# Patient Record
Sex: FEMALE | Race: BLACK OR AFRICAN AMERICAN | NOT HISPANIC OR LATINO | Employment: OTHER | URBAN - METROPOLITAN AREA
[De-identification: names, ages, dates, MRNs, and addresses within clinical notes are randomized per-mention and may not be internally consistent; named-entity substitution may affect disease eponyms.]

---

## 2023-05-04 ENCOUNTER — APPOINTMENT (OUTPATIENT)
Dept: PRIMARY CARE | Facility: CLINIC | Age: 78
End: 2023-05-04
Payer: MEDICARE

## 2023-06-18 DIAGNOSIS — E78.2 MIXED HYPERLIPIDEMIA: ICD-10-CM

## 2023-06-23 DIAGNOSIS — E78.5 HYPERLIPIDEMIA, UNSPECIFIED HYPERLIPIDEMIA TYPE: ICD-10-CM

## 2023-06-23 DIAGNOSIS — E78.5 HYPERLIPIDEMIA, UNSPECIFIED HYPERLIPIDEMIA TYPE: Primary | ICD-10-CM

## 2023-06-23 RX ORDER — ATORVASTATIN CALCIUM 10 MG/1
10 TABLET, FILM COATED ORAL DAILY
Qty: 90 TABLET | Refills: 3 | Status: SHIPPED | OUTPATIENT
Start: 2023-06-23 | End: 2023-06-23 | Stop reason: SDUPTHER

## 2023-06-23 RX ORDER — ATORVASTATIN CALCIUM 10 MG/1
10 TABLET, FILM COATED ORAL DAILY
Qty: 90 TABLET | Refills: 3 | Status: SHIPPED | OUTPATIENT
Start: 2023-06-23 | End: 2024-05-20 | Stop reason: SDUPTHER

## 2023-06-23 RX ORDER — ATORVASTATIN CALCIUM 10 MG/1
1 TABLET, FILM COATED ORAL DAILY
COMMUNITY
Start: 2019-08-16 | End: 2023-06-23 | Stop reason: SDUPTHER

## 2023-07-01 RX ORDER — ATORVASTATIN CALCIUM 10 MG/1
TABLET, FILM COATED ORAL
Qty: 90 TABLET | Refills: 3 | Status: SHIPPED | OUTPATIENT
Start: 2023-07-01 | End: 2024-06-30

## 2023-09-20 PROBLEM — H81.10 VERTIGO, BENIGN POSITIONAL: Status: ACTIVE | Noted: 2023-09-20

## 2023-09-20 PROBLEM — M76.61 ACHILLES TENDINITIS OF RIGHT LOWER EXTREMITY: Status: ACTIVE | Noted: 2023-09-20

## 2023-09-20 PROBLEM — N76.1 CHRONIC VAGINITIS: Status: ACTIVE | Noted: 2023-09-20

## 2023-09-20 PROBLEM — S83.241A TEAR OF MEDIAL MENISCUS OF RIGHT KNEE: Status: ACTIVE | Noted: 2023-09-20

## 2023-09-20 PROBLEM — M19.90 ARTHRITIS: Status: ACTIVE | Noted: 2023-09-20

## 2023-09-20 PROBLEM — L30.9 DERMATITIS: Status: ACTIVE | Noted: 2023-09-20

## 2023-09-20 PROBLEM — G47.00 INSOMNIA: Status: ACTIVE | Noted: 2023-09-20

## 2023-09-20 PROBLEM — E83.41 HIGH MAGNESIUM LEVELS: Status: ACTIVE | Noted: 2023-09-20

## 2023-09-20 PROBLEM — L30.9 ECZEMA: Status: ACTIVE | Noted: 2023-09-20

## 2023-09-20 PROBLEM — E78.2 MIXED HYPERLIPIDEMIA: Status: ACTIVE | Noted: 2023-09-20

## 2023-09-20 PROBLEM — M19.019 SHOULDER ARTHRITIS: Status: ACTIVE | Noted: 2023-09-20

## 2023-09-20 PROBLEM — M81.0 OSTEOPOROSIS: Status: ACTIVE | Noted: 2023-09-20

## 2023-09-20 PROBLEM — K21.9 GERD WITHOUT ESOPHAGITIS: Status: ACTIVE | Noted: 2023-09-20

## 2023-09-20 PROBLEM — H60.8X1 CHRONIC ECZEMATOUS OTITIS EXTERNA OF RIGHT EAR: Status: ACTIVE | Noted: 2023-09-20

## 2023-09-20 RX ORDER — IBANDRONATE SODIUM 3 MG/3 ML
SYRINGE (ML) INTRAVENOUS
COMMUNITY
Start: 2020-02-06 | End: 2024-05-20 | Stop reason: ALTCHOICE

## 2023-09-20 RX ORDER — IBANDRONATE SODIUM 3 MG/3ML
INJECTION, SOLUTION INTRAVENOUS
COMMUNITY
Start: 2020-02-06 | End: 2024-05-20 | Stop reason: ALTCHOICE

## 2023-09-20 RX ORDER — IBUPROFEN 800 MG/1
1 TABLET ORAL 3 TIMES DAILY
COMMUNITY
Start: 2022-05-02 | End: 2024-05-21 | Stop reason: SDUPTHER

## 2023-09-20 RX ORDER — NAPROXEN SODIUM 275 MG/1
1 TABLET ORAL EVERY 12 HOURS PRN
COMMUNITY
Start: 2021-12-09 | End: 2024-02-23 | Stop reason: ALTCHOICE

## 2023-09-20 RX ORDER — PANTOPRAZOLE SODIUM 40 MG/1
1 TABLET, DELAYED RELEASE ORAL DAILY
COMMUNITY
Start: 2018-08-07 | End: 2024-02-23 | Stop reason: ALTCHOICE

## 2023-09-20 RX ORDER — DEXTROMETHORPHAN HYDROBROMIDE, GUAIFENESIN 5; 100 MG/5ML; MG/5ML
LIQUID ORAL EVERY 8 HOURS PRN
COMMUNITY

## 2023-09-20 RX ORDER — ASPIRIN 81 MG/1
TABLET ORAL
COMMUNITY
End: 2024-02-23 | Stop reason: ALTCHOICE

## 2023-09-20 RX ORDER — ZOSTER VACCINE RECOMBINANT, ADJUVANTED 50 MCG/0.5
KIT INTRAMUSCULAR
COMMUNITY
Start: 2018-06-21 | End: 2024-05-20 | Stop reason: ALTCHOICE

## 2023-09-20 RX ORDER — TRAZODONE HYDROCHLORIDE 50 MG/1
1 TABLET ORAL NIGHTLY
COMMUNITY
Start: 2021-10-25 | End: 2024-02-23 | Stop reason: ALTCHOICE

## 2023-09-20 RX ORDER — FLUOCINONIDE 1 MG/G
1 CREAM TOPICAL 2 TIMES DAILY
COMMUNITY
Start: 2021-04-09 | End: 2024-02-23 | Stop reason: ALTCHOICE

## 2023-09-20 RX ORDER — MINERAL OIL
180 ENEMA (ML) RECTAL DAILY PRN
COMMUNITY

## 2023-09-20 RX ORDER — CLOTRIMAZOLE AND BETAMETHASONE DIPROPIONATE 10; .64 MG/G; MG/G
1 CREAM TOPICAL 2 TIMES DAILY
COMMUNITY
Start: 2018-06-21 | End: 2024-02-23 | Stop reason: ALTCHOICE

## 2023-09-20 RX ORDER — CLOBETASOL PROPIONATE 0.5 MG/G
CREAM TOPICAL 2 TIMES DAILY
COMMUNITY
Start: 2020-07-02 | End: 2024-02-23 | Stop reason: ALTCHOICE

## 2023-09-20 RX ORDER — CLINDAMYCIN HYDROCHLORIDE 300 MG/1
CAPSULE ORAL
COMMUNITY
End: 2024-02-23 | Stop reason: ALTCHOICE

## 2023-09-20 RX ORDER — HYDROCODONE BITARTRATE AND ACETAMINOPHEN 5; 325 MG/1; MG/1
TABLET ORAL
COMMUNITY
Start: 2017-05-19 | End: 2024-02-23 | Stop reason: ALTCHOICE

## 2023-09-20 RX ORDER — SULFAMETHOXAZOLE AND TRIMETHOPRIM 800; 160 MG/1; MG/1
TABLET ORAL
COMMUNITY
Start: 2017-05-19 | End: 2024-02-23 | Stop reason: ALTCHOICE

## 2023-09-20 RX ORDER — MULTIVITAMIN
1 TABLET ORAL DAILY
COMMUNITY
End: 2024-05-20 | Stop reason: WASHOUT

## 2023-09-20 RX ORDER — FLUOCINOLONE ACETONIDE 0.25 MG/G
CREAM TOPICAL
COMMUNITY
Start: 2021-04-09 | End: 2024-05-20 | Stop reason: WASHOUT

## 2023-09-20 RX ORDER — METHYLPREDNISOLONE ACETATE 40 MG/ML
INJECTION, SUSPENSION INTRA-ARTICULAR; INTRALESIONAL; INTRAMUSCULAR; SOFT TISSUE
COMMUNITY
Start: 2016-07-08 | End: 2024-02-23 | Stop reason: ALTCHOICE

## 2023-12-15 ENCOUNTER — OFFICE VISIT (OUTPATIENT)
Dept: ORTHOPEDIC SURGERY | Facility: CLINIC | Age: 78
End: 2023-12-15
Payer: MEDICARE

## 2023-12-15 DIAGNOSIS — M16.12 ARTHRITIS OF LEFT HIP: Primary | ICD-10-CM

## 2023-12-15 PROCEDURE — 20611 DRAIN/INJ JOINT/BURSA W/US: CPT | Performed by: EMERGENCY MEDICINE

## 2023-12-15 PROCEDURE — 99213 OFFICE O/P EST LOW 20 MIN: CPT | Performed by: EMERGENCY MEDICINE

## 2023-12-15 RX ORDER — LIDOCAINE HYDROCHLORIDE 10 MG/ML
4 INJECTION INFILTRATION; PERINEURAL
Status: COMPLETED | OUTPATIENT
Start: 2023-12-15 | End: 2023-12-15

## 2023-12-15 RX ORDER — TRIAMCINOLONE ACETONIDE 40 MG/ML
80 INJECTION, SUSPENSION INTRA-ARTICULAR; INTRAMUSCULAR
Status: COMPLETED | OUTPATIENT
Start: 2023-12-15 | End: 2023-12-15

## 2023-12-15 RX ADMIN — LIDOCAINE HYDROCHLORIDE 4 ML: 10 INJECTION INFILTRATION; PERINEURAL at 12:03

## 2023-12-15 RX ADMIN — TRIAMCINOLONE ACETONIDE 80 MG: 40 INJECTION, SUSPENSION INTRA-ARTICULAR; INTRAMUSCULAR at 12:03

## 2023-12-15 NOTE — PROGRESS NOTES
Subjective   Sharifa Moore is a 78 y.o. female who presents for Follow-up and Pain of the Left Hip (DOLI: 9/18/23/)    HPI  12/15/23: Patient returns today for left hip pain.  We did perform a left hip intra-articular injection for her on 9/18/2023.  She felt this worked quite well for her and would like to have a repeat injection performed today.  No additional complaints.    9/18/23: Patient returns today for left hip pain. We performed a left hip intra-articular injection on 6/30/2023. She felt this worked well up until recently. She would like to have a repeat hip intra-articular injection today. She denies any further injuries. She has no additional complaints at this time.     6/30/23: Patient returns today for left hip pain. She states the previous injection she received on 3/31/2023 worked quite well for her up until recently. She presents today requesting repeat injection. She denies further injuries. She has no additional complaints today.    3/31/23: Patient returns today for left hip pain. She states the previous injection she received on 12/19/2022 worked quite well for her. She presents today requesting repeat injection versus further treatment options. She denies any further injuries. She has no additional complaints at this time.    12/19/22: Patient returns today for reevaluation for left hip pain. She states the preinjection she received on 9/8/2022 worked quite well for her up until recently. She presents today requesting repeat injection. She denies any further injuries. She has no additional complaints at this time.    9/8/22: Patient returns today for reevaluation for left hip and right ankle pain. In regards to her hip pain, she feels that physical therapy has not helped significantly. She continues to have pain that she localizes deep in her groin with radiation to the lateral aspect of her hip. This pain is worse with hip flexion and has progressed to pain with weightbearing activities.  Regards to her ankle pain, she no longer localizes her pain along the Achilles tendon, however she does localize it to the deep and anterior aspect of her ankle. She states that this is associated with swelling that becomes progressively worse throughout weightbearing activities. She would like to discuss further treatment options.    5/9/22: Sharifa is a very pleasant 77-year-old female who presented today 5/9/2022 for evaluation of left hip pain. She is known to me from previous visits for right knee arthritis. Today, she states that she has had about 2.5 months of left hip pain that started after a session on a stationary bike. She states that she was riding for approximately 10 minutes before feeling anterior left hip pain. She stopped riding and felt a constant soreness/achy type sensation rated 6/10 intensity. She was seen by her primary care doctor who obtained a radiograph of the left hip and recommended Tylenol for pain control. She states that the pain is not responding to rest alone but does temporarily improve with Tylenol. Pain is worse with movement and exercise, better with Tylenol and rest. At the worst she rates the pain 9/10 intensity anterior left hip. She has never had issues with her hip prior to this. She denies any paresthesias or back pain. She denies any skin changes, erythema, redness, bruising or swelling. She denies any other complaints today. Subsequently, she is complaining of right posterior ankle pain. I did evaluate this in the past when she had generalized ankle pain and stiffness consistent with degenerative arthritis. However, she states that she has developed worsening pain on the posterior aspect that she localizes along the mid substance of the Achilles tendon. She states that this has become progressively worse of the past 2 months as well. She denies acute trauma.       ROS: All pertinent positive symptoms are included in the history of present illness.    All other systems  have been reviewed and are negative and noncontributory to this patient's current ailments.    Objective     There were no vitals filed for this visit.    Physical Exam  General/Constitutional: No apparent distress. Well-nourished and well developed.  Head: Normocephalic, Atraumatic.   Eyes: EOMI.  Vascular: No edema, swelling or tenderness, except as noted in detailed exam.  Respiratory: Non-labored breathing.  Integumentary: No impressive skin lesions present, except as noted in detailed exam.  Neurological: Oriented to person, place, and time.  Psychological: Normal mood and affect.  Musculoskeletal: Normal, except as noted in detailed exam   The left hip and pelvis are without obvious signs of acute bony deformity, swelling, erythema, ecchymosis or instability. There is tenderness to palpation over the rectus femoris and psoas over the left anterior hip region. Active straight leg raise versus gravity reproduces her anterior hip pain, although she is able to hold the leg in extension with assistance. Seated hip flexion versus resistance also produces pain. Active and passive range of motion are full and pain-free. Logroll is negative.. Straight leg raise test is negative for pain or radicular symptoms. Thaddeus is negative. Crossover is negative. Hip strength is weak as compared to the opposite hip. The opposite hip is otherwise nontender and stable. Gait is antalgic and tandem.       Patient ID: Sharifa Moore is a 78 y.o. female.    L Inj/Asp: L hip joint on 12/15/2023 12:03 PM  Indications: pain  Details: 20 G needle, ultrasound-guided anterior approach  Medications: 80 mg triamcinolone acetonide 40 mg/mL; 4 mL lidocaine 10 mg/mL (1 %)  Procedure, treatment alternatives, risks and benefits explained, specific risks discussed. Consent was given by the patient. Immediately prior to procedure a time out was called to verify the correct patient, procedure, equipment, support staff and site/side marked as  required. Patient was prepped and draped in the usual sterile fashion.           Assessment/Plan   Problem List Items Addressed This Visit    None  Visit Diagnoses       Arthritis of left hip        Relevant Orders    Point of Care Ultrasound (Completed)          Considering that she has done well with previous injections, I feel that a repeat injection is warranted today. Discussed risks versus benefits of having injection performed. Patient has elected to proceed with procedure. Please refer to procedure note above. Discussed with patient to limit weightbearing activities over the next 24-48 hours, they can then progress to full activities as tolerated. Discussed with patient to avoid water submersion over the next 2 days. Discussed with patient to call me immediately if they develop worsening pain, rash, erythema, or fevers. Patient should follow-up as needed if pain persists or worsens.    Param Guzman, DO  Sports Medicine  [facility]     ** Please excuse any errors in grammar or translation related to this dictation. Voice recognition software was utilized to prepare this document. **

## 2023-12-19 ENCOUNTER — APPOINTMENT (OUTPATIENT)
Dept: ORTHOPEDIC SURGERY | Facility: HOSPITAL | Age: 78
End: 2023-12-19
Payer: MEDICARE

## 2023-12-22 ENCOUNTER — OFFICE VISIT (OUTPATIENT)
Dept: ORTHOPEDIC SURGERY | Facility: CLINIC | Age: 78
End: 2023-12-22
Payer: MEDICARE

## 2023-12-22 DIAGNOSIS — M17.11 ARTHRITIS OF RIGHT KNEE: Primary | ICD-10-CM

## 2023-12-22 PROCEDURE — 99213 OFFICE O/P EST LOW 20 MIN: CPT | Performed by: EMERGENCY MEDICINE

## 2023-12-22 PROCEDURE — 20611 DRAIN/INJ JOINT/BURSA W/US: CPT | Performed by: EMERGENCY MEDICINE

## 2023-12-22 RX ORDER — TRIAMCINOLONE ACETONIDE 40 MG/ML
80 INJECTION, SUSPENSION INTRA-ARTICULAR; INTRAMUSCULAR
Status: COMPLETED | OUTPATIENT
Start: 2023-12-22 | End: 2023-12-22

## 2023-12-22 RX ORDER — LIDOCAINE HYDROCHLORIDE 10 MG/ML
4 INJECTION INFILTRATION; PERINEURAL
Status: COMPLETED | OUTPATIENT
Start: 2023-12-22 | End: 2023-12-22

## 2023-12-22 RX ADMIN — LIDOCAINE HYDROCHLORIDE 4 ML: 10 INJECTION INFILTRATION; PERINEURAL at 10:31

## 2023-12-22 RX ADMIN — TRIAMCINOLONE ACETONIDE 80 MG: 40 INJECTION, SUSPENSION INTRA-ARTICULAR; INTRAMUSCULAR at 10:31

## 2023-12-22 ASSESSMENT — ENCOUNTER SYMPTOMS: KNEE SWELLING: 1

## 2023-12-22 NOTE — PROGRESS NOTES
Subjective   Sharifa Moore is a 78 y.o. female who presents for Follow-up of the Right Knee    Right Knee       12/22/23: Patient returns today for right knee pain.  We performed a right knee corticosteroid injection on 7/28/2023.  She felt that this worked quite well for her and would like to have a repeat injection performed today.  No additional complaints.    7/28/23: Patient returns today for right knee pain. She states the previous injection she received on 5/5/2023 worked quite well up until recently. She presents today requesting a repeat injection. She denies any further injuries.    5/5/23: Patient returns today for right knee pain. She states the previous injection she received on 1/13/2023 worked quite well for her up until recently. She would like to have a repeat injection today. She denies any further injuries. She has no additional complaints at this time.    1/13/23: Patient returns today for reevaluation for right knee pain. She states that the previous injection she received on 3/21/2022 did provide significant relief up until recently. She presents today requesting repeat injection. She denies any recent injuries.    3/21/22: Sharifa is a very pleasant 77-year-old female here today for evaluation of chronic right knee pain. She states that approximately 6 months ago her knee pain insidiously began to worsen without any acute injury or trauma. Referral by Dr Fernando. She does have a history of partial tear of her right Achilles 4 years ago however, she has no other history of right lower extremity pain. She has no surgical history for this knee. She describes an achy pain about her right anterior and lateral aspect of her knee that is worse with standing for too long, kneeling, bending, stairs, standing from a seated position and she does feel unstable at times. Pain rated as moderate in intensity, not associated with any swelling or erythema. She has tried naproxen 1 tab about 2-3 times a  week, Tylenol arthritis and Vinny green herbal cream for pain relief. She denies any recent injury. She would like to discuss further treatment options today. No other complaints today.       ROS: All pertinent positive symptoms are included in the history of present illness.    All other systems have been reviewed and are negative and noncontributory to this patient's current ailments.    Objective     There were no vitals filed for this visit.    Physical Exam  ysical Exam  General/Constitutional: No apparent distress. Well-nourished and well developed.  Head: Normocephalic, Atraumatic.   Eyes: EOMI.  Vascular: No edema, swelling or tenderness, except as noted in detailed exam.  Respiratory: Non-labored breathing.  Integumentary: No impressive skin lesions present, except as noted in detailed exam.  Neurological: Oriented to person, place, and time.  Psychological: Normal mood and affect.  Musculoskeletal: Normal, except as noted in detailed exam     The right knee is without obvious signs of acute bony deformity, swelling, erythema, ecchymosis or joint effusion. The patella is without tenderness. Apprehension is negative with medial and lateral glide. Patella crepitus is negative. Patella grind is positive. The medial joint line is nontender and without bony crepitus or step-off. The lateral joint line is TTP and without bony crepitus or step-off. Flexion & extension are full and symmetrical. Varus & valgus stress test at 0° and 30° of flexion, Lachman's, Bj's, Apley's, dial test at 90° and 30° of flexion, and posterior drawer are all negative. The opposite knee is nontender and stable. Gait is pain-free and tandem.    Patient ID: Sharifa Moore is a 78 y.o. female.    L Inj/Asp: R knee on 12/22/2023 10:31 AM  Indications: pain  Details: 25 G needle, ultrasound-guided superolateral approach  Medications: 80 mg triamcinolone acetonide 40 mg/mL; 4 mL lidocaine 10 mg/mL (1 %)  Outcome: tolerated well, no  immediate complications  Procedure, treatment alternatives, risks and benefits explained, specific risks discussed. Consent was given by the patient. Immediately prior to procedure a time out was called to verify the correct patient, procedure, equipment, support staff and site/side marked as required. Patient was prepped and draped in the usual sterile fashion.             Assessment/Plan   Problem List Items Addressed This Visit    None  Visit Diagnoses       Arthritis of right knee        Relevant Orders    Point of Care Ultrasound (Completed)          Considering that she did well with previous injections, I feel that a repeat injection today is warranted. Discussed risks versus benefits of having injection performed. Patient has elected to proceed with procedure. Please refer to procedure note above. Discussed with patient to limit weightbearing activities over the next 24-48 hours, they can then progress to full activities as tolerated. Discussed with patient to avoid water submersion over the next 2 days. Discussed with patient to call me immediately if they develop worsening pain, rash, erythema, or fevers. Patient should follow-up as needed if pain persists or worsens.    Param Guzman,   Sports Medicine  [facility]     ** Please excuse any errors in grammar or translation related to this dictation. Voice recognition software was utilized to prepare this document. **

## 2024-02-23 ENCOUNTER — OFFICE VISIT (OUTPATIENT)
Dept: PRIMARY CARE | Facility: CLINIC | Age: 79
End: 2024-02-23
Payer: MEDICARE

## 2024-02-23 VITALS
WEIGHT: 169 LBS | BODY MASS INDEX: 30.94 KG/M2 | HEART RATE: 50 BPM | DIASTOLIC BLOOD PRESSURE: 69 MMHG | SYSTOLIC BLOOD PRESSURE: 153 MMHG | OXYGEN SATURATION: 98 % | TEMPERATURE: 96.8 F

## 2024-02-23 DIAGNOSIS — Z12.31 SCREENING MAMMOGRAM FOR BREAST CANCER: ICD-10-CM

## 2024-02-23 DIAGNOSIS — F51.02 ADJUSTMENT INSOMNIA: ICD-10-CM

## 2024-02-23 DIAGNOSIS — Z00.00 ROUTINE GENERAL MEDICAL EXAMINATION AT HEALTH CARE FACILITY: ICD-10-CM

## 2024-02-23 DIAGNOSIS — E78.2 MIXED HYPERLIPIDEMIA: ICD-10-CM

## 2024-02-23 DIAGNOSIS — I10 HYPERTENSION, UNSPECIFIED TYPE: ICD-10-CM

## 2024-02-23 DIAGNOSIS — R53.83 FATIGUE, UNSPECIFIED TYPE: ICD-10-CM

## 2024-02-23 DIAGNOSIS — D64.9 ANEMIA, UNSPECIFIED TYPE: Primary | ICD-10-CM

## 2024-02-23 PROCEDURE — 80053 COMPREHEN METABOLIC PANEL: CPT | Performed by: INTERNAL MEDICINE

## 2024-02-23 PROCEDURE — 1159F MED LIST DOCD IN RCRD: CPT | Performed by: INTERNAL MEDICINE

## 2024-02-23 PROCEDURE — 3077F SYST BP >= 140 MM HG: CPT | Performed by: INTERNAL MEDICINE

## 2024-02-23 PROCEDURE — 1160F RVW MEDS BY RX/DR IN RCRD: CPT | Performed by: INTERNAL MEDICINE

## 2024-02-23 PROCEDURE — G0439 PPPS, SUBSEQ VISIT: HCPCS | Performed by: INTERNAL MEDICINE

## 2024-02-23 PROCEDURE — 1170F FXNL STATUS ASSESSED: CPT | Performed by: INTERNAL MEDICINE

## 2024-02-23 PROCEDURE — 1036F TOBACCO NON-USER: CPT | Performed by: INTERNAL MEDICINE

## 2024-02-23 PROCEDURE — 84443 ASSAY THYROID STIM HORMONE: CPT | Performed by: INTERNAL MEDICINE

## 2024-02-23 PROCEDURE — 85025 COMPLETE CBC W/AUTO DIFF WBC: CPT | Performed by: INTERNAL MEDICINE

## 2024-02-23 PROCEDURE — 3078F DIAST BP <80 MM HG: CPT | Performed by: INTERNAL MEDICINE

## 2024-02-23 PROCEDURE — 99214 OFFICE O/P EST MOD 30 MIN: CPT | Performed by: INTERNAL MEDICINE

## 2024-02-23 PROCEDURE — 80061 LIPID PANEL: CPT | Performed by: INTERNAL MEDICINE

## 2024-02-23 RX ORDER — TRAZODONE HYDROCHLORIDE 50 MG/1
50 TABLET ORAL NIGHTLY PRN
Qty: 90 TABLET | Refills: 3 | Status: SHIPPED | OUTPATIENT
Start: 2024-02-23 | End: 2024-06-03 | Stop reason: HOSPADM

## 2024-02-23 RX ORDER — TRAZODONE HYDROCHLORIDE 50 MG/1
50 TABLET ORAL NIGHTLY
COMMUNITY
End: 2024-05-20 | Stop reason: SDUPTHER

## 2024-02-23 ASSESSMENT — ENCOUNTER SYMPTOMS
LOSS OF SENSATION IN FEET: 0
DEPRESSION: 0
OCCASIONAL FEELINGS OF UNSTEADINESS: 0

## 2024-02-23 ASSESSMENT — ACTIVITIES OF DAILY LIVING (ADL)
GROOMING: INDEPENDENT
DOING HOUSEWORK: INDEPENDENT
NEEDS ASSISTANCE WITH FOOD: INDEPENDENT
ASSISTIVE_DEVICE: HEARING AID - LEFT;HEARING AID - RIGHT
PATIENT'S MEMORY ADEQUATE TO SAFELY COMPLETE DAILY ACTIVITIES?: YES
TOILETING: INDEPENDENT
USING TRANSPORTATION: INDEPENDENT
USING TELEPHONE: INDEPENDENT
MANAGING FINANCES: INDEPENDENT
FEEDING YOURSELF: INDEPENDENT
HEARING - LEFT EAR: HEARING AID
PILL BOX USED: NO
PREPARING MEALS: INDEPENDENT
GROCERY SHOPPING: INDEPENDENT
EATING: INDEPENDENT
JUDGMENT_ADEQUATE_SAFELY_COMPLETE_DAILY_ACTIVITIES: YES
TAKING MEDICATION: INDEPENDENT
BATHING: INDEPENDENT
WALKS IN HOME: INDEPENDENT
DRESSING YOURSELF: INDEPENDENT
STIL DRIVING: YES
ADEQUATE_TO_COMPLETE_ADL: YES
HEARING - RIGHT EAR: HEARING AID

## 2024-02-23 ASSESSMENT — COGNITIVE AND FUNCTIONAL STATUS - GENERAL
VERBAL FLUENCY - ANIMAL NAMES (0 TO 25): 25
TRAIL MAKING TEST: PATIENT COMPLETES TRAIL MAKING TEST PROPERLY.

## 2024-02-23 ASSESSMENT — GERIATRIC MINI NUTRITIONAL ASSESSMENT (MNA)
A HAS FOOD INTAKE DECLINED OVER THE PAST 3 MONTHS DUE TO LOSS OF APPETITE, DIGESTIVE PROBLEMS, CHEWING OR SWALLOWING DIFFICULTIES?: NO DECREASE IN FOOD INTAKE
B WEIGHT LOSS DURING THE LAST 3 MONTHS: NO WEIGHT LOSS
D HAS SUFFERED PSYCHOLOGICAL STRESS OR ACUTE DISEASE IN THE PAST 3 MONTHS?: NO
C GENERAL MOBILITY: GOES OUT
E NEUROPSYCHOLOGICAL PROBLEMS: NO PSYCHOLOGICAL PROBLEMS

## 2024-02-23 ASSESSMENT — COLUMBIA-SUICIDE SEVERITY RATING SCALE - C-SSRS
2. HAVE YOU ACTUALLY HAD ANY THOUGHTS OF KILLING YOURSELF?: NO
6. HAVE YOU EVER DONE ANYTHING, STARTED TO DO ANYTHING, OR PREPARED TO DO ANYTHING TO END YOUR LIFE?: NO
1. IN THE PAST MONTH, HAVE YOU WISHED YOU WERE DEAD OR WISHED YOU COULD GO TO SLEEP AND NOT WAKE UP?: NO

## 2024-02-23 ASSESSMENT — PATIENT HEALTH QUESTIONNAIRE - PHQ9
1. LITTLE INTEREST OR PLEASURE IN DOING THINGS: NOT AT ALL
SUM OF ALL RESPONSES TO PHQ9 QUESTIONS 1 AND 2: 0
2. FEELING DOWN, DEPRESSED OR HOPELESS: NOT AT ALL

## 2024-02-23 NOTE — PROGRESS NOTES
Subjective   Reason for Visit: Sharifa Moore is an 78 y.o. female here for a Medicare Wellness visit.     Past Medical, Surgical, and Family History reviewed and updated in chart.    Reviewed all medications by prescribing practitioner or clinical pharmacist (such as prescriptions, OTCs, herbal therapies and supplements) and documented in the medical record.    HPI  78 years old female comes in to see me today for her subsequent Medicare wellness exam and follow-up visit.  We treated her for hyperlipidemia seasonal allergies and insomnia, she had bad osteoarthritis of the left hip and her right knee from favoring it.  Receives injection in her left hip every 3 months by Dr. Guzman.  Also injecting her right  Lives with daughter  . Lost her  he was 76 years old.  Sepsis in 2020.  He had a bad wound and he was on dialysis.  Medications are atorvastatin 10 mg a day Allegra 180 mg a day multivitamins and trazodone 50 mg a day for sleep or insomnia.  Advised her to take vitamin D 1000 mg a day [place] with calcium.  She will need mammogram  Patient Care Team:  Dov Jo MD as PCP - General (Internal Medicine)     Review of Systems  Alert oriented pleasant cooperative in no distress.  Nonicteric sclera or jaundice.  Face symmetrical cranial nerves intact.  Neck supple no masses no lymph node no thyromegaly or jugular venous distention.  Lungs clear no rales wheezing or crackles.  Heart normal S1 and S2 regular rhythm.  Abdomen benign nontender no masses no organomegaly.  Neurologically intact.  Objective   Vitals:  /69 (BP Location: Right arm, Patient Position: Sitting, BP Cuff Size: Adult)   Pulse 50   Temp 36 °C (96.8 °F) (Temporal)   Wt 76.7 kg (169 lb)   SpO2 98%   BMI 30.94 kg/m²       Physical Exam  Dictated above  Assessment/Plan   Problem List Items Addressed This Visit       Mixed hyperlipidemia    Relevant Orders    Lipid Panel     Other Visit Diagnoses       Anemia, unspecified type     -  Primary    Relevant Orders    CBC    Hypertension, unspecified type        Relevant Orders    Comprehensive Metabolic Panel    Fatigue, unspecified type        Relevant Orders    Thyroid Stimulating Hormone    Screening mammogram for breast cancer        Relevant Orders    BI mammo bilateral screening tomosynthesis    Routine general medical examination at health care facility

## 2024-02-26 ENCOUNTER — TELEPHONE (OUTPATIENT)
Dept: PRIMARY CARE | Facility: CLINIC | Age: 79
End: 2024-02-26
Payer: MEDICARE

## 2024-02-26 NOTE — TELEPHONE ENCOUNTER
----- Message from Dov Jo MD sent at 2/23/2024  5:39 PM EST -----  Regarding: r  Results are normal  Watch diet ,low carbs low fat

## 2024-03-08 ENCOUNTER — OFFICE VISIT (OUTPATIENT)
Dept: ORTHOPEDIC SURGERY | Facility: CLINIC | Age: 79
End: 2024-03-08
Payer: MEDICARE

## 2024-03-08 DIAGNOSIS — M16.12 ARTHRITIS OF LEFT HIP: Primary | ICD-10-CM

## 2024-03-08 PROCEDURE — 1160F RVW MEDS BY RX/DR IN RCRD: CPT | Performed by: EMERGENCY MEDICINE

## 2024-03-08 PROCEDURE — 1036F TOBACCO NON-USER: CPT | Performed by: EMERGENCY MEDICINE

## 2024-03-08 PROCEDURE — 1159F MED LIST DOCD IN RCRD: CPT | Performed by: EMERGENCY MEDICINE

## 2024-03-08 PROCEDURE — 99213 OFFICE O/P EST LOW 20 MIN: CPT | Performed by: EMERGENCY MEDICINE

## 2024-03-08 PROCEDURE — 20611 DRAIN/INJ JOINT/BURSA W/US: CPT | Performed by: EMERGENCY MEDICINE

## 2024-03-08 RX ORDER — TRIAMCINOLONE ACETONIDE 40 MG/ML
80 INJECTION, SUSPENSION INTRA-ARTICULAR; INTRAMUSCULAR
Status: COMPLETED | OUTPATIENT
Start: 2024-03-08 | End: 2024-03-08

## 2024-03-08 RX ORDER — LIDOCAINE HYDROCHLORIDE 10 MG/ML
4 INJECTION INFILTRATION; PERINEURAL
Status: COMPLETED | OUTPATIENT
Start: 2024-03-08 | End: 2024-03-08

## 2024-03-08 RX ADMIN — TRIAMCINOLONE ACETONIDE 80 MG: 40 INJECTION, SUSPENSION INTRA-ARTICULAR; INTRAMUSCULAR at 12:23

## 2024-03-08 RX ADMIN — LIDOCAINE HYDROCHLORIDE 4 ML: 10 INJECTION INFILTRATION; PERINEURAL at 12:23

## 2024-03-08 NOTE — PROGRESS NOTES
Subjective   Sharifa Moore is a 79 y.o. female who presents for Follow-up and Pain of the Left Hip (DOLI: 12/15/23/)    HPI    3/8/24: Patient returns today for left hip pain.  We did perform a left hip intra-articular injection for her on 12/15/2023.  She felt that it worked quite well up until 3 weeks ago.  She like to have a repeat injection performed today.  However additionally, she would like to discuss the option of total joint replacement.  She feels that these injections are beginning to wean in response and would like to discuss the neck step.    12/15/23: Patient returns today for left hip pain.  We did perform a left hip intra-articular injection for her on 9/18/2023.  She felt this worked quite well for her and would like to have a repeat injection performed today.  No additional complaints.    9/18/23: Patient returns today for left hip pain. We performed a left hip intra-articular injection on 6/30/2023. She felt this worked well up until recently. She would like to have a repeat hip intra-articular injection today. She denies any further injuries. She has no additional complaints at this time.     6/30/23: Patient returns today for left hip pain. She states the previous injection she received on 3/31/2023 worked quite well for her up until recently. She presents today requesting repeat injection. She denies further injuries. She has no additional complaints today.    3/31/23: Patient returns today for left hip pain. She states the previous injection she received on 12/19/2022 worked quite well for her. She presents today requesting repeat injection versus further treatment options. She denies any further injuries. She has no additional complaints at this time.    12/19/22: Patient returns today for reevaluation for left hip pain. She states the preinjection she received on 9/8/2022 worked quite well for her up until recently. She presents today requesting repeat injection. She denies any further  injuries. She has no additional complaints at this time.    9/8/22: Patient returns today for reevaluation for left hip and right ankle pain. In regards to her hip pain, she feels that physical therapy has not helped significantly. She continues to have pain that she localizes deep in her groin with radiation to the lateral aspect of her hip. This pain is worse with hip flexion and has progressed to pain with weightbearing activities. Regards to her ankle pain, she no longer localizes her pain along the Achilles tendon, however she does localize it to the deep and anterior aspect of her ankle. She states that this is associated with swelling that becomes progressively worse throughout weightbearing activities. She would like to discuss further treatment options.    5/9/22: Sharifa is a very pleasant 77-year-old female who presented today 5/9/2022 for evaluation of left hip pain. She is known to me from previous visits for right knee arthritis. Today, she states that she has had about 2.5 months of left hip pain that started after a session on a stationary bike. She states that she was riding for approximately 10 minutes before feeling anterior left hip pain. She stopped riding and felt a constant soreness/achy type sensation rated 6/10 intensity. She was seen by her primary care doctor who obtained a radiograph of the left hip and recommended Tylenol for pain control. She states that the pain is not responding to rest alone but does temporarily improve with Tylenol. Pain is worse with movement and exercise, better with Tylenol and rest. At the worst she rates the pain 9/10 intensity anterior left hip. She has never had issues with her hip prior to this. She denies any paresthesias or back pain. She denies any skin changes, erythema, redness, bruising or swelling. She denies any other complaints today. Subsequently, she is complaining of right posterior ankle pain. I did evaluate this in the past when she had  generalized ankle pain and stiffness consistent with degenerative arthritis. However, she states that she has developed worsening pain on the posterior aspect that she localizes along the mid substance of the Achilles tendon. She states that this has become progressively worse of the past 2 months as well. She denies acute trauma.       ROS: All pertinent positive symptoms are included in the history of present illness.    All other systems have been reviewed and are negative and noncontributory to this patient's current ailments.    Objective     There were no vitals filed for this visit.    Physical Exam  General/Constitutional: No apparent distress. Well-nourished and well developed.  Head: Normocephalic, Atraumatic.   Eyes: EOMI.  Vascular: No edema, swelling or tenderness, except as noted in detailed exam.  Respiratory: Non-labored breathing.  Integumentary: No impressive skin lesions present, except as noted in detailed exam.  Neurological: Oriented to person, place, and time.  Psychological: Normal mood and affect.  Musculoskeletal: Normal, except as noted in detailed exam   The left hip and pelvis are without obvious signs of acute bony deformity, swelling, erythema, ecchymosis or instability. There is tenderness to palpation over the rectus femoris and psoas over the left anterior hip region. Active straight leg raise versus gravity reproduces her anterior hip pain, although she is able to hold the leg in extension with assistance. Seated hip flexion versus resistance also produces pain. Active and passive range of motion are full and pain-free. Logroll is negative.. Straight leg raise test is negative for pain or radicular symptoms. Thaddeus is negative. Crossover is negative. Hip strength is weak as compared to the opposite hip. The opposite hip is otherwise nontender and stable. Gait is antalgic and tandem.       Patient ID: Sharifa Moore is a 79 y.o. female.    L Inj/Asp: L hip joint on 3/8/2024 12:23  PM  Indications: pain  Details: 20 G needle, ultrasound-guided anterior approach  Medications: 80 mg triamcinolone acetonide 40 mg/mL; 4 mL lidocaine 10 mg/mL (1 %)  Outcome: tolerated well, no immediate complications  Procedure, treatment alternatives, risks and benefits explained, specific risks discussed. Consent was given by the patient. Immediately prior to procedure a time out was called to verify the correct patient, procedure, equipment, support staff and site/side marked as required. Patient was prepped and draped in the usual sterile fashion.           Assessment/Plan   Problem List Items Addressed This Visit    None  Visit Diagnoses       Arthritis of left hip        Relevant Orders    Point of Care Ultrasound (Completed)          Considering that she has done well with previous injections, I feel that a repeat injection is warranted today. Discussed risks versus benefits of having injection performed. Patient has elected to proceed with procedure. Please refer to procedure note above. Discussed with patient to limit weightbearing activities over the next 24-48 hours, they can then progress to full activities as tolerated. Discussed with patient to avoid water submersion over the next 2 days. Discussed with patient to call me immediately if they develop worsening pain, rash, erythema, or fevers. Patient should follow-up as needed if pain persists or worsens.  Additionally, I am giving her referral for total joint replacement to discuss the option of total hip replacement.  She is aware that she cannot have a replacement within 3 months of an injection.  I am more than happy to continue injection therapy for her until she proceeds with total hip replacement.    Param Guzman, DO  Sports Medicine  [facility]     ** Please excuse any errors in grammar or translation related to this dictation. Voice recognition software was utilized to prepare this document. **

## 2024-03-13 ENCOUNTER — HOSPITAL ENCOUNTER (OUTPATIENT)
Dept: RADIOLOGY | Facility: CLINIC | Age: 79
Discharge: HOME | End: 2024-03-13
Payer: MEDICARE

## 2024-03-13 VITALS — WEIGHT: 166 LBS | BODY MASS INDEX: 30.55 KG/M2 | HEIGHT: 62 IN

## 2024-03-13 DIAGNOSIS — Z12.31 SCREENING MAMMOGRAM FOR BREAST CANCER: ICD-10-CM

## 2024-03-13 PROCEDURE — 77067 SCR MAMMO BI INCL CAD: CPT | Performed by: RADIOLOGY

## 2024-03-13 PROCEDURE — 77067 SCR MAMMO BI INCL CAD: CPT

## 2024-03-13 PROCEDURE — 77063 BREAST TOMOSYNTHESIS BI: CPT | Performed by: RADIOLOGY

## 2024-03-18 ENCOUNTER — TELEPHONE (OUTPATIENT)
Dept: PRIMARY CARE | Facility: CLINIC | Age: 79
End: 2024-03-18
Payer: MEDICARE

## 2024-03-18 NOTE — TELEPHONE ENCOUNTER
----- Message from Dov Jo MD sent at 3/16/2024  1:45 PM EDT -----  Regarding: R  Normal mammogram

## 2024-03-22 ENCOUNTER — OFFICE VISIT (OUTPATIENT)
Dept: ORTHOPEDIC SURGERY | Facility: CLINIC | Age: 79
End: 2024-03-22
Payer: MEDICARE

## 2024-03-22 DIAGNOSIS — M17.11 ARTHRITIS OF RIGHT KNEE: Primary | ICD-10-CM

## 2024-03-22 PROCEDURE — 1036F TOBACCO NON-USER: CPT | Performed by: EMERGENCY MEDICINE

## 2024-03-22 PROCEDURE — 20611 DRAIN/INJ JOINT/BURSA W/US: CPT | Performed by: EMERGENCY MEDICINE

## 2024-03-22 PROCEDURE — 99214 OFFICE O/P EST MOD 30 MIN: CPT | Performed by: EMERGENCY MEDICINE

## 2024-03-22 PROCEDURE — 1160F RVW MEDS BY RX/DR IN RCRD: CPT | Performed by: EMERGENCY MEDICINE

## 2024-03-22 PROCEDURE — 1159F MED LIST DOCD IN RCRD: CPT | Performed by: EMERGENCY MEDICINE

## 2024-03-22 RX ORDER — TRIAMCINOLONE ACETONIDE 40 MG/ML
80 INJECTION, SUSPENSION INTRA-ARTICULAR; INTRAMUSCULAR
Status: COMPLETED | OUTPATIENT
Start: 2024-03-22 | End: 2024-03-22

## 2024-03-22 RX ORDER — LIDOCAINE HYDROCHLORIDE 10 MG/ML
4 INJECTION INFILTRATION; PERINEURAL
Status: COMPLETED | OUTPATIENT
Start: 2024-03-22 | End: 2024-03-22

## 2024-03-22 RX ADMIN — TRIAMCINOLONE ACETONIDE 80 MG: 40 INJECTION, SUSPENSION INTRA-ARTICULAR; INTRAMUSCULAR at 11:08

## 2024-03-22 RX ADMIN — LIDOCAINE HYDROCHLORIDE 4 ML: 10 INJECTION INFILTRATION; PERINEURAL at 11:08

## 2024-03-22 ASSESSMENT — ENCOUNTER SYMPTOMS: KNEE SWELLING: 1

## 2024-03-22 NOTE — PROGRESS NOTES
Subjective   Sharifa Moore is a 79 y.o. female who presents for Follow-up of the Right Knee    Right Knee       3/22/24: Patient returns today for right knee pain.  We did perform a right knee corticosteroid injection for her on 12/22/2023.  She felt that it were quite well and would like to have a repeat injection form today.  No additional complaints.    12/22/23: Patient returns today for right knee pain.  We performed a right knee corticosteroid injection on 7/28/2023.  She felt that this worked quite well for her and would like to have a repeat injection performed today.  No additional complaints.    7/28/23: Patient returns today for right knee pain. She states the previous injection she received on 5/5/2023 worked quite well up until recently. She presents today requesting a repeat injection. She denies any further injuries.    5/5/23: Patient returns today for right knee pain. She states the previous injection she received on 1/13/2023 worked quite well for her up until recently. She would like to have a repeat injection today. She denies any further injuries. She has no additional complaints at this time.    1/13/23: Patient returns today for reevaluation for right knee pain. She states that the previous injection she received on 3/21/2022 did provide significant relief up until recently. She presents today requesting repeat injection. She denies any recent injuries.    3/21/22: Sharifa is a very pleasant 77-year-old female here today for evaluation of chronic right knee pain. She states that approximately 6 months ago her knee pain insidiously began to worsen without any acute injury or trauma. Referral by Dr Fernando. She does have a history of partial tear of her right Achilles 4 years ago however, she has no other history of right lower extremity pain. She has no surgical history for this knee. She describes an achy pain about her right anterior and lateral aspect of her knee that is worse with  standing for too long, kneeling, bending, stairs, standing from a seated position and she does feel unstable at times. Pain rated as moderate in intensity, not associated with any swelling or erythema. She has tried naproxen 1 tab about 2-3 times a week, Tylenol arthritis and Vinny green herbal cream for pain relief. She denies any recent injury. She would like to discuss further treatment options today. No other complaints today.       ROS: All pertinent positive symptoms are included in the history of present illness.    All other systems have been reviewed and are negative and noncontributory to this patient's current ailments.    Objective     There were no vitals filed for this visit.    Physical Exam  ysical Exam  General/Constitutional: No apparent distress. Well-nourished and well developed.  Head: Normocephalic, Atraumatic.   Eyes: EOMI.  Vascular: No edema, swelling or tenderness, except as noted in detailed exam.  Respiratory: Non-labored breathing.  Integumentary: No impressive skin lesions present, except as noted in detailed exam.  Neurological: Oriented to person, place, and time.  Psychological: Normal mood and affect.  Musculoskeletal: Normal, except as noted in detailed exam     The right knee is without obvious signs of acute bony deformity, swelling, erythema, ecchymosis or joint effusion. The patella is without tenderness. Apprehension is negative with medial and lateral glide. Patella crepitus is negative. Patella grind is positive. The medial joint line is nontender and without bony crepitus or step-off. The lateral joint line is TTP and without bony crepitus or step-off. Flexion & extension are full and symmetrical. Varus & valgus stress test at 0° and 30° of flexion, Lachman's, Bj's, Apley's, dial test at 90° and 30° of flexion, and posterior drawer are all negative. The opposite knee is nontender and stable. Gait is pain-free and tandem.    Patient ID: Sharifa Moore is a 79 y.o.  female.    L Inj/Asp: R knee on 3/22/2024 11:08 AM  Indications: pain  Details: 25 G needle, ultrasound-guided superolateral approach  Medications: 80 mg triamcinolone acetonide 40 mg/mL; 4 mL lidocaine 10 mg/mL (1 %)  Outcome: tolerated well, no immediate complications  Procedure, treatment alternatives, risks and benefits explained, specific risks discussed. Consent was given by the patient. Immediately prior to procedure a time out was called to verify the correct patient, procedure, equipment, support staff and site/side marked as required. Patient was prepped and draped in the usual sterile fashion.             Assessment/Plan   Problem List Items Addressed This Visit    None  Visit Diagnoses       Arthritis of right knee        Relevant Orders    Point of Care Ultrasound (Completed)          Considering that she did well with previous injections, I feel that a repeat injection today is warranted. Discussed risks versus benefits of having injection performed. Patient has elected to proceed with procedure. Please refer to procedure note above. Discussed with patient to limit weightbearing activities over the next 24-48 hours, they can then progress to full activities as tolerated. Discussed with patient to avoid water submersion over the next 2 days. Discussed with patient to call me immediately if they develop worsening pain, rash, erythema, or fevers. Patient should follow-up as needed if pain persists or worsens.    Param Guzman,   Sports Medicine  [facility]     ** Please excuse any errors in grammar or translation related to this dictation. Voice recognition software was utilized to prepare this document. **

## 2024-04-05 ENCOUNTER — OFFICE VISIT (OUTPATIENT)
Dept: PRIMARY CARE | Facility: CLINIC | Age: 79
End: 2024-04-05
Payer: MEDICARE

## 2024-04-05 VITALS
HEART RATE: 89 BPM | DIASTOLIC BLOOD PRESSURE: 78 MMHG | BODY MASS INDEX: 30.36 KG/M2 | SYSTOLIC BLOOD PRESSURE: 128 MMHG | OXYGEN SATURATION: 97 % | TEMPERATURE: 97 F | WEIGHT: 166 LBS

## 2024-04-05 DIAGNOSIS — R07.9 ACUTE CHEST PAIN: ICD-10-CM

## 2024-04-05 DIAGNOSIS — M25.552 PAIN OF LEFT HIP: ICD-10-CM

## 2024-04-05 DIAGNOSIS — M54.50 LOW BACK PAIN, UNSPECIFIED BACK PAIN LATERALITY, UNSPECIFIED CHRONICITY, UNSPECIFIED WHETHER SCIATICA PRESENT: ICD-10-CM

## 2024-04-05 DIAGNOSIS — M54.6 THORACIC BACK PAIN, UNSPECIFIED BACK PAIN LATERALITY, UNSPECIFIED CHRONICITY: ICD-10-CM

## 2024-04-05 DIAGNOSIS — S23.8XXA SPRAIN OF CHEST WALL, INITIAL ENCOUNTER: Primary | ICD-10-CM

## 2024-04-05 DIAGNOSIS — M25.512 LEFT SHOULDER PAIN, UNSPECIFIED CHRONICITY: ICD-10-CM

## 2024-04-05 PROCEDURE — 1159F MED LIST DOCD IN RCRD: CPT | Performed by: INTERNAL MEDICINE

## 2024-04-05 PROCEDURE — 1036F TOBACCO NON-USER: CPT | Performed by: INTERNAL MEDICINE

## 2024-04-05 PROCEDURE — 99213 OFFICE O/P EST LOW 20 MIN: CPT | Performed by: INTERNAL MEDICINE

## 2024-04-05 PROCEDURE — 1125F AMNT PAIN NOTED PAIN PRSNT: CPT | Performed by: INTERNAL MEDICINE

## 2024-04-05 PROCEDURE — 1160F RVW MEDS BY RX/DR IN RCRD: CPT | Performed by: INTERNAL MEDICINE

## 2024-04-05 RX ORDER — TIZANIDINE HYDROCHLORIDE 4 MG/1
4 CAPSULE, GELATIN COATED ORAL 3 TIMES DAILY
Qty: 14 CAPSULE | Refills: 0 | Status: SHIPPED | OUTPATIENT
Start: 2024-04-05 | End: 2024-04-09 | Stop reason: SDUPTHER

## 2024-04-05 RX ORDER — PREDNISONE 10 MG/1
10 TABLET ORAL DAILY
Qty: 7 TABLET | Refills: 0 | Status: SHIPPED | OUTPATIENT
Start: 2024-04-05 | End: 2024-04-12

## 2024-04-05 ASSESSMENT — ENCOUNTER SYMPTOMS
LOSS OF SENSATION IN FEET: 0
OCCASIONAL FEELINGS OF UNSTEADINESS: 0
DEPRESSION: 0

## 2024-04-05 ASSESSMENT — PATIENT HEALTH QUESTIONNAIRE - PHQ9
SUM OF ALL RESPONSES TO PHQ9 QUESTIONS 1 AND 2: 0
2. FEELING DOWN, DEPRESSED OR HOPELESS: NOT AT ALL
1. LITTLE INTEREST OR PLEASURE IN DOING THINGS: NOT AT ALL

## 2024-04-05 ASSESSMENT — PAIN SCALES - GENERAL: PAINLEVEL: 8

## 2024-04-05 NOTE — PROGRESS NOTES
Subjective   Patient ID: Sharifa Moore is a 79 y.o. female who presents for Spasms (Above left hip).    HPI  79 years old female comes in to see me today because she damaged her left flank or left side of her chest wall.  2 days ago while in her bathroom She Tried to Let Her get out and to do so she tried to open the window by forcing it up and also in the same time spraining the left side of her chest.  She felt the pain right away.  She tried to remedy it by taking ibuprofen 800 mg a day and her daughter use Salonpas on that area by massaging it ending up using some heat.  She is still in pain and her pain is getting worse  Review of Systems  12 system review 12 system negative.  Objective   /78 (BP Location: Right arm, Patient Position: Sitting, BP Cuff Size: Adult)   Pulse 89   Temp 36.1 °C (97 °F) (Temporal)   Wt 75.3 kg (166 lb)   SpO2 97%   BMI 30.36 kg/m²     Physical Exam  Nonicteric sclera no jaundice.  Face symmetrical cranial nerves intact.  Neck supple no masses no lymph node thyromegaly or jugular venous distention.  Lungs clear at least no rales or wheezing.  Heart normal S1 and S2 regular rhythm abdomen benign and neurologically intact.  Agreed to use Salonpas or Voltaren gel Bengay or Motrin gel Tylenol gel.  Agreed also and advised her to use heating pad half an hour on every couple hours only when awake.  Will prescribe prednisone for 7 days because she is on severe pain.  Ibuprofen 800 mg to take with meals 3 times a day only for 3 to 5 days.  Massage your area with gels and other medication.  Use and hug a pillow when walking coughing or severe pain occur.  Assessment/Plan   Diagnoses and all orders for this visit:  Sprain of chest wall, initial encounter

## 2024-04-09 ENCOUNTER — TELEPHONE (OUTPATIENT)
Dept: PRIMARY CARE | Facility: CLINIC | Age: 79
End: 2024-04-09
Payer: MEDICARE

## 2024-04-09 DIAGNOSIS — S23.8XXA SPRAIN OF CHEST WALL, INITIAL ENCOUNTER: ICD-10-CM

## 2024-04-09 RX ORDER — TIZANIDINE HYDROCHLORIDE 4 MG/1
4 CAPSULE, GELATIN COATED ORAL 3 TIMES DAILY
Qty: 21 CAPSULE | Refills: 0 | Status: SHIPPED | OUTPATIENT
Start: 2024-04-09 | End: 2024-04-16

## 2024-04-12 ENCOUNTER — HOSPITAL ENCOUNTER (OUTPATIENT)
Dept: RADIOLOGY | Facility: CLINIC | Age: 79
Discharge: HOME | End: 2024-04-12
Payer: MEDICARE

## 2024-04-12 DIAGNOSIS — M54.50 LOW BACK PAIN, UNSPECIFIED BACK PAIN LATERALITY, UNSPECIFIED CHRONICITY, UNSPECIFIED WHETHER SCIATICA PRESENT: ICD-10-CM

## 2024-04-12 DIAGNOSIS — M25.552 PAIN OF LEFT HIP: ICD-10-CM

## 2024-04-12 DIAGNOSIS — M54.6 THORACIC BACK PAIN, UNSPECIFIED BACK PAIN LATERALITY, UNSPECIFIED CHRONICITY: ICD-10-CM

## 2024-04-12 DIAGNOSIS — M25.512 LEFT SHOULDER PAIN, UNSPECIFIED CHRONICITY: ICD-10-CM

## 2024-04-12 PROCEDURE — 72100 X-RAY EXAM L-S SPINE 2/3 VWS: CPT | Performed by: RADIOLOGY

## 2024-04-12 PROCEDURE — 72070 X-RAY EXAM THORAC SPINE 2VWS: CPT

## 2024-04-12 PROCEDURE — 72070 X-RAY EXAM THORAC SPINE 2VWS: CPT | Performed by: RADIOLOGY

## 2024-04-12 PROCEDURE — 72100 X-RAY EXAM L-S SPINE 2/3 VWS: CPT

## 2024-04-18 ENCOUNTER — APPOINTMENT (OUTPATIENT)
Dept: PRIMARY CARE | Facility: CLINIC | Age: 79
End: 2024-04-18
Payer: MEDICARE

## 2024-05-08 ENCOUNTER — TELEPHONE (OUTPATIENT)
Dept: PRIMARY CARE | Facility: CLINIC | Age: 79
End: 2024-05-08
Payer: MEDICARE

## 2024-05-08 NOTE — TELEPHONE ENCOUNTER
Patient was very appreciative of the call and your care for her however patient prefer a female physician and has an appointment coming up soon she will seek pain management care with new PCP.  Patient says no reflexion on you , you were great with her dad and she really appreciates you. Thank you

## 2024-05-08 NOTE — TELEPHONE ENCOUNTER
----- Message from Dov Jo MD sent at 5/8/2024 11:05 AM EDT -----  Please call the lady and let her know about her x-ray results check back with us if her pain continues and reviewed she may need to consult orthopedist or a back specialist she will let me know what her decision is ,

## 2024-05-13 ENCOUNTER — APPOINTMENT (OUTPATIENT)
Dept: PRIMARY CARE | Facility: CLINIC | Age: 79
End: 2024-05-13
Payer: MEDICARE

## 2024-05-20 ENCOUNTER — OFFICE VISIT (OUTPATIENT)
Dept: PRIMARY CARE | Facility: CLINIC | Age: 79
End: 2024-05-20
Payer: MEDICARE

## 2024-05-20 VITALS
SYSTOLIC BLOOD PRESSURE: 129 MMHG | BODY MASS INDEX: 30.8 KG/M2 | WEIGHT: 168.4 LBS | DIASTOLIC BLOOD PRESSURE: 75 MMHG | HEART RATE: 86 BPM

## 2024-05-20 DIAGNOSIS — E78.2 MIXED HYPERLIPIDEMIA: Primary | ICD-10-CM

## 2024-05-20 DIAGNOSIS — L30.9 DERMATITIS: ICD-10-CM

## 2024-05-20 DIAGNOSIS — R73.01 ELEVATED FASTING GLUCOSE: ICD-10-CM

## 2024-05-20 DIAGNOSIS — M19.071 PRIMARY OSTEOARTHRITIS OF RIGHT ANKLE: ICD-10-CM

## 2024-05-20 DIAGNOSIS — M76.61 ACHILLES TENDINITIS OF RIGHT LOWER EXTREMITY: ICD-10-CM

## 2024-05-20 PROBLEM — D64.9 ANEMIA: Status: RESOLVED | Noted: 2024-05-20 | Resolved: 2024-05-20

## 2024-05-20 PROBLEM — S83.249A TEAR OF MEDIAL MENISCUS OF KNEE: Status: RESOLVED | Noted: 2023-09-20 | Resolved: 2024-05-20

## 2024-05-20 PROBLEM — N76.1 CHRONIC VAGINITIS: Status: RESOLVED | Noted: 2023-09-20 | Resolved: 2024-05-20

## 2024-05-20 PROBLEM — H90.3 ASYMMETRICAL SENSORINEURAL HEARING LOSS: Status: ACTIVE | Noted: 2024-05-20

## 2024-05-20 PROBLEM — I10 HYPERTENSION: Status: ACTIVE | Noted: 2024-05-20

## 2024-05-20 PROBLEM — H25.13 AGE-RELATED NUCLEAR CATARACT OF BOTH EYES: Status: ACTIVE | Noted: 2023-03-14

## 2024-05-20 PROBLEM — H81.10 VERTIGO, BENIGN POSITIONAL: Status: RESOLVED | Noted: 2023-09-20 | Resolved: 2024-05-20

## 2024-05-20 PROBLEM — K92.2 GI BLEED: Status: RESOLVED | Noted: 2018-07-20 | Resolved: 2024-05-20

## 2024-05-20 PROBLEM — Z96.611 HISTORY OF ARTHROPLASTY OF RIGHT SHOULDER: Status: RESOLVED | Noted: 2024-05-20 | Resolved: 2024-05-20

## 2024-05-20 PROBLEM — M19.079 PRIMARY OSTEOARTHRITIS OF ANKLE: Status: ACTIVE | Noted: 2023-09-20

## 2024-05-20 PROBLEM — M17.10 ARTHRITIS OF KNEE: Status: ACTIVE | Noted: 2024-05-20

## 2024-05-20 PROCEDURE — 3074F SYST BP LT 130 MM HG: CPT | Performed by: INTERNAL MEDICINE

## 2024-05-20 PROCEDURE — 1159F MED LIST DOCD IN RCRD: CPT | Performed by: INTERNAL MEDICINE

## 2024-05-20 PROCEDURE — 1160F RVW MEDS BY RX/DR IN RCRD: CPT | Performed by: INTERNAL MEDICINE

## 2024-05-20 PROCEDURE — 99214 OFFICE O/P EST MOD 30 MIN: CPT | Performed by: INTERNAL MEDICINE

## 2024-05-20 PROCEDURE — 3078F DIAST BP <80 MM HG: CPT | Performed by: INTERNAL MEDICINE

## 2024-05-20 RX ORDER — CLINDAMYCIN HYDROCHLORIDE 300 MG/1
CAPSULE ORAL
COMMUNITY
Start: 2024-04-26

## 2024-05-21 PROBLEM — R73.01 ELEVATED FASTING GLUCOSE: Status: ACTIVE | Noted: 2024-05-21

## 2024-05-21 RX ORDER — IBUPROFEN 800 MG/1
800 TABLET ORAL EVERY 8 HOURS PRN
Qty: 40 TABLET | Refills: 1 | Status: SHIPPED | OUTPATIENT
Start: 2024-05-21

## 2024-05-21 ASSESSMENT — ENCOUNTER SYMPTOMS
ADENOPATHY: 0
NERVOUS/ANXIOUS: 0
BACK PAIN: 1
HYPERACTIVE: 0
HEMATURIA: 0
COLOR CHANGE: 0
WEAKNESS: 0
FATIGUE: 0
HEADACHES: 0
DIZZINESS: 0
WHEEZING: 0
LIGHT-HEADEDNESS: 0
RHINORRHEA: 0
PALPITATIONS: 0
SINUS PRESSURE: 0
DECREASED CONCENTRATION: 0
COUGH: 0
BRUISES/BLEEDS EASILY: 0
SINUS PAIN: 0
NAUSEA: 0
FEVER: 0
VOICE CHANGE: 0
DYSURIA: 0
ACTIVITY CHANGE: 0
VOMITING: 0
SORE THROAT: 0
FREQUENCY: 0
ABDOMINAL DISTENTION: 0
ARTHRALGIAS: 1
SHORTNESS OF BREATH: 0
DYSPHORIC MOOD: 0
CHEST TIGHTNESS: 0
ABDOMINAL PAIN: 0
NECK STIFFNESS: 0
DIARRHEA: 0
NUMBNESS: 0
EYE DISCHARGE: 0
SLEEP DISTURBANCE: 0
CONSTIPATION: 0

## 2024-05-21 NOTE — PATIENT INSTRUCTIONS
It was a pleasure meeting you in the office.  Sometime this summer, please return to any  lab to have fasting blood work updated.  If you need any additional refills, please reach out anytime.  Otherwise, if all remains well, we are available of course for any concerns that may arise but we will simply plan on seeing you back in February for your annual wellness visit.

## 2024-05-21 NOTE — PROGRESS NOTES
Sharifaelton Moore comes in today to establish with a new PCP.      Ms. Moore comes in today to establish with a new PCP.  She considers herself generally healthy.  She is followed mainly for mild hyperlipidemia.  She has moderate arthritis of the left hip as well as the right knee and ankle.  This is managed with exercise but she also follows with an orthopedic specialist.  She has proceeded with physical therapy in the past.  Her wellness visit is up-to-date from earlier this year.  She feels well in general, specifically denying any problems of chest pain, pressure, shortness of breath, cough, nausea, vomiting, or changes in her bowel movements.        Review of Systems   Constitutional:  Negative for activity change, fatigue and fever.   HENT:  Negative for congestion, ear pain, postnasal drip, rhinorrhea, sinus pressure, sinus pain, sore throat and voice change.    Eyes:  Negative for discharge and visual disturbance.   Respiratory:  Negative for cough, chest tightness, shortness of breath and wheezing.    Cardiovascular:  Negative for chest pain, palpitations and leg swelling.   Gastrointestinal:  Negative for abdominal distention, abdominal pain, constipation, diarrhea, nausea and vomiting.   Endocrine: Negative for cold intolerance, heat intolerance and polyuria.   Genitourinary:  Negative for dysuria, frequency, hematuria, pelvic pain and urgency.   Musculoskeletal:  Positive for arthralgias, back pain and gait problem. Negative for neck stiffness.   Skin:  Negative for color change, pallor and rash.   Allergic/Immunologic: Negative for environmental allergies, food allergies and immunocompromised state.   Neurological:  Negative for dizziness, weakness, light-headedness, numbness and headaches.   Hematological:  Negative for adenopathy. Does not bruise/bleed easily.   Psychiatric/Behavioral:  Negative for behavioral problems, decreased concentration, dysphoric mood and sleep disturbance. The patient is  not nervous/anxious and is not hyperactive.        Objective   Physical Exam  Constitutional:       General: She is not in acute distress.     Appearance: Normal appearance. She is well-developed. She is not ill-appearing.   HENT:      Head: Normocephalic.      Right Ear: Tympanic membrane, ear canal and external ear normal.      Left Ear: Tympanic membrane, ear canal and external ear normal.      Nose: Nose normal.      Mouth/Throat:      Mouth: Mucous membranes are moist.      Pharynx: Oropharynx is clear. No oropharyngeal exudate or posterior oropharyngeal erythema.   Eyes:      General: Lids are normal. No scleral icterus.     Extraocular Movements: Extraocular movements intact.      Conjunctiva/sclera: Conjunctivae normal.      Pupils: Pupils are equal, round, and reactive to light.   Neck:      Vascular: No carotid bruit.   Cardiovascular:      Rate and Rhythm: Normal rate and regular rhythm.      Pulses: Normal pulses.      Heart sounds: No murmur heard.     No gallop.   Pulmonary:      Effort: Pulmonary effort is normal. No respiratory distress.      Breath sounds: No wheezing, rhonchi or rales.   Abdominal:      General: Bowel sounds are normal. There is no distension.      Palpations: Abdomen is soft. There is no mass.      Tenderness: There is no abdominal tenderness. There is no guarding or rebound.   Musculoskeletal:         General: Swelling (right ankle joint swelling, chronic, non pitting) present.      Cervical back: Normal range of motion and neck supple. No tenderness.      Right lower leg: No edema.      Left lower leg: No edema.   Lymphadenopathy:      Cervical: No cervical adenopathy.   Skin:     General: Skin is warm and dry.      Coloration: Skin is not pale.      Findings: No bruising or rash.   Neurological:      General: No focal deficit present.      Mental Status: She is alert and oriented to person, place, and time.      Cranial Nerves: No cranial nerve deficit.      Motor: No weakness.       Gait: Gait normal.   Psychiatric:         Mood and Affect: Mood normal.         Behavior: Behavior normal.         Judgment: Judgment normal.         Assessment/Plan   Hyperlipidemia: Continues on statin therapy.  Labs recently updated in February, plan on repeating in mid summer and then back to her regular routine with her wellness visit at the start of the year.  Contact us if needs refills  Elevated fasting glucose:  Check A1C with upcoming labs  Osteoarthritis:  managing conservatively, refill provided on ibuprofen for as needed use  Osteoporosis;  Unclear of history, last DEXA in 2020 looked excellent.      We will have her check comprehensive labs in the summer, then plan to see her back in Feb for her wellness visit.  She can call with any questions    Problem List Items Addressed This Visit       Achilles tendinitis of right lower extremity    Dermatitis    Mixed hyperlipidemia - Primary    Osteoporosis

## 2024-06-01 ENCOUNTER — HOSPITAL ENCOUNTER (INPATIENT)
Facility: HOSPITAL | Age: 79
LOS: 1 days | Discharge: HOME | DRG: 392 | End: 2024-06-03
Attending: EMERGENCY MEDICINE | Admitting: INTERNAL MEDICINE
Payer: MEDICARE

## 2024-06-01 ENCOUNTER — APPOINTMENT (OUTPATIENT)
Dept: CARDIOLOGY | Facility: HOSPITAL | Age: 79
DRG: 392 | End: 2024-06-01
Payer: MEDICARE

## 2024-06-01 ENCOUNTER — APPOINTMENT (OUTPATIENT)
Dept: RADIOLOGY | Facility: HOSPITAL | Age: 79
DRG: 392 | End: 2024-06-01
Payer: MEDICARE

## 2024-06-01 DIAGNOSIS — K57.92 ACUTE DIVERTICULITIS: Primary | ICD-10-CM

## 2024-06-01 DIAGNOSIS — K57.92 DIVERTICULITIS: ICD-10-CM

## 2024-06-01 LAB
ALBUMIN SERPL BCP-MCNC: 3.6 G/DL (ref 3.4–5)
ALP SERPL-CCNC: 108 U/L (ref 33–136)
ALT SERPL W P-5'-P-CCNC: 46 U/L (ref 7–45)
ANION GAP SERPL CALC-SCNC: 11 MMOL/L (ref 10–20)
AST SERPL W P-5'-P-CCNC: 44 U/L (ref 9–39)
BASOPHILS # BLD AUTO: 0.03 X10*3/UL (ref 0–0.1)
BASOPHILS NFR BLD AUTO: 0.3 %
BILIRUB SERPL-MCNC: 0.6 MG/DL (ref 0–1.2)
BUN SERPL-MCNC: 15 MG/DL (ref 6–23)
CALCIUM SERPL-MCNC: 8.2 MG/DL (ref 8.6–10.3)
CARDIAC TROPONIN I PNL SERPL HS: 3 NG/L (ref 0–13)
CHLORIDE SERPL-SCNC: 105 MMOL/L (ref 98–107)
CO2 SERPL-SCNC: 26 MMOL/L (ref 21–32)
CREAT SERPL-MCNC: 0.74 MG/DL (ref 0.5–1.05)
EGFRCR SERPLBLD CKD-EPI 2021: 83 ML/MIN/1.73M*2
EOSINOPHIL # BLD AUTO: 0.19 X10*3/UL (ref 0–0.4)
EOSINOPHIL NFR BLD AUTO: 2.1 %
ERYTHROCYTE [DISTWIDTH] IN BLOOD BY AUTOMATED COUNT: 14.3 % (ref 11.5–14.5)
GLUCOSE SERPL-MCNC: 147 MG/DL (ref 74–99)
HCT VFR BLD AUTO: 38 % (ref 36–46)
HGB BLD-MCNC: 12.7 G/DL (ref 12–16)
IMM GRANULOCYTES # BLD AUTO: 0.03 X10*3/UL (ref 0–0.5)
IMM GRANULOCYTES NFR BLD AUTO: 0.3 % (ref 0–0.9)
LIPASE SERPL-CCNC: 27 U/L (ref 9–82)
LYMPHOCYTES # BLD AUTO: 1.52 X10*3/UL (ref 0.8–3)
LYMPHOCYTES NFR BLD AUTO: 16.6 %
MAGNESIUM SERPL-MCNC: 2.2 MG/DL (ref 1.6–2.4)
MCH RBC QN AUTO: 30.2 PG (ref 26–34)
MCHC RBC AUTO-ENTMCNC: 33.4 G/DL (ref 32–36)
MCV RBC AUTO: 91 FL (ref 80–100)
MONOCYTES # BLD AUTO: 0.7 X10*3/UL (ref 0.05–0.8)
MONOCYTES NFR BLD AUTO: 7.7 %
NEUTROPHILS # BLD AUTO: 6.68 X10*3/UL (ref 1.6–5.5)
NEUTROPHILS NFR BLD AUTO: 73 %
NRBC BLD-RTO: 0 /100 WBCS (ref 0–0)
PLATELET # BLD AUTO: 216 X10*3/UL (ref 150–450)
POTASSIUM SERPL-SCNC: 3.7 MMOL/L (ref 3.5–5.3)
PROT SERPL-MCNC: 6.2 G/DL (ref 6.4–8.2)
RBC # BLD AUTO: 4.2 X10*6/UL (ref 4–5.2)
SODIUM SERPL-SCNC: 138 MMOL/L (ref 136–145)
WBC # BLD AUTO: 9.2 X10*3/UL (ref 4.4–11.3)

## 2024-06-01 PROCEDURE — 96366 THER/PROPH/DIAG IV INF ADDON: CPT

## 2024-06-01 PROCEDURE — 80053 COMPREHEN METABOLIC PANEL: CPT | Performed by: EMERGENCY MEDICINE

## 2024-06-01 PROCEDURE — 85025 COMPLETE CBC W/AUTO DIFF WBC: CPT | Performed by: EMERGENCY MEDICINE

## 2024-06-01 PROCEDURE — 2500000004 HC RX 250 GENERAL PHARMACY W/ HCPCS (ALT 636 FOR OP/ED): Performed by: EMERGENCY MEDICINE

## 2024-06-01 PROCEDURE — 83690 ASSAY OF LIPASE: CPT | Performed by: EMERGENCY MEDICINE

## 2024-06-01 PROCEDURE — 83735 ASSAY OF MAGNESIUM: CPT | Performed by: PHYSICIAN ASSISTANT

## 2024-06-01 PROCEDURE — 96365 THER/PROPH/DIAG IV INF INIT: CPT

## 2024-06-01 PROCEDURE — 36415 COLL VENOUS BLD VENIPUNCTURE: CPT | Performed by: EMERGENCY MEDICINE

## 2024-06-01 PROCEDURE — G0378 HOSPITAL OBSERVATION PER HR: HCPCS

## 2024-06-01 PROCEDURE — 99285 EMERGENCY DEPT VISIT HI MDM: CPT

## 2024-06-01 PROCEDURE — 96375 TX/PRO/DX INJ NEW DRUG ADDON: CPT

## 2024-06-01 PROCEDURE — 74177 CT ABD & PELVIS W/CONTRAST: CPT

## 2024-06-01 PROCEDURE — 74177 CT ABD & PELVIS W/CONTRAST: CPT | Performed by: STUDENT IN AN ORGANIZED HEALTH CARE EDUCATION/TRAINING PROGRAM

## 2024-06-01 PROCEDURE — 84484 ASSAY OF TROPONIN QUANT: CPT | Performed by: PHYSICIAN ASSISTANT

## 2024-06-01 PROCEDURE — 2550000001 HC RX 255 CONTRASTS: Performed by: PHYSICIAN ASSISTANT

## 2024-06-01 PROCEDURE — 93005 ELECTROCARDIOGRAM TRACING: CPT

## 2024-06-01 PROCEDURE — 2500000004 HC RX 250 GENERAL PHARMACY W/ HCPCS (ALT 636 FOR OP/ED): Performed by: NURSE PRACTITIONER

## 2024-06-01 RX ORDER — KETOROLAC TROMETHAMINE 30 MG/ML
15 INJECTION, SOLUTION INTRAMUSCULAR; INTRAVENOUS EVERY 6 HOURS SCHEDULED
Status: DISPENSED | OUTPATIENT
Start: 2024-06-02 | End: 2024-06-02

## 2024-06-01 RX ORDER — KETOROLAC TROMETHAMINE 30 MG/ML
15 INJECTION, SOLUTION INTRAMUSCULAR; INTRAVENOUS ONCE
Status: COMPLETED | OUTPATIENT
Start: 2024-06-01 | End: 2024-06-01

## 2024-06-01 RX ORDER — METRONIDAZOLE 500 MG/100ML
500 INJECTION, SOLUTION INTRAVENOUS EVERY 8 HOURS
Status: DISCONTINUED | OUTPATIENT
Start: 2024-06-02 | End: 2024-06-03 | Stop reason: HOSPADM

## 2024-06-01 RX ORDER — SODIUM CHLORIDE, SODIUM LACTATE, POTASSIUM CHLORIDE, CALCIUM CHLORIDE 600; 310; 30; 20 MG/100ML; MG/100ML; MG/100ML; MG/100ML
75 INJECTION, SOLUTION INTRAVENOUS CONTINUOUS
Status: DISCONTINUED | OUTPATIENT
Start: 2024-06-01 | End: 2024-06-03 | Stop reason: HOSPADM

## 2024-06-01 RX ORDER — CIPROFLOXACIN 2 MG/ML
400 INJECTION, SOLUTION INTRAVENOUS EVERY 12 HOURS
Status: DISCONTINUED | OUTPATIENT
Start: 2024-06-02 | End: 2024-06-03 | Stop reason: HOSPADM

## 2024-06-01 RX ORDER — CIPROFLOXACIN 2 MG/ML
400 INJECTION, SOLUTION INTRAVENOUS ONCE
Status: COMPLETED | OUTPATIENT
Start: 2024-06-01 | End: 2024-06-01

## 2024-06-01 RX ORDER — ATORVASTATIN CALCIUM 10 MG/1
10 TABLET, FILM COATED ORAL DAILY
Status: DISCONTINUED | OUTPATIENT
Start: 2024-06-02 | End: 2024-06-02

## 2024-06-01 RX ORDER — TRAZODONE HYDROCHLORIDE 50 MG/1
50 TABLET ORAL NIGHTLY PRN
Status: DISCONTINUED | OUTPATIENT
Start: 2024-06-02 | End: 2024-06-03 | Stop reason: HOSPADM

## 2024-06-01 RX ORDER — METRONIDAZOLE 500 MG/100ML
500 INJECTION, SOLUTION INTRAVENOUS ONCE
Status: COMPLETED | OUTPATIENT
Start: 2024-06-01 | End: 2024-06-01

## 2024-06-01 RX ADMIN — SODIUM CHLORIDE, POTASSIUM CHLORIDE, SODIUM LACTATE AND CALCIUM CHLORIDE 1000 ML: 600; 310; 30; 20 INJECTION, SOLUTION INTRAVENOUS at 20:53

## 2024-06-01 RX ADMIN — METRONIDAZOLE 500 MG: 500 INJECTION, SOLUTION INTRAVENOUS at 20:52

## 2024-06-01 RX ADMIN — KETOROLAC TROMETHAMINE 15 MG: 30 INJECTION, SOLUTION INTRAMUSCULAR at 20:53

## 2024-06-01 RX ADMIN — SODIUM CHLORIDE, POTASSIUM CHLORIDE, SODIUM LACTATE AND CALCIUM CHLORIDE 75 ML/HR: 600; 310; 30; 20 INJECTION, SOLUTION INTRAVENOUS at 23:57

## 2024-06-01 RX ADMIN — CIPROFLOXACIN 400 MG: 400 INJECTION, SOLUTION INTRAVENOUS at 22:02

## 2024-06-01 RX ADMIN — IOHEXOL 75 ML: 350 INJECTION, SOLUTION INTRAVENOUS at 18:57

## 2024-06-01 SDOH — SOCIAL STABILITY: SOCIAL INSECURITY: DO YOU FEEL ANYONE HAS EXPLOITED OR TAKEN ADVANTAGE OF YOU FINANCIALLY OR OF YOUR PERSONAL PROPERTY?: NO

## 2024-06-01 SDOH — SOCIAL STABILITY: SOCIAL INSECURITY: HAS ANYONE EVER THREATENED TO HURT YOUR FAMILY OR YOUR PETS?: NO

## 2024-06-01 SDOH — SOCIAL STABILITY: SOCIAL INSECURITY: ABUSE: ADULT

## 2024-06-01 SDOH — SOCIAL STABILITY: SOCIAL INSECURITY: HAVE YOU HAD THOUGHTS OF HARMING ANYONE ELSE?: NO

## 2024-06-01 SDOH — SOCIAL STABILITY: SOCIAL INSECURITY: ARE THERE ANY APPARENT SIGNS OF INJURIES/BEHAVIORS THAT COULD BE RELATED TO ABUSE/NEGLECT?: NO

## 2024-06-01 SDOH — SOCIAL STABILITY: SOCIAL INSECURITY: WERE YOU ABLE TO COMPLETE ALL THE BEHAVIORAL HEALTH SCREENINGS?: YES

## 2024-06-01 SDOH — SOCIAL STABILITY: SOCIAL INSECURITY: DOES ANYONE TRY TO KEEP YOU FROM HAVING/CONTACTING OTHER FRIENDS OR DOING THINGS OUTSIDE YOUR HOME?: NO

## 2024-06-01 SDOH — SOCIAL STABILITY: SOCIAL INSECURITY: HAVE YOU HAD ANY THOUGHTS OF HARMING ANYONE ELSE?: NO

## 2024-06-01 SDOH — SOCIAL STABILITY: SOCIAL INSECURITY: DO YOU FEEL UNSAFE GOING BACK TO THE PLACE WHERE YOU ARE LIVING?: NO

## 2024-06-01 SDOH — SOCIAL STABILITY: SOCIAL INSECURITY: ARE YOU OR HAVE YOU BEEN THREATENED OR ABUSED PHYSICALLY, EMOTIONALLY, OR SEXUALLY BY ANYONE?: NO

## 2024-06-01 ASSESSMENT — PAIN - FUNCTIONAL ASSESSMENT
PAIN_FUNCTIONAL_ASSESSMENT: 0-10
PAIN_FUNCTIONAL_ASSESSMENT: 0-10

## 2024-06-01 ASSESSMENT — PAIN SCALES - GENERAL
PAINLEVEL_OUTOF10: 0 - NO PAIN
PAINLEVEL_OUTOF10: 0 - NO PAIN
PAINLEVEL_OUTOF10: 7
PAINLEVEL_OUTOF10: 3

## 2024-06-01 ASSESSMENT — ACTIVITIES OF DAILY LIVING (ADL)
HEARING - RIGHT EAR: HEARING AID
WALKS IN HOME: INDEPENDENT
GROOMING: INDEPENDENT
ADEQUATE_TO_COMPLETE_ADL: YES
LACK_OF_TRANSPORTATION: NO
PATIENT'S MEMORY ADEQUATE TO SAFELY COMPLETE DAILY ACTIVITIES?: YES
BATHING: INDEPENDENT
DRESSING YOURSELF: INDEPENDENT
JUDGMENT_ADEQUATE_SAFELY_COMPLETE_DAILY_ACTIVITIES: YES
ASSISTIVE_DEVICE: EYEGLASSES;HEARING AID - RIGHT;HEARING AID - LEFT
FEEDING YOURSELF: INDEPENDENT
TOILETING: INDEPENDENT
HEARING - LEFT EAR: HEARING AID

## 2024-06-01 ASSESSMENT — COGNITIVE AND FUNCTIONAL STATUS - GENERAL
PATIENT BASELINE BEDBOUND: NO
MOBILITY SCORE: 24
DAILY ACTIVITIY SCORE: 24

## 2024-06-01 ASSESSMENT — LIFESTYLE VARIABLES
HOW OFTEN DO YOU HAVE 6 OR MORE DRINKS ON ONE OCCASION: NEVER
HOW OFTEN DO YOU HAVE A DRINK CONTAINING ALCOHOL: NEVER
SUBSTANCE_ABUSE_PAST_12_MONTHS: NO
AUDIT-C TOTAL SCORE: 0
PRESCIPTION_ABUSE_PAST_12_MONTHS: NO
AUDIT-C TOTAL SCORE: 0
SKIP TO QUESTIONS 9-10: 1
HOW MANY STANDARD DRINKS CONTAINING ALCOHOL DO YOU HAVE ON A TYPICAL DAY: PATIENT DOES NOT DRINK

## 2024-06-01 ASSESSMENT — COLUMBIA-SUICIDE SEVERITY RATING SCALE - C-SSRS
1. IN THE PAST MONTH, HAVE YOU WISHED YOU WERE DEAD OR WISHED YOU COULD GO TO SLEEP AND NOT WAKE UP?: NO
2. HAVE YOU ACTUALLY HAD ANY THOUGHTS OF KILLING YOURSELF?: NO
6. HAVE YOU EVER DONE ANYTHING, STARTED TO DO ANYTHING, OR PREPARED TO DO ANYTHING TO END YOUR LIFE?: NO

## 2024-06-01 ASSESSMENT — PAIN DESCRIPTION - LOCATION: LOCATION: ABDOMEN

## 2024-06-01 ASSESSMENT — PATIENT HEALTH QUESTIONNAIRE - PHQ9
1. LITTLE INTEREST OR PLEASURE IN DOING THINGS: NOT AT ALL
2. FEELING DOWN, DEPRESSED OR HOPELESS: NOT AT ALL
SUM OF ALL RESPONSES TO PHQ9 QUESTIONS 1 & 2: 0

## 2024-06-01 ASSESSMENT — PAIN DESCRIPTION - ORIENTATION: ORIENTATION: RIGHT;UPPER

## 2024-06-01 NOTE — ED TRIAGE NOTES
TRIAGE NOTE   I saw the patient as the Clinician in Triage and performed a brief history and physical exam, established acuity, and ordered appropriate tests to develop basic plan of care. Patient will be seen by an [facility], resident and/or physician who will independently evaluate the patient. Please see subsequent provider notes for further details and disposition.     Brief HPI: In brief, Sharifa Moore is a 79 y.o. female that presents for right lower quadrant pain that began yesterday.  No nausea vomiting diarrhea constipation or melena.  No urinary symptoms.  Appetite is normal.  No fever.  No prior abdominal surgeries.  Was seen in urgent care sent to ED for imaging..     Focused Physical exam:   Afebrile.  Vital signs stable.  Ambulatory.  No distress.  Abdomen soft tender right lateral abdomen.  Nonsurgical exam.    Plan/MDM:   Nipp orders initiated.  CT imaging ordered.  Patient stable pending bed availability and further evaluation.  Please see subsequent provider note for further details and disposition

## 2024-06-02 LAB
ANION GAP SERPL CALC-SCNC: 12 MMOL/L (ref 10–20)
APPEARANCE UR: CLEAR
BILIRUB UR STRIP.AUTO-MCNC: NEGATIVE MG/DL
BUN SERPL-MCNC: 12 MG/DL (ref 6–23)
CALCIUM SERPL-MCNC: 8 MG/DL (ref 8.6–10.3)
CHLORIDE SERPL-SCNC: 106 MMOL/L (ref 98–107)
CO2 SERPL-SCNC: 25 MMOL/L (ref 21–32)
COLOR UR: ABNORMAL
CREAT SERPL-MCNC: 0.62 MG/DL (ref 0.5–1.05)
EGFRCR SERPLBLD CKD-EPI 2021: >90 ML/MIN/1.73M*2
ERYTHROCYTE [DISTWIDTH] IN BLOOD BY AUTOMATED COUNT: 14.4 % (ref 11.5–14.5)
GLUCOSE SERPL-MCNC: 105 MG/DL (ref 74–99)
GLUCOSE UR STRIP.AUTO-MCNC: NORMAL MG/DL
HCT VFR BLD AUTO: 36.3 % (ref 36–46)
HGB BLD-MCNC: 11.9 G/DL (ref 12–16)
HOLD SPECIMEN: NORMAL
KETONES UR STRIP.AUTO-MCNC: NEGATIVE MG/DL
LEUKOCYTE ESTERASE UR QL STRIP.AUTO: ABNORMAL
MCH RBC QN AUTO: 29.8 PG (ref 26–34)
MCHC RBC AUTO-ENTMCNC: 32.8 G/DL (ref 32–36)
MCV RBC AUTO: 91 FL (ref 80–100)
NITRITE UR QL STRIP.AUTO: NEGATIVE
NRBC BLD-RTO: 0 /100 WBCS (ref 0–0)
PH UR STRIP.AUTO: 5.5 [PH]
PLATELET # BLD AUTO: 200 X10*3/UL (ref 150–450)
POTASSIUM SERPL-SCNC: 3.5 MMOL/L (ref 3.5–5.3)
PROT UR STRIP.AUTO-MCNC: ABNORMAL MG/DL
RBC # BLD AUTO: 4 X10*6/UL (ref 4–5.2)
RBC # UR STRIP.AUTO: ABNORMAL /UL
RBC #/AREA URNS AUTO: ABNORMAL /HPF
SODIUM SERPL-SCNC: 139 MMOL/L (ref 136–145)
SP GR UR STRIP.AUTO: 1.04
SQUAMOUS #/AREA URNS AUTO: ABNORMAL /HPF
UROBILINOGEN UR STRIP.AUTO-MCNC: NORMAL MG/DL
WBC # BLD AUTO: 8 X10*3/UL (ref 4.4–11.3)
WBC #/AREA URNS AUTO: ABNORMAL /HPF

## 2024-06-02 PROCEDURE — 81001 URINALYSIS AUTO W/SCOPE: CPT | Performed by: EMERGENCY MEDICINE

## 2024-06-02 PROCEDURE — 87086 URINE CULTURE/COLONY COUNT: CPT | Mod: AHULAB | Performed by: EMERGENCY MEDICINE

## 2024-06-02 PROCEDURE — 85027 COMPLETE CBC AUTOMATED: CPT | Performed by: NURSE PRACTITIONER

## 2024-06-02 PROCEDURE — 36415 COLL VENOUS BLD VENIPUNCTURE: CPT | Performed by: NURSE PRACTITIONER

## 2024-06-02 PROCEDURE — 2500000001 HC RX 250 WO HCPCS SELF ADMINISTERED DRUGS (ALT 637 FOR MEDICARE OP): Performed by: NURSE PRACTITIONER

## 2024-06-02 PROCEDURE — G0378 HOSPITAL OBSERVATION PER HR: HCPCS

## 2024-06-02 PROCEDURE — 2500000004 HC RX 250 GENERAL PHARMACY W/ HCPCS (ALT 636 FOR OP/ED): Performed by: NURSE PRACTITIONER

## 2024-06-02 PROCEDURE — 80048 BASIC METABOLIC PNL TOTAL CA: CPT | Performed by: NURSE PRACTITIONER

## 2024-06-02 PROCEDURE — 2500000001 HC RX 250 WO HCPCS SELF ADMINISTERED DRUGS (ALT 637 FOR MEDICARE OP): Performed by: INTERNAL MEDICINE

## 2024-06-02 RX ORDER — ONDANSETRON HYDROCHLORIDE 2 MG/ML
4 INJECTION, SOLUTION INTRAVENOUS EVERY 8 HOURS PRN
Status: DISCONTINUED | OUTPATIENT
Start: 2024-06-02 | End: 2024-06-03 | Stop reason: HOSPADM

## 2024-06-02 RX ORDER — ENOXAPARIN SODIUM 100 MG/ML
40 INJECTION SUBCUTANEOUS EVERY 24 HOURS
Status: DISCONTINUED | OUTPATIENT
Start: 2024-06-02 | End: 2024-06-03 | Stop reason: HOSPADM

## 2024-06-02 RX ORDER — ATORVASTATIN CALCIUM 10 MG/1
10 TABLET, FILM COATED ORAL NIGHTLY
Status: DISCONTINUED | OUTPATIENT
Start: 2024-06-02 | End: 2024-06-03 | Stop reason: HOSPADM

## 2024-06-02 RX ORDER — TRAMADOL HYDROCHLORIDE 50 MG/1
50 TABLET ORAL EVERY 6 HOURS PRN
Status: DISCONTINUED | OUTPATIENT
Start: 2024-06-02 | End: 2024-06-03 | Stop reason: HOSPADM

## 2024-06-02 RX ORDER — ACETAMINOPHEN 325 MG/1
650 TABLET ORAL EVERY 6 HOURS
Status: DISCONTINUED | OUTPATIENT
Start: 2024-06-02 | End: 2024-06-03 | Stop reason: HOSPADM

## 2024-06-02 RX ORDER — ACETAMINOPHEN 325 MG/1
650 TABLET ORAL EVERY 6 HOURS PRN
Status: DISCONTINUED | OUTPATIENT
Start: 2024-06-02 | End: 2024-06-02

## 2024-06-02 RX ORDER — POLYETHYLENE GLYCOL 3350 17 G/17G
17 POWDER, FOR SOLUTION ORAL DAILY PRN
Status: DISCONTINUED | OUTPATIENT
Start: 2024-06-02 | End: 2024-06-03 | Stop reason: HOSPADM

## 2024-06-02 RX ADMIN — KETOROLAC TROMETHAMINE 15 MG: 30 INJECTION, SOLUTION INTRAMUSCULAR at 02:39

## 2024-06-02 RX ADMIN — TRAMADOL HYDROCHLORIDE 50 MG: 50 TABLET, COATED ORAL at 23:17

## 2024-06-02 RX ADMIN — CIPROFLOXACIN 400 MG: 400 INJECTION, SOLUTION INTRAVENOUS at 10:52

## 2024-06-02 RX ADMIN — CIPROFLOXACIN 400 MG: 400 INJECTION, SOLUTION INTRAVENOUS at 22:47

## 2024-06-02 RX ADMIN — METRONIDAZOLE 500 MG: 500 INJECTION, SOLUTION INTRAVENOUS at 20:25

## 2024-06-02 RX ADMIN — METRONIDAZOLE 500 MG: 500 INJECTION, SOLUTION INTRAVENOUS at 05:17

## 2024-06-02 RX ADMIN — SODIUM CHLORIDE, POTASSIUM CHLORIDE, SODIUM LACTATE AND CALCIUM CHLORIDE 75 ML/HR: 600; 310; 30; 20 INJECTION, SOLUTION INTRAVENOUS at 17:44

## 2024-06-02 RX ADMIN — ACETAMINOPHEN 650 MG: 325 TABLET ORAL at 23:17

## 2024-06-02 RX ADMIN — ATORVASTATIN CALCIUM 10 MG: 10 TABLET, FILM COATED ORAL at 20:24

## 2024-06-02 RX ADMIN — METRONIDAZOLE 500 MG: 500 INJECTION, SOLUTION INTRAVENOUS at 12:12

## 2024-06-02 ASSESSMENT — COGNITIVE AND FUNCTIONAL STATUS - GENERAL
CLIMB 3 TO 5 STEPS WITH RAILING: A LITTLE
MOBILITY SCORE: 23
DAILY ACTIVITIY SCORE: 24
MOBILITY SCORE: 24

## 2024-06-02 ASSESSMENT — ENCOUNTER SYMPTOMS
NAUSEA: 0
CONFUSION: 0
DIARRHEA: 0
FREQUENCY: 0
CONSTIPATION: 1
ABDOMINAL PAIN: 1
VOMITING: 0
CHILLS: 0
FEVER: 0
PALPITATIONS: 0
HEADACHES: 0
SORE THROAT: 0
BLOOD IN STOOL: 0
DIZZINESS: 0
FLANK PAIN: 0
SHORTNESS OF BREATH: 0
APPETITE CHANGE: 1
CHEST TIGHTNESS: 0

## 2024-06-02 ASSESSMENT — PAIN - FUNCTIONAL ASSESSMENT
PAIN_FUNCTIONAL_ASSESSMENT: 0-10
PAIN_FUNCTIONAL_ASSESSMENT: 0-10

## 2024-06-02 ASSESSMENT — PAIN SCALES - GENERAL
PAINLEVEL_OUTOF10: 0 - NO PAIN
PAINLEVEL_OUTOF10: 8

## 2024-06-02 ASSESSMENT — PAIN DESCRIPTION - LOCATION: LOCATION: ABDOMEN

## 2024-06-02 ASSESSMENT — PAIN DESCRIPTION - ORIENTATION: ORIENTATION: RIGHT

## 2024-06-02 NOTE — H&P
[facility] OBSERVATION H&P    Admitting Physician: Nikki Sheets MD  Admitting Dx: Acute diverticulitis    HPI: Sharifa Moore is a 79 y.o. female, with a PMH of OA, seasonal allergies, insomnia, and HLD, who presented to [facility] ED on 2024 for RLQ pain. She initially presented to urgent care and had an unremarkable workup there and was referred to the ED for further workup and imaging. She reports constipation x days, followed by RLQ pain which started ~ 3 days ago. Reports decreased appetite. Denies red/black stools. Denies nausea, voimiting. Denies fever or chills. She denies any fever, chills, n/v, changes in bowel habits, chest pain, SOB, headaches, or dizziness.    In the ED, VSS though initial BP soft 98/54. Labs unremarkable, UA +leukocytes but otherwise normal. CT abd/pel positive for acute diverticulitis. Patient was given IV abx and admitted for further management.    Subjective   Past Medical History:   Diagnosis Date    Allergic     Anemia 2024    Arthritis     Chronic vaginitis 2023    GI bleed 2018    History of arthroplasty of right shoulder 2024    Pain in left hip 2022    Left hip pain    Personal history of urinary (tract) infections 2014    Personal history of urinary tract infection    Scoliosis     Subacute and chronic vaginitis 2015    Chronic vaginitis    Tear of medial meniscus of knee 2023    Vertigo, benign positional 2023     Past Surgical History:   Procedure Laterality Date     SECTION, CLASSIC  2014     Section     SECTION, LOW TRANSVERSE      COLONOSCOPY  2014    Complete Colonoscopy    FOOT SURGERY Left     TOTAL SHOULDER ARTHROPLASTY Right     WISDOM TOOTH EXTRACTION       Social History     Tobacco Use    Smoking status: Never    Smokeless tobacco: Never   Substance Use Topics    Alcohol use: Never    Drug use: Never     Family History   Problem Relation Name Age of Onset     Osteoporosis Mother Anne-Marie Conley     Stroke Mother Anne-Marie Conley     Colon cancer Father Haris Conley     Hypertension Father Haris Conley     Migraines Father Haris Conley     Prostate cancer Father Haris Conley     Arthritis Father Haris Conley     Kidney disease Father Haris Conley     Asthma Daughter Elmira Moore     Diabetes Daughter Elmira Moore     Breast cancer Maternal Grandmother Alena Thomas     Cancer Maternal Grandmother Alena Thomas     Hypertension Other      Cancer Other         Allergies   Allergen Reactions    Ampicillin Unknown    Penicillins Hives    Sulfamethoxazole Hives       Prior to Admission medications    Medication Sig Start Date End Date Taking? Authorizing Provider   acetaminophen (Tylenol Arthritis Pain) 650 mg ER tablet Take by mouth every 8 hours if needed.   Yes Historical Provider, MD   atorvastatin (Lipitor) 10 mg tablet TAKE 1 TABLET BY MOUTH EVERY DAY 7/1/23 6/30/24 Yes Dov Jo MD   clindamycin (Cleocin) 300 mg capsule TAKE 2 CAPSULES BY MOUTH ONE HOUR PRIOR TO APPT 4/26/24  Yes Historical Provider, MD   fexofenadine (Allegra) 180 mg tablet Take by mouth.   Yes Historical Provider, MD   ibuprofen 800 mg tablet Take 1 tablet (800 mg) by mouth every 8 hours if needed for moderate pain (4 - 6). 5/21/24  Yes Nicole Quiles MD   tiZANidine (Zanaflex) 4 mg capsule Take 1 capsule (4 mg) by mouth 3 times a day for 7 days.  Patient not taking: Reported on 6/1/2024 4/9/24 4/16/24  Dov Jo MD   traZODone (Desyrel) 50 mg tablet Take 1 tablet (50 mg) by mouth as needed at bedtime for sleep.  Patient not taking: Reported on 6/1/2024 2/23/24 2/22/25  Dov Jo MD     Review of Systems   Constitutional:  Positive for appetite change. Negative for chills and fever.   HENT:  Negative for congestion and sore throat.    Respiratory:  Negative for chest tightness and shortness of breath.    Cardiovascular:  Negative for chest pain and palpitations.   Gastrointestinal:   "Positive for abdominal pain (RLQ) and constipation. Negative for blood in stool, diarrhea, nausea and vomiting.   Genitourinary:  Negative for flank pain and frequency.   Neurological:  Negative for dizziness and headaches.   Psychiatric/Behavioral:  Negative for confusion.           Objective   Heart Rate:  [63-85]   Temp:  [36.2 °C (97.2 °F)-36.7 °C (98.1 °F)]   Resp:  [15-18]   BP: ()/(54-79)   Height:  [157.5 cm (5' 2\")]   Weight:  [72.6 kg (160 lb)-75.3 kg (166 lb)]   SpO2:  [95 %-99 %]      Pain Score: 0 - No pain   Vitals:    06/01/24 2241   Weight: 75.3 kg (166 lb)        Intake/Output Summary (Last 24 hours) at 6/2/2024 1135  Last data filed at 6/2/2024 0517  Gross per 24 hour   Intake 1227.75 ml   Output 250 ml   Net 977.75 ml       Physical Exam  Constitutional:       General: She is not in acute distress.     Appearance: She is not toxic-appearing.   HENT:      Head: Normocephalic and atraumatic.      Right Ear: External ear normal.      Left Ear: External ear normal.      Nose: Nose normal. No congestion.      Mouth/Throat:      Mouth: Mucous membranes are moist.      Pharynx: Oropharynx is clear.   Eyes:      General:         Right eye: No discharge.         Left eye: No discharge.      Conjunctiva/sclera: Conjunctivae normal.      Pupils: Pupils are equal, round, and reactive to light.   Cardiovascular:      Rate and Rhythm: Normal rate and regular rhythm.      Heart sounds: No murmur heard.     No gallop.   Pulmonary:      Effort: Pulmonary effort is normal.      Breath sounds: No wheezing or rales.   Abdominal:      General: Abdomen is flat. Bowel sounds are normal.      Palpations: Abdomen is soft.      Tenderness: There is abdominal tenderness (Mild TTP over RLQ without guarding or rigidity). There is no guarding or rebound.   Musculoskeletal:         General: No swelling or tenderness. Normal range of motion.      Right lower leg: No edema.      Left lower leg: No edema.   Skin:     " General: Skin is warm and dry.      Findings: No erythema or rash.   Neurological:      General: No focal deficit present.      Mental Status: She is alert.      Cranial Nerves: No cranial nerve deficit.      Motor: No weakness.   Psychiatric:         Mood and Affect: Mood normal.         Thought Content: Thought content normal.           Medications  atorvastatin, 10 mg, oral, Nightly  ciprofloxacin, 400 mg, intravenous, q12h  enoxaparin, 40 mg, subcutaneous, q24h  ketorolac, 15 mg, intravenous, q6h TOREY  metroNIDAZOLE, 500 mg, intravenous, q8h    lactated Ringer's, 75 mL/hr, Last Rate: 75 mL/hr (06/02/24 0020)    PRN medications: acetaminophen, ondansetron, polyethylene glycol, traZODone    Labs  Results for orders placed or performed during the hospital encounter of 06/01/24 (from the past 24 hour(s))   CBC with Differential   Result Value Ref Range    WBC 9.2 4.4 - 11.3 x10*3/uL    nRBC 0.0 0.0 - 0.0 /100 WBCs    RBC 4.20 4.00 - 5.20 x10*6/uL    Hemoglobin 12.7 12.0 - 16.0 g/dL    Hematocrit 38.0 36.0 - 46.0 %    MCV 91 80 - 100 fL    MCH 30.2 26.0 - 34.0 pg    MCHC 33.4 32.0 - 36.0 g/dL    RDW 14.3 11.5 - 14.5 %    Platelets 216 150 - 450 x10*3/uL    Neutrophils % 73.0 40.0 - 80.0 %    Immature Granulocytes %, Automated 0.3 0.0 - 0.9 %    Lymphocytes % 16.6 13.0 - 44.0 %    Monocytes % 7.7 2.0 - 10.0 %    Eosinophils % 2.1 0.0 - 6.0 %    Basophils % 0.3 0.0 - 2.0 %    Neutrophils Absolute 6.68 (H) 1.60 - 5.50 x10*3/uL    Immature Granulocytes Absolute, Automated 0.03 0.00 - 0.50 x10*3/uL    Lymphocytes Absolute 1.52 0.80 - 3.00 x10*3/uL    Monocytes Absolute 0.70 0.05 - 0.80 x10*3/uL    Eosinophils Absolute 0.19 0.00 - 0.40 x10*3/uL    Basophils Absolute 0.03 0.00 - 0.10 x10*3/uL   Comprehensive Metabolic Panel   Result Value Ref Range    Glucose 147 (H) 74 - 99 mg/dL    Sodium 138 136 - 145 mmol/L    Potassium 3.7 3.5 - 5.3 mmol/L    Chloride 105 98 - 107 mmol/L    Bicarbonate 26 21 - 32 mmol/L    Anion Gap 11  10 - 20 mmol/L    Urea Nitrogen 15 6 - 23 mg/dL    Creatinine 0.74 0.50 - 1.05 mg/dL    eGFR 83 >60 mL/min/1.73m*2    Calcium 8.2 (L) 8.6 - 10.3 mg/dL    Albumin 3.6 3.4 - 5.0 g/dL    Alkaline Phosphatase 108 33 - 136 U/L    Total Protein 6.2 (L) 6.4 - 8.2 g/dL    AST 44 (H) 9 - 39 U/L    Bilirubin, Total 0.6 0.0 - 1.2 mg/dL    ALT 46 (H) 7 - 45 U/L   Lipase   Result Value Ref Range    Lipase 27 9 - 82 U/L   Magnesium   Result Value Ref Range    Magnesium 2.20 1.60 - 2.40 mg/dL   Troponin I, High Sensitivity   Result Value Ref Range    Troponin I, High Sensitivity 3 0 - 13 ng/L   Urinalysis with Reflex Culture and Microscopic   Result Value Ref Range    Color, Urine Light-Yellow Light-Yellow, Yellow, Dark-Yellow    Appearance, Urine Clear Clear    Specific Gravity, Urine 1.045 (N) 1.005 - 1.035    pH, Urine 5.5 5.0, 5.5, 6.0, 6.5, 7.0, 7.5, 8.0    Protein, Urine 10 (TRACE) NEGATIVE, 10 (TRACE), 20 (TRACE) mg/dL    Glucose, Urine Normal Normal mg/dL    Blood, Urine 0.1 (1+) (A) NEGATIVE    Ketones, Urine NEGATIVE NEGATIVE mg/dL    Bilirubin, Urine NEGATIVE NEGATIVE    Urobilinogen, Urine Normal Normal mg/dL    Nitrite, Urine NEGATIVE NEGATIVE    Leukocyte Esterase, Urine 250 S[place]ail/µL (A) NEGATIVE   Microscopic Only, Urine   Result Value Ref Range    WBC, Urine 11-20 (A) 1-5, NONE /HPF    RBC, Urine 3-5 NONE, 1-2, 3-5 /HPF    Squamous Epithelial Cells, Urine 1-9 (SPARSE) Reference range not established. /HPF   Basic metabolic panel   Result Value Ref Range    Glucose 105 (H) 74 - 99 mg/dL    Sodium 139 136 - 145 mmol/L    Potassium 3.5 3.5 - 5.3 mmol/L    Chloride 106 98 - 107 mmol/L    Bicarbonate 25 21 - 32 mmol/L    Anion Gap 12 10 - 20 mmol/L    Urea Nitrogen 12 6 - 23 mg/dL    Creatinine 0.62 0.50 - 1.05 mg/dL    eGFR >90 >60 mL/min/1.73m*2    Calcium 8.0 (L) 8.6 - 10.3 mg/dL   CBC   Result Value Ref Range    WBC 8.0 4.4 - 11.3 x10*3/uL    nRBC 0.0 0.0 - 0.0 /100 WBCs    RBC 4.00 4.00 - 5.20 x10*6/uL    Hemoglobin  11.9 (L) 12.0 - 16.0 g/dL    Hematocrit 36.3 36.0 - 46.0 %    MCV 91 80 - 100 fL    MCH 29.8 26.0 - 34.0 pg    MCHC 32.8 32.0 - 36.0 g/dL    RDW 14.4 11.5 - 14.5 %    Platelets 200 150 - 450 x10*3/uL     Imaging  CT abdomen pelvis w IV contrast 06/01/2024  Inflammatory wall thickening of the cecum centered upon a diverticulum suspicious for acute diverticulitis. Borderline-mild appendiceal diameter is likely reactive to the aforementioned, as the appendix is not dilated aside from at its base and not fluid filled and no significant inflammation is seen along the remainder of the appendix.    Assessment/Plan   Ms.Gwendolyn BERT Moore is a 79 y.o. female who p/w RLQ pain and is admitted for acute diverticulitis. PMH includes HLD, OA, and insomnia.    Acute Diverticulitis, improving   -NPO, advance to clears as tolerated  -IVF  -IV Cipro + Flagyl  -Supportive care for pain/nausea  -Consider GI consult if no improvement    HLD  -Continue statin    Insomnia  -PRN Trazodone    Other comorbidities as above  -Continue meds as ordered and adjust based on clinical course       GI/VTE PPX: PPI, SQ Lovenox  Code Status: Full Code    Chart, medical history, and labs/testing reviewed in detail.   Case and plan of care to be discussed with attending provider.      Disposition: Discharge home once medically cleared and stable, pending improvement in symptoms    Discussed results, labs, imaging with supervising physician.     Observation/Internal Med ANA PAULA  [facility]  06/02/24  11:35 AM  Total time of 45 minutes spent on professional and overall care, with >50% of time dedicated to counseling/coordination of care.

## 2024-06-02 NOTE — DISCHARGE INSTRUCTIONS
[facility] Observation Unit Discharge Instructions  You came to the hospital with diverticulitis and were admitted for observation and further care.   You were treated with IV antibiotics and IV fluids.    Your discharge plan is to go home to recover with oral antibiotics    Please see your primary care doctor in 1 week for follow-up.   An appointment has been requested for you but may you need to call your doctors office to schedule.    For any issues or concerns with appointments, call the  scheduling line at [phone] or the providers office directly.       See the attached information for education about any new medications and the condition(s) you were treated for.  Your medications may have changed so pay close attention to the list given to you at discharge and ask any questions you have before leaving the hospital.

## 2024-06-02 NOTE — PROGRESS NOTES
ANY spoke with DtrDulce Ku. Pt and Dtr reside together. Pt is independent in ADLs, still drives. Once medically stable, plan would be to return home.

## 2024-06-03 VITALS
HEART RATE: 77 BPM | TEMPERATURE: 97.3 F | BODY MASS INDEX: 30.55 KG/M2 | OXYGEN SATURATION: 98 % | RESPIRATION RATE: 18 BRPM | SYSTOLIC BLOOD PRESSURE: 152 MMHG | HEIGHT: 62 IN | DIASTOLIC BLOOD PRESSURE: 66 MMHG | WEIGHT: 166 LBS

## 2024-06-03 PROBLEM — K57.92 ACUTE DIVERTICULITIS: Status: ACTIVE | Noted: 2024-06-03

## 2024-06-03 LAB
ANION GAP SERPL CALC-SCNC: 15 MMOL/L (ref 10–20)
BACTERIA UR CULT: NORMAL
BUN SERPL-MCNC: 6 MG/DL (ref 6–23)
CALCIUM SERPL-MCNC: 8.6 MG/DL (ref 8.6–10.3)
CHLORIDE SERPL-SCNC: 101 MMOL/L (ref 98–107)
CO2 SERPL-SCNC: 26 MMOL/L (ref 21–32)
CREAT SERPL-MCNC: 0.62 MG/DL (ref 0.5–1.05)
EGFRCR SERPLBLD CKD-EPI 2021: >90 ML/MIN/1.73M*2
ERYTHROCYTE [DISTWIDTH] IN BLOOD BY AUTOMATED COUNT: 13.8 % (ref 11.5–14.5)
GLUCOSE SERPL-MCNC: 112 MG/DL (ref 74–99)
HCT VFR BLD AUTO: 38.7 % (ref 36–46)
HGB BLD-MCNC: 13.1 G/DL (ref 12–16)
MCH RBC QN AUTO: 30.4 PG (ref 26–34)
MCHC RBC AUTO-ENTMCNC: 33.9 G/DL (ref 32–36)
MCV RBC AUTO: 90 FL (ref 80–100)
NRBC BLD-RTO: 0 /100 WBCS (ref 0–0)
PLATELET # BLD AUTO: 248 X10*3/UL (ref 150–450)
POTASSIUM SERPL-SCNC: 3.7 MMOL/L (ref 3.5–5.3)
RBC # BLD AUTO: 4.31 X10*6/UL (ref 4–5.2)
SODIUM SERPL-SCNC: 138 MMOL/L (ref 136–145)
WBC # BLD AUTO: 7.3 X10*3/UL (ref 4.4–11.3)

## 2024-06-03 PROCEDURE — 36415 COLL VENOUS BLD VENIPUNCTURE: CPT | Performed by: NURSE PRACTITIONER

## 2024-06-03 PROCEDURE — 1100000001 HC PRIVATE ROOM DAILY

## 2024-06-03 PROCEDURE — 85027 COMPLETE CBC AUTOMATED: CPT | Performed by: NURSE PRACTITIONER

## 2024-06-03 PROCEDURE — 2500000004 HC RX 250 GENERAL PHARMACY W/ HCPCS (ALT 636 FOR OP/ED): Mod: JZ | Performed by: NURSE PRACTITIONER

## 2024-06-03 PROCEDURE — 2500000004 HC RX 250 GENERAL PHARMACY W/ HCPCS (ALT 636 FOR OP/ED): Performed by: NURSE PRACTITIONER

## 2024-06-03 PROCEDURE — 80048 BASIC METABOLIC PNL TOTAL CA: CPT | Performed by: NURSE PRACTITIONER

## 2024-06-03 PROCEDURE — 96372 THER/PROPH/DIAG INJ SC/IM: CPT | Performed by: NURSE PRACTITIONER

## 2024-06-03 RX ORDER — METRONIDAZOLE 500 MG/1
500 TABLET ORAL 3 TIMES DAILY
Qty: 15 TABLET | Refills: 0 | Status: CANCELLED | OUTPATIENT
Start: 2024-06-03 | End: 2024-06-08

## 2024-06-03 RX ORDER — METRONIDAZOLE 500 MG/1
500 TABLET ORAL 3 TIMES DAILY
Qty: 30 TABLET | Refills: 0 | Status: SHIPPED | OUTPATIENT
Start: 2024-06-03 | End: 2024-06-13

## 2024-06-03 RX ORDER — CIPROFLOXACIN 250 MG/1
500 TABLET, FILM COATED ORAL 2 TIMES DAILY
Qty: 40 TABLET | Refills: 0 | Status: CANCELLED | OUTPATIENT
Start: 2024-06-03 | End: 2024-06-13

## 2024-06-03 RX ORDER — CIPROFLOXACIN 500 MG/1
500 TABLET ORAL 2 TIMES DAILY
Qty: 20 TABLET | Refills: 0 | Status: SHIPPED | OUTPATIENT
Start: 2024-06-03 | End: 2024-06-13

## 2024-06-03 RX ADMIN — METRONIDAZOLE 500 MG: 500 INJECTION, SOLUTION INTRAVENOUS at 05:25

## 2024-06-03 RX ADMIN — METRONIDAZOLE 500 MG: 500 INJECTION, SOLUTION INTRAVENOUS at 12:49

## 2024-06-03 RX ADMIN — ENOXAPARIN SODIUM 40 MG: 40 INJECTION SUBCUTANEOUS at 06:16

## 2024-06-03 RX ADMIN — CIPROFLOXACIN 400 MG: 400 INJECTION, SOLUTION INTRAVENOUS at 10:32

## 2024-06-03 RX ADMIN — ACETAMINOPHEN 650 MG: 325 TABLET ORAL at 05:18

## 2024-06-03 ASSESSMENT — COGNITIVE AND FUNCTIONAL STATUS - GENERAL
DAILY ACTIVITIY SCORE: 24
MOBILITY SCORE: 24

## 2024-06-03 ASSESSMENT — PAIN - FUNCTIONAL ASSESSMENT
PAIN_FUNCTIONAL_ASSESSMENT: 0-10
PAIN_FUNCTIONAL_ASSESSMENT: 0-10

## 2024-06-03 ASSESSMENT — PAIN SCALES - GENERAL
PAINLEVEL_OUTOF10: 0 - NO PAIN
PAINLEVEL_OUTOF10: 0 - NO PAIN

## 2024-06-03 NOTE — PROGRESS NOTES
06/03/24 1332   Current Planned Discharge Disposition   Current Planned Discharge Disposition Home     Met with patient during IDR-patient is going to eat lunch and then see how she feels.  Anticipate discharge home today on PO antibiotics.  Patient said her daughter gets off work at 1630 and will be able to pick her up from the hospital if discharged.  CHAR Mott aware of  plan.

## 2024-06-03 NOTE — DISCHARGE SUMMARY
Discharge Diagnosis  Diverticulitis    Issues Requiring Follow-Up  Diverticulitis     Discharge Meds     Your medication list        START taking these medications        Instructions Last Dose Given Next Dose Due   ciprofloxacin 500 mg tablet  Commonly known as: Cipro      Take 1 tablet (500 mg) by mouth 2 times a day for 10 days.       metroNIDAZOLE 500 mg tablet  Commonly known as: Flagyl      Take 1 tablet (500 mg) by mouth 3 times a day for 10 days.              CONTINUE taking these medications        Instructions Last Dose Given Next Dose Due   atorvastatin 10 mg tablet  Commonly known as: Lipitor      TAKE 1 TABLET BY MOUTH EVERY DAY       clindamycin 300 mg capsule  Commonly known as: Cleocin           fexofenadine 180 mg tablet  Commonly known as: Allegra           ibuprofen 800 mg tablet      Take 1 tablet (800 mg) by mouth every 8 hours if needed for moderate pain (4 - 6).       tiZANidine 4 mg capsule  Commonly known as: Zanaflex      Take 1 capsule (4 mg) by mouth 3 times a day for 7 days.       Tylenol Arthritis Pain 650 mg ER tablet  Generic drug: acetaminophen                  STOP taking these medications      traZODone 50 mg tablet  Commonly known as: Desyrel                  Where to Get Your Medications        These medications were sent to [facility]/pharmacy #[id number] - [place], [place] [address] AT [place] [address], [place] [address]      Phone: [phone]   ciprofloxacin 500 mg tablet  metroNIDAZOLE 500 mg tablet         Test Results Pending At Discharge  Pending Labs       No current pending labs.            Hospital Course  Sharifa Moore is a 79 y.o. female, with a PMH of OA, seasonal allergies, insomnia, and HLD, who presented to [facility] ED on 6/1/2024 for RLQ pain. She initially presented to urgent care and had an unremarkable workup there and was referred to the ED for further workup and imaging. She reports constipation x days, followed by RLQ pain which  started ~ 3 days ago. Reports decreased appetite. Denies red/black stools. Denies nausea, voimiting. Denies fever or chills. She denies any fever, chills, n/v, changes in bowel habits, chest pain, SOB, headaches, or dizziness.    Patient was admitted to OBS for further work up    Patient was found to have acute diverticulitis with CT abdomen pelvis w IV contrast 06/01/2024 showing Inflammatory wall thickening of the cecum centered upon a diverticulum suspicious for acute diverticulitis. Borderline-mild appendiceal diameter is likely reactive to the aforementioned, as the appendix is not dilated aside from at its base and not fluid filled and no significant inflammation is seen along the remainder of the appendix.     She was NPO, and had advanced to clears as tolerated then to solid diet. While in hospital she was on IV cipro + flagyl. Patients abdominal pain improved after IV abx and she began to tolerate diet. She was discharged on PO abx. Patient is to follow up with PCP.     Labs/Testing reviewed    Interdisciplinary team rounding completed with hospitalist, nurse, TCC    PA discussed plan and lab/testing results with Dr. Vasquez prior to discharge.     Pertinent Physical Exam At Time of Discharge  Physical Exam  Constitutional:       Appearance: Normal appearance.   HENT:      Head: Normocephalic and atraumatic.      Nose: Nose normal.   Cardiovascular:      Rate and Rhythm: Normal rate and regular rhythm.   Pulmonary:      Effort: Pulmonary effort is normal. No respiratory distress.      Breath sounds: No wheezing or rales.   Abdominal:      General: Bowel sounds are normal. There is no distension.      Palpations: Abdomen is soft.      Tenderness: There is abdominal tenderness (minimal TTP in RLQ improved from yesterday). There is no guarding.   Musculoskeletal:         General: Normal range of motion.      Cervical back: Normal range of motion.      Right lower leg: No edema.      Left lower leg: No edema.    Skin:     General: Skin is warm and dry.   Neurological:      General: No focal deficit present.      Mental Status: She is alert.   Psychiatric:         Mood and Affect: Mood normal.         Behavior: Behavior normal.         Outpatient Follow-Up  Future Appointments   Date Time Provider Department Center   6/7/2024 11:20 AM DO KEENA [id number] [facility]   6/28/2024 10:20 AM DO KEENA [id number] [facility]         Jenny Ruff PA-C

## 2024-06-03 NOTE — HOSPITAL COURSE
Sharifa Moore is a 79 y.o. female, with a PMH of OA, seasonal allergies, insomnia, and HLD, who presented to [facility] ED on 6/1/2024 for RLQ pain. She initially presented to urgent care and had an unremarkable workup there and was referred to the ED for further workup and imaging. She reports constipation x days, followed by RLQ pain which started ~ 3 days ago. Reports decreased appetite. Denies red/black stools. Denies nausea, voimiting. Denies fever or chills. She denies any fever, chills, n/v, changes in bowel habits, chest pain, SOB, headaches, or dizziness.    Patient was admitted to OBS for further work up    Patient was found to have acute diverticulitis with CT abdomen pelvis w IV contrast 06/01/2024 showing Inflammatory wall thickening of the cecum centered upon a diverticulum suspicious for acute diverticulitis. Borderline-mild appendiceal diameter is likely reactive to the aforementioned, as the appendix is not dilated aside from at its base and not fluid filled and no significant inflammation is seen along the remainder of the appendix.     She was NPO, and had advanced to clears as tolerated then to solid diet. While in hospital she was on IV cipro + flagyl. Patients abdominal pain improved after IV abx and she began to tolerate diet. She was discharged on PO abx. Patient is to follow up with PCP.     Labs/Testing reviewed    Interdisciplinary team rounding completed with hospitalist, nurse, TCC    PA discussed plan and lab/testing results with Dr. Vasquez prior to discharge.

## 2024-06-05 ENCOUNTER — PATIENT OUTREACH (OUTPATIENT)
Dept: PRIMARY CARE | Facility: CLINIC | Age: 79
End: 2024-06-05
Payer: MEDICARE

## 2024-06-05 RX ORDER — POLYETHYLENE GLYCOL 3350 17 G/17G
17 POWDER, FOR SOLUTION ORAL AS NEEDED
COMMUNITY

## 2024-06-05 RX ORDER — SENNOSIDES 8.6 MG/1
1 TABLET ORAL AS NEEDED
COMMUNITY

## 2024-06-05 NOTE — PROGRESS NOTES
Discharge Facility: [facility]  Discharge Diagnosis: Diverticulitis  Admission Date: 6/1/2024  Discharge Date: 6/3/2024    PCP Appointment Date: Message sent to office to schedule  Specialist Appointment Date: 6/7/2024 11:20 Dr. Aldo Guzman, Orthopedics  [facility] Encounter and Summary: Linked  See discharge assessment below for further detail    Medications  Medications reviewed with patient/caregiver?: Yes (Patient) (6/5/2024  9:32 AM)  Is the patient having any side effects they believe may be caused by any medication additions or changes?: No (6/5/2024  9:32 AM)  Does the patient have all medications ordered at discharge?: Yes (6/5/2024  9:32 AM)  Care Management Interventions: No intervention needed (6/5/2024  9:32 AM)  Prescription Comments: NEW:ciprofloxacin and metronidazole (6/5/2024  9:32 AM)  Is the patient taking all medications as directed (includes completed medication regime)?: Yes (6/5/2024  9:32 AM)  Care Management Interventions: Provided patient education (6/5/2024  9:32 AM)  Medication Comments: Verbalized understanding of medication changes. (6/5/2024  9:32 AM)    Appointments  Does the patient have a primary care provider?: Yes (6/5/2024  9:32 AM)  Care Management Interventions: -- (Message sent to office to schedule.) (6/5/2024  9:32 AM)  Has the patient kept scheduled appointments due by today?: Not applicable (6/5/2024  9:32 AM)    Self Management  What is the home health agency?: N/A (6/5/2024  9:32 AM)  What Durable Medical Equipment (DME) was ordered?: N/A (6/5/2024  9:32 AM)    Patient Teaching  Does the patient have access to their discharge instructions?: Yes (6/5/2024  9:32 AM)  Care Management Interventions: Reviewed instructions with patient (6/5/2024  9:32 AM)  What is the patient's perception of their health status since discharge?: Improving (States is no longer having pain in side, but is still uncomfortable from constipation. Had a small BM in the hospital, but none  since. Took sennoside tablet last night and going to take Miralax today.) (6/5/2024  9:32 AM)  Is the patient/caregiver able to teach back the hierarchy of who to call/visit for symptoms/problems? PCP, Specialist, Home Health nurse, Urgent Care, ED, 911: Yes (6/5/2024  9:32 AM)  Patient/Caregiver Education Comments: Patient verbalized understanding of discharge instructions. Provided contact information for nonurgent questions or concerns. (6/5/2024  9:32 AM)    Wrap Up  Wrap Up Additional Comments: 79yoF with PMHx of OA, seasonal allergies, insomnia, and HLD, who presented to [facility] ED on 6/1/2024 for RLQ pain. CT abdomen and pelvis showed acute diverticulitis. Patient treated with IV cipro and metronidazole. Patient discharged to home self care. Rx's for oral cipro and metronidazole given. (6/5/2024  9:32 AM)

## 2024-06-07 ENCOUNTER — HOSPITAL ENCOUNTER (OUTPATIENT)
Dept: RADIOLOGY | Facility: EXTERNAL LOCATION | Age: 79
Discharge: HOME | End: 2024-06-07

## 2024-06-07 ENCOUNTER — OFFICE VISIT (OUTPATIENT)
Dept: ORTHOPEDIC SURGERY | Facility: CLINIC | Age: 79
End: 2024-06-07
Payer: MEDICARE

## 2024-06-07 DIAGNOSIS — M16.12 ARTHRITIS OF LEFT HIP: ICD-10-CM

## 2024-06-09 LAB
ATRIAL RATE: 68 BPM
P AXIS: 54 DEGREES
P OFFSET: 179 MS
P ONSET: 121 MS
PR INTERVAL: 200 MS
Q ONSET: 221 MS
QRS COUNT: 12 BEATS
QRS DURATION: 96 MS
QT INTERVAL: 436 MS
QTC CALCULATION(BAZETT): 463 MS
QTC FREDERICIA: 454 MS
R AXIS: 10 DEGREES
T AXIS: 52 DEGREES
T OFFSET: 439 MS
VENTRICULAR RATE: 68 BPM

## 2024-06-14 ENCOUNTER — PATIENT OUTREACH (OUTPATIENT)
Dept: PRIMARY CARE | Facility: CLINIC | Age: 79
End: 2024-06-14
Payer: MEDICARE

## 2024-06-14 NOTE — PROGRESS NOTES
Call regarding follow up after hospitalization. At time of outreach call, the patient states she feels well, just tired. She is finishing her last dose of antibiotics tomorrow. Assisted patient to schedule a hospital follow up with Dr. Quiles.

## 2024-06-18 ENCOUNTER — APPOINTMENT (OUTPATIENT)
Dept: PRIMARY CARE | Facility: CLINIC | Age: 79
End: 2024-06-18
Payer: MEDICARE

## 2024-06-18 VITALS
WEIGHT: 167 LBS | HEART RATE: 86 BPM | DIASTOLIC BLOOD PRESSURE: 73 MMHG | SYSTOLIC BLOOD PRESSURE: 148 MMHG | BODY MASS INDEX: 30.54 KG/M2

## 2024-06-18 DIAGNOSIS — K57.92 ACUTE DIVERTICULITIS: ICD-10-CM

## 2024-06-18 DIAGNOSIS — K57.92 DIVERTICULITIS: Primary | ICD-10-CM

## 2024-06-18 DIAGNOSIS — M19.90 ARTHRITIS: ICD-10-CM

## 2024-06-18 DIAGNOSIS — I10 PRIMARY HYPERTENSION: ICD-10-CM

## 2024-06-18 PROCEDURE — 99214 OFFICE O/P EST MOD 30 MIN: CPT | Performed by: INTERNAL MEDICINE

## 2024-06-18 PROCEDURE — 3077F SYST BP >= 140 MM HG: CPT | Performed by: INTERNAL MEDICINE

## 2024-06-18 PROCEDURE — 1111F DSCHRG MED/CURRENT MED MERGE: CPT | Performed by: INTERNAL MEDICINE

## 2024-06-18 PROCEDURE — 1160F RVW MEDS BY RX/DR IN RCRD: CPT | Performed by: INTERNAL MEDICINE

## 2024-06-18 PROCEDURE — 1159F MED LIST DOCD IN RCRD: CPT | Performed by: INTERNAL MEDICINE

## 2024-06-18 PROCEDURE — 1036F TOBACCO NON-USER: CPT | Performed by: INTERNAL MEDICINE

## 2024-06-18 PROCEDURE — 3078F DIAST BP <80 MM HG: CPT | Performed by: INTERNAL MEDICINE

## 2024-06-19 ENCOUNTER — APPOINTMENT (OUTPATIENT)
Dept: ORTHOPEDIC SURGERY | Facility: CLINIC | Age: 79
End: 2024-06-19
Payer: MEDICARE

## 2024-06-19 ENCOUNTER — HOSPITAL ENCOUNTER (OUTPATIENT)
Dept: RADIOLOGY | Facility: EXTERNAL LOCATION | Age: 79
Discharge: HOME | End: 2024-06-19

## 2024-06-19 DIAGNOSIS — M16.12 ARTHRITIS OF LEFT HIP: ICD-10-CM

## 2024-06-19 PROCEDURE — 20611 DRAIN/INJ JOINT/BURSA W/US: CPT | Performed by: FAMILY MEDICINE

## 2024-06-19 PROCEDURE — 1111F DSCHRG MED/CURRENT MED MERGE: CPT | Performed by: FAMILY MEDICINE

## 2024-06-19 PROCEDURE — 99214 OFFICE O/P EST MOD 30 MIN: CPT | Performed by: FAMILY MEDICINE

## 2024-06-19 RX ORDER — TRIAMCINOLONE ACETONIDE 40 MG/ML
80 INJECTION, SUSPENSION INTRA-ARTICULAR; INTRAMUSCULAR
Status: COMPLETED | OUTPATIENT
Start: 2024-06-19 | End: 2024-06-19

## 2024-06-19 RX ORDER — LIDOCAINE HYDROCHLORIDE 10 MG/ML
4 INJECTION INFILTRATION; PERINEURAL
Status: COMPLETED | OUTPATIENT
Start: 2024-06-19 | End: 2024-06-19

## 2024-06-19 NOTE — PROGRESS NOTES
** Please excuse any errors in grammar or translation related to this dictation. Voice recognition software was utilized to prepare this document. **    Assessment & Plan:  Patient following up for ongoing management of advanced left hip arthritis with progression of symptoms.  Unfortunately last steroid injection had limited effect. We discussed option for referral to hip replacement surgeon however patient is not interested at this time.  She was given option to have intra-articular steroid injection completed today which she agreed to have performed.  See below.  Informed patient this can be performed every 3 or more months as symptoms dictate.   Patient will follow-up with Dr. Guzman as planned.  I am happy to see the patient back if Dr. Guzman is unavailable.  All questions answered and patient agrees with this plan.    Chief complaint:  Left hip pain  HPI:  80 y/o patient, hx of HTN, presents with left hip pain.  Hip pain has been ongoing for several years.  She has been under the care of Dr. Guzman for this condition has been receiving steroid injections every 3 to 4 months.  Most recent injection was completed in March 2024.  Unfortunately shortly after that injection was completed she aggravated her hip symptoms when she caught a fellow [employer] member who had a syncopal event which caused her hip to be compressed.  Since that time has had constant pain within the groin.      Patient Active Problem List   Diagnosis    Achilles tendinitis of right lower extremity    Arthritis    Dermatitis    Eczema    Shoulder arthritis    Chronic eczematous otitis externa of right ear    GERD without esophagitis    High magnesium levels    Insomnia    Mixed hyperlipidemia    Osteoporosis    Tear of medial meniscus of right knee    Age-related nuclear cataract of both eyes    Asymmetrical sensorineural hearing loss    Arthritis of knee    Generalized osteoarthrosis of hand    Hypertension    Primary osteoarthritis of ankle     Elevated fasting glucose    Diverticulitis    Acute diverticulitis     Past Surgical History:   Procedure Laterality Date     SECTION, CLASSIC  2014     Section     SECTION, LOW TRANSVERSE      COLONOSCOPY  2014    Complete Colonoscopy    FOOT SURGERY Left     TOTAL SHOULDER ARTHROPLASTY Right     WISDOM TOOTH EXTRACTION       Current Outpatient Medications on File Prior to Visit   Medication Sig Dispense Refill    acetaminophen (Tylenol Arthritis Pain) 650 mg ER tablet Take by mouth every 8 hours if needed.      atorvastatin (Lipitor) 10 mg tablet TAKE 1 TABLET BY MOUTH EVERY DAY 90 tablet 3    [] ciprofloxacin (Cipro) 500 mg tablet Take 1 tablet (500 mg) by mouth 2 times a day for 10 days. 20 tablet 0    clindamycin (Cleocin) 300 mg capsule TAKE 2 CAPSULES BY MOUTH ONE HOUR PRIOR TO APPT      fexofenadine (Allegra) 180 mg tablet Take 1 tablet (180 mg) by mouth once daily as needed (allergies).      ibuprofen 800 mg tablet Take 1 tablet (800 mg) by mouth every 8 hours if needed for moderate pain (4 - 6). 40 tablet 1    [] metroNIDAZOLE (Flagyl) 500 mg tablet Take 1 tablet (500 mg) by mouth 3 times a day for 10 days. 30 tablet 0    polyethylene glycol (Glycolax, Miralax) 17 gram packet Take 17 g by mouth if needed.      sennosides (Senokot) 8.6 mg tablet Take 1 tablet (8.6 mg) by mouth if needed for constipation.      tiZANidine (Zanaflex) 4 mg capsule Take 1 capsule (4 mg) by mouth 3 times a day for 7 days. (Patient not taking: Reported on 2024) 21 capsule 0     No current facility-administered medications on file prior to visit.         Exam:  LEFT Hip Exam:  [Normal gait]  No warmth, erythema or ecchymosis overlying.  Active flexion >90 degrees. Limited IR, ER.   NTTP over greater trochanter, glute tendons  [5]/5 strength of hip flexion, abduction, & adduction  SILT  [ - ]Log roll pain, [ + ]FADIR pain, [ - ]NOEMY pain, [ + ]Stinchfield    General  Exam:  Constitutional - NAD, AAO x 3, conversing appropriately.  HEENT- Normocephalic and atraumatic. EOMI, PERRLA, No scleral icterus. No facial deformities. Hearing grossly normal.  Lungs - Breathing non-labored with normal rate. No accessory muscle use.  CV - Extremities warm and well-perfused, brisk capillary refill present.   Neuro - CN II-XII grossly intact.      Results:  X-rays left hip from April 2022 personally interpreted advanced hip joint degenerative changes.  Reviewed previous encounter with Dr. Guzman was left hip from March 8, 2024.    Lab Results   Component Value Date    CREATININE 0.62 06/03/2024    EGFR >90 06/03/2024          Procedure:  Patient ID: Sharifa Moore is a 79 y.o. female.    L Inj/Asp: L hip joint on 6/19/2024 2:29 PM  Indications: pain  Details: 22 G needle, ultrasound-guided anterior approach  Medications: 80 mg triamcinolone acetonide 40 mg/mL; 4 mL lidocaine 10 mg/mL (1 %)  Outcome: tolerated well, no immediate complications    Procedure risk factors to include increased pain, bleeding, infection, neurovascular injury, soft tissue injury, transient elevation of blood glucose and blood pressure, and adverse reaction to medication were discussed with the patient. Patient understands there is a moderate risk of morbidity from undergoing the procedure.    Procedure, treatment alternatives, risks and benefits explained, specific risks discussed. Consent was given by the patient. Immediately prior to procedure a time out was called to verify the correct patient, procedure, equipment, support staff and site/side marked as required. Patient was prepped and draped in the usual sterile fashion.

## 2024-06-20 RX ORDER — FLUOCINOLONE ACETONIDE 0.11 MG/ML
OIL AURICULAR (OTIC)
COMMUNITY
Start: 2024-06-12

## 2024-06-20 ASSESSMENT — ENCOUNTER SYMPTOMS
VOMITING: 0
WHEEZING: 0
ABDOMINAL DISTENTION: 0
CONSTIPATION: 0
ACTIVITY CHANGE: 0
CHILLS: 0
MYALGIAS: 0
DIARRHEA: 0
COUGH: 0
LIGHT-HEADEDNESS: 0
HEADACHES: 0
NAUSEA: 0
FEVER: 0
DIZZINESS: 0
PALPITATIONS: 0
CHEST TIGHTNESS: 0
ABDOMINAL PAIN: 0
ARTHRALGIAS: 1
SHORTNESS OF BREATH: 0

## 2024-06-21 NOTE — PATIENT INSTRUCTIONS
I am glad to hear that you are recovering well from your recent hospitalization.  If you have any questions or recurrent/worsening symptoms, please feel free to contact us.  Please keep close follow-up with your specialists and if all remains well, we will plan on seeing you back as previously scheduled for your routine wellness visit.

## 2024-06-28 ENCOUNTER — APPOINTMENT (OUTPATIENT)
Dept: ORTHOPEDIC SURGERY | Facility: CLINIC | Age: 79
End: 2024-06-28
Payer: MEDICARE

## 2024-06-28 ENCOUNTER — HOSPITAL ENCOUNTER (OUTPATIENT)
Dept: RADIOLOGY | Facility: EXTERNAL LOCATION | Age: 79
Discharge: HOME | End: 2024-06-28

## 2024-06-28 DIAGNOSIS — M17.11 ARTHRITIS OF RIGHT KNEE: Primary | ICD-10-CM

## 2024-06-28 PROCEDURE — 99214 OFFICE O/P EST MOD 30 MIN: CPT | Performed by: EMERGENCY MEDICINE

## 2024-06-28 PROCEDURE — 20611 DRAIN/INJ JOINT/BURSA W/US: CPT | Performed by: EMERGENCY MEDICINE

## 2024-06-28 PROCEDURE — 1111F DSCHRG MED/CURRENT MED MERGE: CPT | Performed by: EMERGENCY MEDICINE

## 2024-06-28 RX ORDER — TRIAMCINOLONE ACETONIDE 40 MG/ML
80 INJECTION, SUSPENSION INTRA-ARTICULAR; INTRAMUSCULAR
Status: COMPLETED | OUTPATIENT
Start: 2024-06-28 | End: 2024-06-28

## 2024-06-28 RX ORDER — LIDOCAINE HYDROCHLORIDE 10 MG/ML
4 INJECTION INFILTRATION; PERINEURAL
Status: COMPLETED | OUTPATIENT
Start: 2024-06-28 | End: 2024-06-28

## 2024-06-28 ASSESSMENT — ENCOUNTER SYMPTOMS: KNEE SWELLING: 1

## 2024-06-28 NOTE — PROGRESS NOTES
Subjective   Sharifa Moore is a 79 y.o. female who presents for Follow-up of the Right Knee    Right Knee       6/28/24: Patient returns today for right knee pain.  We did perform a right knee corticosteroid injection for her on 3/22/2024.  She felt that it worked quite well.  Her pain is since returned and become progressively worse.  Consider this, she would like to repeat injection performed today.  No additional complaints.    3/22/24: Patient returns today for right knee pain.  We did perform a right knee corticosteroid injection for her on 12/22/2023.  She felt that it were quite well and would like to have a repeat injection form today.  No additional complaints.    12/22/23: Patient returns today for right knee pain.  We performed a right knee corticosteroid injection on 7/28/2023.  She felt that this worked quite well for her and would like to have a repeat injection performed today.  No additional complaints.    7/28/23: Patient returns today for right knee pain. She states the previous injection she received on 5/5/2023 worked quite well up until recently. She presents today requesting a repeat injection. She denies any further injuries.    5/5/23: Patient returns today for right knee pain. She states the previous injection she received on 1/13/2023 worked quite well for her up until recently. She would like to have a repeat injection today. She denies any further injuries. She has no additional complaints at this time.    1/13/23: Patient returns today for reevaluation for right knee pain. She states that the previous injection she received on 3/21/2022 did provide significant relief up until recently. She presents today requesting repeat injection. She denies any recent injuries.    3/21/22: Sharifa is a very pleasant 77-year-old female here today for evaluation of chronic right knee pain. She states that approximately 6 months ago her knee pain insidiously began to worsen without any acute  injury or trauma. Referral by Dr Fernando. She does have a history of partial tear of her right Achilles 4 years ago however, she has no other history of right lower extremity pain. She has no surgical history for this knee. She describes an achy pain about her right anterior and lateral aspect of her knee that is worse with standing for too long, kneeling, bending, stairs, standing from a seated position and she does feel unstable at times. Pain rated as moderate in intensity, not associated with any swelling or erythema. She has tried naproxen 1 tab about 2-3 times a week, Tylenol arthritis and [employer] green herbal cream for pain relief. She denies any recent injury. She would like to discuss further treatment options today. No other complaints today.       ROS: All pertinent positive symptoms are included in the history of present illness.    All other systems have been reviewed and are negative and noncontributory to this patient's current ailments.    Objective     There were no vitals filed for this visit.    Physical Exam  ysical Exam  General/Constitutional: No apparent distress. Well-nourished and well developed.  Head: Normocephalic, Atraumatic.   Eyes: EOMI.  Vascular: No edema, swelling or tenderness, except as noted in detailed exam.  Respiratory: Non-labored breathing.  Integumentary: No impressive skin lesions present, except as noted in detailed exam.  Neurological: Oriented to person, place, and time.  Psychological: Normal mood and affect.  Musculoskeletal: Normal, except as noted in detailed exam     The right knee is without obvious signs of acute bony deformity, swelling, erythema, ecchymosis or joint effusion. The patella is without tenderness. Apprehension is negative with medial and lateral glide. Patella crepitus is negative. Patella grind is positive. The medial joint line is nontender and without bony crepitus or step-off. The lateral joint line is TTP and without bony crepitus or step-off.  Flexion & extension are full and symmetrical. Varus & valgus stress test at 0° and 30° of flexion, Lachman's, Bj's, Apley's, dial test at 90° and 30° of flexion, and posterior drawer are all negative. The opposite knee is nontender and stable. Gait is pain-free and tandem.    Patient ID: Sharifa Moore is a 79 y.o. female.    L Inj/Asp: R knee on 6/28/2024 11:12 AM  Indications: pain  Details: 25 G needle, ultrasound-guided superolateral approach  Medications: 80 mg triamcinolone acetonide 40 mg/mL; 4 mL lidocaine 10 mg/mL (1 %)  Outcome: tolerated well, no immediate complications  Procedure, treatment alternatives, risks and benefits explained, specific risks discussed. Consent was given by the patient. Immediately prior to procedure a time out was called to verify the correct patient, procedure, equipment, support staff and site/side marked as required. Patient was prepped and draped in the usual sterile fashion.             Assessment/Plan   Problem List Items Addressed This Visit    None  Visit Diagnoses       Arthritis of right knee        Relevant Orders    Point of Care Ultrasound (Completed)          Considering that she did well with previous injections, I feel that a repeat injection today is warranted. Discussed risks versus benefits of having injection performed. Patient has elected to proceed with procedure. Please refer to procedure note above. Discussed with patient to limit weightbearing activities over the next 24-48 hours, they can then progress to full activities as tolerated. Discussed with patient to avoid water submersion over the next 2 days. Discussed with patient to call me immediately if they develop worsening pain, rash, erythema, or fevers. Patient should follow-up as needed if pain persists or worsens.    Param Guzman,   Sports Medicine  [facility]     ** Please excuse any errors in grammar or translation related to this dictation. Voice recognition software was  utilized to prepare this document. **

## 2024-07-01 NOTE — ED PROVIDER NOTES
HPI   Chief Complaint   Patient presents with    Abdominal Pain       HPI: []  79-year-old female with history of dyslipidemia, sleep apnea comes in with abdominal pain.  Pain localized right lower abdomen.,  Has associated nausea vomiting diarrhea constipation no fever no chills with the urgent care sent to the ED for evaluation.  Denies any urine frequency urgency hematuria no recent travel or hospitalization.  Denies chest pain pressure heaviness cough congestion.    Past history: Dyslipidemia, sleep apnea  Social history: Patient denies current tobacco alcohol drug abuse.  REVIEW OF SYSTEMS:    GENERAL.: No weight loss, fatigue, anorexia, insomnia, fever.    EYES: No vision loss, double vision, drainage, eye pain.    ENT: No pharyngitis, dry mouth.    CARDIOPULMONARY: No chest pain, palpitations, syncope, near syncope. No shortness of breath, cough, hemoptysis.    GI: Positive for abdominal pain, change in bowel habits, melena, hematemesis, hematochezia, nausea, vomiting, diarrhea.    : No discharge, dysuria, frequency, urgency, hematuria.    MS: No limb pain, joint pain, joint swelling.    SKIN: No rashes.    PSYCH: No depression, anxiety, suicidality, homicidality.    Review of systems is otherwise negative unless stated above or in history of present illness.  Social history, family history, allergies reviewed.  PHYSICAL EXAM:    GENERAL: Vitals noted, no distress. Alert and oriented  x 3. Non-toxic.      EENT: TMs clear. Posterior oropharynx unremarkable. No meningismus. No LAD.     NECK: Supple. Nontender. No midline tenderness.     CARDIAC: Regular, rate, rhythm. No murmurs rubs or gallops. No JVD    PULMONARY: Lungs clear bilaterally with good aeration. No wheezes rales or rhonchi. No respiratory distress.     ABDOMEN: Soft, nonsurgical.  Tender right lower quadrant and lower abdomen no rebound or guarding negative CVA tenderness.  No peritoneal signs. Normoactive bowel sounds. No pulsatile masses.      EXTREMITIES: No peripheral edema. Negative Homans bilaterally, no cords.  2+ bounding pulses well-perfused.    SKIN: No rash. Intact.     NEURO: No focal neurologic deficits, NIH score of 0. Cranial nerves normal as tested from II through XII.     MEDICAL DECISION MAKING:  EKG on my interpretation shows normal sinus rhythm normal axis rate mid 70s with no ischemic changes.  CBC with shows no leukocytosis chemistry LFTs are normal lactate normal abdominal CAT scan confirms acute diverticulitis with some surrounding inflammatory change around the appendix but no obvious appendicitis.    Treatment injury: IV established given intravenous ketorolac, Cipro and Flagyl.  And IV fluids.    ED course: Patient remained stable hemodynamic.    Impression: Acute diverticulitis    Plan set MDM: 79 female comes in with acute abdominal pain workup is concerning acute diverticulitis low concern for acute peritonitis and perforation and or appendicitis patient will be hospitalized for IV to biotics and further care.                          No data recorded                   Patient History   Past Medical History:   Diagnosis Date    Allergic     Anemia 2024    Arthritis     Chronic vaginitis 2023    GI bleed 2018    History of arthroplasty of right shoulder 2024    Pain in left hip 2022    Left hip pain    Personal history of urinary (tract) infections 2014    Personal history of urinary tract infection    Scoliosis     Subacute and chronic vaginitis 2015    Chronic vaginitis    Tear of medial meniscus of knee 2023    Vertigo, benign positional 2023     Past Surgical History:   Procedure Laterality Date     SECTION, CLASSIC  2014     Section     SECTION, LOW TRANSVERSE      COLONOSCOPY  2014    Complete Colonoscopy    FOOT SURGERY Left     TOTAL SHOULDER ARTHROPLASTY Right     WISDOM TOOTH EXTRACTION       Family History   Problem Relation  Name Age of Onset    Osteoporosis Mother Anne-Marie Conley     Stroke Mother Anne-Marie Conley     Colon cancer Father Haris Conley     Hypertension Father Haris MONICA Jarvis     Migraines Father Haris MONICA Jarvis     Prostate cancer Father Haris ANDERSON Jarvis     Arthritis Father Haris ANDERSON Jarvis     Kidney disease Father Haris Conley     Asthma Daughter Elmira Moore     Diabetes Daughter Elmira Moore     Breast cancer Maternal Grandmother Alena Thomas     Cancer Maternal Grandmother Alena Thomas     Hypertension Other      Cancer Other       Social History     Tobacco Use    Smoking status: Never    Smokeless tobacco: Never   Substance Use Topics    Alcohol use: Never    Drug use: Never       Physical Exam   ED Triage Vitals   Temp Heart Rate Resp BP   06/01/24 1708 06/01/24 1708 06/01/24 1708 06/01/24 1708   36.2 °C (97.2 °F) 84 15 98/54      SpO2 Temp Source Heart Rate Source Patient Position   06/01/24 1708 06/01/24 2308 06/01/24 2155 06/01/24 2308   98 % Temporal Monitor Sitting      BP Location FiO2 (%)     06/01/24 2308 --     Left arm        Physical Exam    ED Course & MDM   Diagnoses as of 07/01/24 0331   Acute diverticulitis       Medical Decision Making      Procedure  Procedures     Steph Ortega MD  07/01/24 0337

## 2024-07-03 ENCOUNTER — PATIENT OUTREACH (OUTPATIENT)
Dept: PRIMARY CARE | Facility: CLINIC | Age: 79
End: 2024-07-03
Payer: MEDICARE

## 2024-07-03 NOTE — PROGRESS NOTES
Outreach made for monthly post discharge follow up. At the time of outreach call the patient stated she is feeling well other than being fatigued. Reviewed lab results with patient. Patient's Hgb was 13.1 on 6/3/2024. Provided reassurance and encouragement to patient.

## 2024-07-22 DIAGNOSIS — E78.2 MIXED HYPERLIPIDEMIA: ICD-10-CM

## 2024-07-27 RX ORDER — ATORVASTATIN CALCIUM 10 MG/1
10 TABLET, FILM COATED ORAL DAILY
Qty: 90 TABLET | Refills: 1 | Status: SHIPPED | OUTPATIENT
Start: 2024-07-27 | End: 2025-01-23

## 2024-08-30 ENCOUNTER — APPOINTMENT (OUTPATIENT)
Dept: ORTHOPEDIC SURGERY | Facility: CLINIC | Age: 79
End: 2024-08-30
Payer: MEDICARE

## 2024-08-30 ENCOUNTER — PATIENT OUTREACH (OUTPATIENT)
Dept: PRIMARY CARE | Facility: CLINIC | Age: 79
End: 2024-08-30

## 2024-08-30 DIAGNOSIS — M19.071 PRIMARY OSTEOARTHRITIS OF RIGHT ANKLE: ICD-10-CM

## 2024-08-30 DIAGNOSIS — M76.61 ACHILLES TENDINITIS OF RIGHT LOWER EXTREMITY: ICD-10-CM

## 2024-08-30 RX ORDER — IBUPROFEN 800 MG/1
800 TABLET ORAL EVERY 8 HOURS PRN
Qty: 40 TABLET | Refills: 1 | Status: SHIPPED | OUTPATIENT
Start: 2024-08-30

## 2024-08-30 NOTE — PROGRESS NOTES
Outreach made to wrap up Transitional Care Management (TCM) program. At the time of call, the patient states she is doing very well and feels fully recovered from hospitalization. Patient requesting refill on ibuprofen 800 mg. Request sent to Dr. Quiles. The patient has met target of no readmission for (90) days post hospital discharge and is graduated from the TCM program at this time.

## 2024-09-13 ENCOUNTER — APPOINTMENT (OUTPATIENT)
Dept: ORTHOPEDIC SURGERY | Facility: CLINIC | Age: 79
End: 2024-09-13
Payer: MEDICARE

## 2024-09-13 ENCOUNTER — HOSPITAL ENCOUNTER (OUTPATIENT)
Dept: RADIOLOGY | Facility: EXTERNAL LOCATION | Age: 79
Discharge: HOME | End: 2024-09-13

## 2024-09-13 DIAGNOSIS — M25.552 LEFT HIP PAIN: ICD-10-CM

## 2024-09-13 DIAGNOSIS — M16.12 OSTEOARTHRITIS OF LEFT HIP, UNSPECIFIED OSTEOARTHRITIS TYPE: Primary | ICD-10-CM

## 2024-09-13 PROCEDURE — 99214 OFFICE O/P EST MOD 30 MIN: CPT | Performed by: EMERGENCY MEDICINE

## 2024-09-13 PROCEDURE — 20611 DRAIN/INJ JOINT/BURSA W/US: CPT | Performed by: EMERGENCY MEDICINE

## 2024-09-13 RX ORDER — TRIAMCINOLONE ACETONIDE 40 MG/ML
80 INJECTION, SUSPENSION INTRA-ARTICULAR; INTRAMUSCULAR
Status: COMPLETED | OUTPATIENT
Start: 2024-09-13 | End: 2024-09-13

## 2024-09-13 RX ORDER — LIDOCAINE HYDROCHLORIDE 10 MG/ML
4 INJECTION INFILTRATION; PERINEURAL
Status: COMPLETED | OUTPATIENT
Start: 2024-09-13 | End: 2024-09-13

## 2024-09-13 NOTE — PROGRESS NOTES
Subjective   Sharifa Moore is a 79 y.o. female who presents for Follow-up and Pain of the Left Hip    HPI    9/13/24: Patient returns today for left hip pain.  We did perform a left hip intra-articular injection for her on 3/8/2024.  Since that time, she did have a repeat intra-articular injection with Dr. Desouza on 6/19/2024.  Unfortunately, she did not have drastic relief from that injection.  That said, she would like to have a repeat injection performed today to see if she is able to obtain relief.    3/8/24: Patient returns today for left hip pain.  We did perform a left hip intra-articular injection for her on 12/15/2023.  She felt that it worked quite well up until 3 weeks ago.  She like to have a repeat injection performed today.  However additionally, she would like to discuss the option of total joint replacement.  She feels that these injections are beginning to wean in response and would like to discuss the neck step.    12/15/23: Patient returns today for left hip pain.  We did perform a left hip intra-articular injection for her on 9/18/2023.  She felt this worked quite well for her and would like to have a repeat injection performed today.  No additional complaints.    9/18/23: Patient returns today for left hip pain. We performed a left hip intra-articular injection on 6/30/2023. She felt this worked well up until recently. She would like to have a repeat hip intra-articular injection today. She denies any further injuries. She has no additional complaints at this time.     6/30/23: Patient returns today for left hip pain. She states the previous injection she received on 3/31/2023 worked quite well for her up until recently. She presents today requesting repeat injection. She denies further injuries. She has no additional complaints today.    3/31/23: Patient returns today for left hip pain. She states the previous injection she received on 12/19/2022 worked quite well for her. She presents today  requesting repeat injection versus further treatment options. She denies any further injuries. She has no additional complaints at this time.    12/19/22: Patient returns today for reevaluation for left hip pain. She states the preinjection she received on 9/8/2022 worked quite well for her up until recently. She presents today requesting repeat injection. She denies any further injuries. She has no additional complaints at this time.    9/8/22: Patient returns today for reevaluation for left hip and right ankle pain. In regards to her hip pain, she feels that physical therapy has not helped significantly. She continues to have pain that she localizes deep in her groin with radiation to the lateral aspect of her hip. This pain is worse with hip flexion and has progressed to pain with weightbearing activities. Regards to her ankle pain, she no longer localizes her pain along the Achilles tendon, however she does localize it to the deep and anterior aspect of her ankle. She states that this is associated with swelling that becomes progressively worse throughout weightbearing activities. She would like to discuss further treatment options.    5/9/22: Sharifa is a very pleasant 77-year-old female who presented today 5/9/2022 for evaluation of left hip pain. She is known to me from previous visits for right knee arthritis. Today, she states that she has had about 2.5 months of left hip pain that started after a session on a stationary bike. She states that she was riding for approximately 10 minutes before feeling anterior left hip pain. She stopped riding and felt a constant soreness/achy type sensation rated 6/10 intensity. She was seen by her primary care doctor who obtained a radiograph of the left hip and recommended Tylenol for pain control. She states that the pain is not responding to rest alone but does temporarily improve with Tylenol. Pain is worse with movement and exercise, better with Tylenol and rest. At  the worst she rates the pain 9/10 intensity anterior left hip. She has never had issues with her hip prior to this. She denies any paresthesias or back pain. She denies any skin changes, erythema, redness, bruising or swelling. She denies any other complaints today. Subsequently, she is complaining of right posterior ankle pain. I did evaluate this in the past when she had generalized ankle pain and stiffness consistent with degenerative arthritis. However, she states that she has developed worsening pain on the posterior aspect that she localizes along the mid substance of the Achilles tendon. She states that this has become progressively worse of the past 2 months as well. She denies acute trauma.       ROS: All pertinent positive symptoms are included in the history of present illness.    All other systems have been reviewed and are negative and noncontributory to this patient's current ailments.    Objective     There were no vitals filed for this visit.    Physical Exam  General/Constitutional: No apparent distress. Well-nourished and well developed.  Head: Normocephalic, Atraumatic.   Eyes: EOMI.  Vascular: No edema, swelling or tenderness, except as noted in detailed exam.  Respiratory: Non-labored breathing.  Integumentary: No impressive skin lesions present, except as noted in detailed exam.  Neurological: Oriented to person, place, and time.  Psychological: Normal mood and affect.  Musculoskeletal: Normal, except as noted in detailed exam   The left hip and pelvis are without obvious signs of acute bony deformity, swelling, erythema, ecchymosis or instability. There is tenderness to palpation over the rectus femoris and psoas over the left anterior hip region. Active straight leg raise versus gravity reproduces her anterior hip pain, although she is able to hold the leg in extension with assistance. Seated hip flexion versus resistance also produces pain. Active and passive range of motion are full and  pain-free. Logroll is negative.. Straight leg raise test is negative for pain or radicular symptoms. Thaddeus is negative. Crossover is negative. Hip strength is weak as compared to the opposite hip. The opposite hip is otherwise nontender and stable. Gait is antalgic and tandem.       Patient ID: Sharifa Moore is a 79 y.o. female.    L Inj/Asp: L hip joint on 9/13/2024 11:08 AM  Indications: pain  Details: 20 G needle, ultrasound-guided anterior approach  Medications: 80 mg triamcinolone acetonide 40 mg/mL; 4 mL lidocaine 10 mg/mL (1 %)  Outcome: tolerated well, no immediate complications  Procedure, treatment alternatives, risks and benefits explained, specific risks discussed. Consent was given by the patient. Immediately prior to procedure a time out was called to verify the correct patient, procedure, equipment, support staff and site/side marked as required. Patient was prepped and draped in the usual sterile fashion.           Assessment/Plan   Problem List Items Addressed This Visit    None  Visit Diagnoses       Osteoarthritis of left hip, unspecified osteoarthritis type    -  Primary    Relevant Orders    Referral to Orthopaedic Surgery    Left hip pain        Relevant Orders    Point of Care Ultrasound (Completed)          Unfortunately, she did not have long-lasting relief from the previous injection.  However, I do feel that is reasonable to try a repeat injection today.  I did also discuss with patient that we may need to also consider total joint replacement in the near future should she not have drastic relief.  She is in agreement.  Discussed risks versus benefits of having injection performed. Patient has elected to proceed with procedure. Please refer to procedure note above. Discussed with patient to limit weightbearing activities over the next 24-48 hours, they can then progress to full activities as tolerated. Discussed with patient to avoid water submersion over the next 2 days. Discussed  with patient to call me immediately if they develop worsening pain, rash, erythema, or fevers.  I have placed a referral for total joint replacement for her.  I am more than happy to continue injection therapy for her as long as she would like.  She is aware that she cannot have a total hip replacement within 3 months of an injection.    Param Guzman, DO  Sports Medicine  [facility], [place]     ** Please excuse any errors in grammar or translation related to this dictation. Voice recognition software was utilized to prepare this document. **

## 2024-09-20 ENCOUNTER — APPOINTMENT (OUTPATIENT)
Dept: ORTHOPEDIC SURGERY | Facility: CLINIC | Age: 79
End: 2024-09-20
Payer: MEDICARE

## 2024-09-20 ENCOUNTER — HOSPITAL ENCOUNTER (OUTPATIENT)
Dept: RADIOLOGY | Facility: EXTERNAL LOCATION | Age: 79
Discharge: HOME | End: 2024-09-20

## 2024-09-20 DIAGNOSIS — M17.11 ARTHRITIS OF RIGHT KNEE: Primary | ICD-10-CM

## 2024-09-20 PROCEDURE — 20611 DRAIN/INJ JOINT/BURSA W/US: CPT | Performed by: EMERGENCY MEDICINE

## 2024-09-20 PROCEDURE — 99214 OFFICE O/P EST MOD 30 MIN: CPT | Performed by: EMERGENCY MEDICINE

## 2024-09-20 RX ORDER — TRIAMCINOLONE ACETONIDE 40 MG/ML
80 INJECTION, SUSPENSION INTRA-ARTICULAR; INTRAMUSCULAR
Status: COMPLETED | OUTPATIENT
Start: 2024-09-20 | End: 2024-09-20

## 2024-09-20 RX ORDER — LIDOCAINE HYDROCHLORIDE 10 MG/ML
4 INJECTION, SOLUTION INFILTRATION; PERINEURAL
Status: COMPLETED | OUTPATIENT
Start: 2024-09-20 | End: 2024-09-20

## 2024-09-20 ASSESSMENT — ENCOUNTER SYMPTOMS: KNEE SWELLING: 1

## 2024-09-20 NOTE — PROGRESS NOTES
Subjective   Sharifa Moore is a 79 y.o. female who presents for Follow-up of the Right Knee (DOLI: 6/28/24/)    Right Knee       9/20/24: Patient returns today for right knee pain.  We did perform a right knee corticosteroid injection for her on 6/28/2024.  She felt that it worked quite well for her up until recently.  Her pain has since returned and become progressively worse.  Considering this, she would like to have a repeat right knee corticosteroid injection today.  No additional complaints.     6/28/24: Patient returns today for right knee pain.  We did perform a right knee corticosteroid injection for her on 3/22/2024.  She felt that it worked quite well.  Her pain is since returned and become progressively worse.  Consider this, she would like to repeat injection performed today.  No additional complaints.    3/22/24: Patient returns today for right knee pain.  We did perform a right knee corticosteroid injection for her on 12/22/2023.  She felt that it were quite well and would like to have a repeat injection form today.  No additional complaints.    12/22/23: Patient returns today for right knee pain.  We performed a right knee corticosteroid injection on 7/28/2023.  She felt that this worked quite well for her and would like to have a repeat injection performed today.  No additional complaints.    7/28/23: Patient returns today for right knee pain. She states the previous injection she received on 5/5/2023 worked quite well up until recently. She presents today requesting a repeat injection. She denies any further injuries.    5/5/23: Patient returns today for right knee pain. She states the previous injection she received on 1/13/2023 worked quite well for her up until recently. She would like to have a repeat injection today. She denies any further injuries. She has no additional complaints at this time.    1/13/23: Patient returns today for reevaluation for right knee pain. She states that the  previous injection she received on 3/21/2022 did provide significant relief up until recently. She presents today requesting repeat injection. She denies any recent injuries.    3/21/22: Sharifa is a very pleasant 77-year-old female here today for evaluation of chronic right knee pain. She states that approximately 6 months ago her knee pain insidiously began to worsen without any acute injury or trauma. Referral by Dr Fernando. She does have a history of partial tear of her right Achilles 4 years ago however, she has no other history of right lower extremity pain. She has no surgical history for this knee. She describes an achy pain about her right anterior and lateral aspect of her knee that is worse with standing for too long, kneeling, bending, stairs, standing from a seated position and she does feel unstable at times. Pain rated as moderate in intensity, not associated with any swelling or erythema. She has tried naproxen 1 tab about 2-3 times a week, Tylenol arthritis and Vinny green herbal cream for pain relief. She denies any recent injury. She would like to discuss further treatment options today. No other complaints today.       ROS: All pertinent positive symptoms are included in the history of present illness.    All other systems have been reviewed and are negative and noncontributory to this patient's current ailments.    Objective     There were no vitals filed for this visit.    Physical Exam  ysical Exam  General/Constitutional: No apparent distress. Well-nourished and well developed.  Head: Normocephalic, Atraumatic.   Eyes: EOMI.  Vascular: No edema, swelling or tenderness, except as noted in detailed exam.  Respiratory: Non-labored breathing.  Integumentary: No impressive skin lesions present, except as noted in detailed exam.  Neurological: Oriented to person, place, and time.  Psychological: Normal mood and affect.  Musculoskeletal: Normal, except as noted in detailed exam     The right knee is  without obvious signs of acute bony deformity, swelling, erythema, ecchymosis or joint effusion. The patella is without tenderness. Apprehension is negative with medial and lateral glide. Patella crepitus is negative. Patella grind is positive. The medial joint line is nontender and without bony crepitus or step-off. The lateral joint line is TTP and without bony crepitus or step-off. Flexion & extension are full and symmetrical. Varus & valgus stress test at 0° and 30° of flexion, Lachman's, Bj's, Apley's, dial test at 90° and 30° of flexion, and posterior drawer are all negative. The opposite knee is nontender and stable. Gait is pain-free and tandem.    Patient ID: Sharifa Moore is a 79 y.o. female.    L Inj/Asp: R knee on 9/20/2024 11:16 AM  Indications: pain  Details: 25 G needle, ultrasound-guided superolateral approach  Medications: 80 mg triamcinolone acetonide 40 mg/mL; 4 mL lidocaine 10 mg/mL (1 %)  Outcome: tolerated well, no immediate complications  Procedure, treatment alternatives, risks and benefits explained, specific risks discussed. Consent was given by the patient. Immediately prior to procedure a time out was called to verify the correct patient, procedure, equipment, support staff and site/side marked as required. Patient was prepped and draped in the usual sterile fashion.             Assessment/Plan   Problem List Items Addressed This Visit    None  Visit Diagnoses       Arthritis of right knee        Relevant Orders    Point of Care Ultrasound (Completed)          Considering that she has done well with previous injections, I feel that a repeat injection today is warranted. Discussed risks versus benefits of having injection performed. Patient has elected to proceed with procedure. Please refer to procedure note above. Discussed with patient to limit weightbearing activities over the next 24-48 hours, they can then progress to full activities as tolerated. Discussed with patient to  avoid water submersion over the next 2 days. Discussed with patient to call me immediately if they develop worsening pain, rash, erythema, or fevers. Patient should follow-up as needed if pain persists or worsens.    Param Guzman,   Sports Medicine  [facility]     ** Please excuse any errors in grammar or translation related to this dictation. Voice recognition software was utilized to prepare this document. **

## 2024-11-23 ENCOUNTER — HOSPITAL ENCOUNTER (INPATIENT)
Facility: HOSPITAL | Age: 79
End: 2024-11-23
Attending: EMERGENCY MEDICINE | Admitting: INTERNAL MEDICINE
Payer: MEDICARE

## 2024-11-23 ENCOUNTER — APPOINTMENT (OUTPATIENT)
Dept: RADIOLOGY | Facility: HOSPITAL | Age: 79
End: 2024-11-23
Payer: MEDICARE

## 2024-11-23 DIAGNOSIS — L03.113 RIGHT FOREARM CELLULITIS: Primary | ICD-10-CM
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
## 2024-11-23 DIAGNOSIS — Z78.9 FAILURE OF OUTPATIENT TREATMENT: ICD-10-CM

## 2024-11-23 LAB
ALBUMIN SERPL BCP-MCNC: 4 G/DL (ref 3.4–5)
ALP SERPL-CCNC: 118 U/L (ref 33–136)
ALT SERPL W P-5'-P-CCNC: 52 U/L (ref 7–45)
ANION GAP SERPL CALC-SCNC: 12 MMOL/L (ref 10–20)
AST SERPL W P-5'-P-CCNC: 37 U/L (ref 9–39)
BASOPHILS # BLD AUTO: 0.04 X10*3/UL (ref 0–0.1)
BASOPHILS NFR BLD AUTO: 0.6 %
BILIRUB SERPL-MCNC: 0.7 MG/DL (ref 0–1.2)
BUN SERPL-MCNC: 12 MG/DL (ref 6–23)
CALCIUM SERPL-MCNC: 8.8 MG/DL (ref 8.6–10.3)
CHLORIDE SERPL-SCNC: 109 MMOL/L (ref 98–107)
CO2 SERPL-SCNC: 25 MMOL/L (ref 21–32)
CREAT SERPL-MCNC: 0.58 MG/DL (ref 0.5–1.05)
CRP SERPL-MCNC: 7.11 MG/DL
EGFRCR SERPLBLD CKD-EPI 2021: >90 ML/MIN/1.73M*2
EOSINOPHIL # BLD AUTO: 0.1 X10*3/UL (ref 0–0.4)
EOSINOPHIL NFR BLD AUTO: 1.4 %
ERYTHROCYTE [DISTWIDTH] IN BLOOD BY AUTOMATED COUNT: 14.3 % (ref 11.5–14.5)
ERYTHROCYTE [SEDIMENTATION RATE] IN BLOOD BY WESTERGREN METHOD: 21 MM/H (ref 0–30)
GLUCOSE SERPL-MCNC: 100 MG/DL (ref 74–99)
HCT VFR BLD AUTO: 39.7 % (ref 36–46)
HGB BLD-MCNC: 13.2 G/DL (ref 12–16)
IMM GRANULOCYTES # BLD AUTO: 0.03 X10*3/UL (ref 0–0.5)
IMM GRANULOCYTES NFR BLD AUTO: 0.4 % (ref 0–0.9)
LYMPHOCYTES # BLD AUTO: 1.44 X10*3/UL (ref 0.8–3)
LYMPHOCYTES NFR BLD AUTO: 20.7 %
MCH RBC QN AUTO: 29.9 PG (ref 26–34)
MCHC RBC AUTO-ENTMCNC: 33.2 G/DL (ref 32–36)
MCV RBC AUTO: 90 FL (ref 80–100)
MONOCYTES # BLD AUTO: 0.56 X10*3/UL (ref 0.05–0.8)
MONOCYTES NFR BLD AUTO: 8 %
NEUTROPHILS # BLD AUTO: 4.8 X10*3/UL (ref 1.6–5.5)
NEUTROPHILS NFR BLD AUTO: 68.9 %
NRBC BLD-RTO: 0 /100 WBCS (ref 0–0)
PLATELET # BLD AUTO: 269 X10*3/UL (ref 150–450)
POTASSIUM SERPL-SCNC: 3.8 MMOL/L (ref 3.5–5.3)
PROT SERPL-MCNC: 6.2 G/DL (ref 6.4–8.2)
RBC # BLD AUTO: 4.41 X10*6/UL (ref 4–5.2)
SODIUM SERPL-SCNC: 142 MMOL/L (ref 136–145)
WBC # BLD AUTO: 7 X10*3/UL (ref 4.4–11.3)

## 2024-11-23 PROCEDURE — G0378 HOSPITAL OBSERVATION PER HR: HCPCS

## 2024-11-23 PROCEDURE — 36415 COLL VENOUS BLD VENIPUNCTURE: CPT | Performed by: EMERGENCY MEDICINE

## 2024-11-23 PROCEDURE — 2500000004 HC RX 250 GENERAL PHARMACY W/ HCPCS (ALT 636 FOR OP/ED): Mod: JZ | Performed by: NURSE PRACTITIONER

## 2024-11-23 PROCEDURE — 85652 RBC SED RATE AUTOMATED: CPT | Performed by: EMERGENCY MEDICINE

## 2024-11-23 PROCEDURE — 73090 X-RAY EXAM OF FOREARM: CPT | Mod: RIGHT SIDE | Performed by: RADIOLOGY

## 2024-11-23 PROCEDURE — 2500000001 HC RX 250 WO HCPCS SELF ADMINISTERED DRUGS (ALT 637 FOR MEDICARE OP): Performed by: NURSE PRACTITIONER

## 2024-11-23 PROCEDURE — 2500000004 HC RX 250 GENERAL PHARMACY W/ HCPCS (ALT 636 FOR OP/ED): Performed by: EMERGENCY MEDICINE

## 2024-11-23 PROCEDURE — 96365 THER/PROPH/DIAG IV INF INIT: CPT

## 2024-11-23 PROCEDURE — 99285 EMERGENCY DEPT VISIT HI MDM: CPT

## 2024-11-23 PROCEDURE — 86140 C-REACTIVE PROTEIN: CPT | Performed by: EMERGENCY MEDICINE

## 2024-11-23 PROCEDURE — 87040 BLOOD CULTURE FOR BACTERIA: CPT | Mod: AHULAB | Performed by: EMERGENCY MEDICINE

## 2024-11-23 PROCEDURE — 85025 COMPLETE CBC W/AUTO DIFF WBC: CPT | Performed by: EMERGENCY MEDICINE

## 2024-11-23 PROCEDURE — 80053 COMPREHEN METABOLIC PANEL: CPT | Performed by: EMERGENCY MEDICINE

## 2024-11-23 PROCEDURE — 73090 X-RAY EXAM OF FOREARM: CPT | Mod: RT

## 2024-11-23 PROCEDURE — 96368 THER/DIAG CONCURRENT INF: CPT

## 2024-11-23 PROCEDURE — 96375 TX/PRO/DX INJ NEW DRUG ADDON: CPT

## 2024-11-23 RX ORDER — ATORVASTATIN CALCIUM 10 MG/1
10 TABLET, FILM COATED ORAL NIGHTLY
Status: DISCONTINUED | OUTPATIENT
Start: 2024-11-23 | End: 2024-11-25 | Stop reason: HOSPADM

## 2024-11-23 RX ORDER — CEFTRIAXONE 1 G/50ML
1 INJECTION, SOLUTION INTRAVENOUS EVERY 24 HOURS
Status: DISCONTINUED | OUTPATIENT
Start: 2024-11-24 | End: 2024-11-24

## 2024-11-23 RX ORDER — CEFTRIAXONE 1 G/50ML
1 INJECTION, SOLUTION INTRAVENOUS ONCE
Status: COMPLETED | OUTPATIENT
Start: 2024-11-23 | End: 2024-11-23

## 2024-11-23 RX ORDER — ENOXAPARIN SODIUM 100 MG/ML
40 INJECTION SUBCUTANEOUS EVERY 24 HOURS
Status: DISCONTINUED | OUTPATIENT
Start: 2024-11-24 | End: 2024-11-25 | Stop reason: HOSPADM

## 2024-11-23 RX ORDER — METRONIDAZOLE 500 MG/100ML
500 INJECTION, SOLUTION INTRAVENOUS ONCE
Status: COMPLETED | OUTPATIENT
Start: 2024-11-23 | End: 2024-11-23

## 2024-11-23 RX ORDER — ACETAMINOPHEN 325 MG/1
650 TABLET ORAL EVERY 6 HOURS PRN
Status: DISCONTINUED | OUTPATIENT
Start: 2024-11-23 | End: 2024-11-25 | Stop reason: HOSPADM

## 2024-11-23 RX ORDER — KETOROLAC TROMETHAMINE 30 MG/ML
7.5 INJECTION, SOLUTION INTRAMUSCULAR; INTRAVENOUS EVERY 6 HOURS
Status: COMPLETED | OUTPATIENT
Start: 2024-11-23 | End: 2024-11-23

## 2024-11-23 RX ORDER — KETOROLAC TROMETHAMINE 30 MG/ML
15 INJECTION, SOLUTION INTRAMUSCULAR; INTRAVENOUS ONCE
Status: DISCONTINUED | OUTPATIENT
Start: 2024-11-23 | End: 2024-11-23

## 2024-11-23 RX ORDER — CETIRIZINE HYDROCHLORIDE 10 MG/1
10 TABLET ORAL DAILY
Status: DISCONTINUED | OUTPATIENT
Start: 2024-11-23 | End: 2024-11-25 | Stop reason: HOSPADM

## 2024-11-23 RX ORDER — METRONIDAZOLE 500 MG/100ML
500 INJECTION, SOLUTION INTRAVENOUS EVERY 8 HOURS
Status: DISCONTINUED | OUTPATIENT
Start: 2024-11-23 | End: 2024-11-24

## 2024-11-23 RX ADMIN — CETIRIZINE HYDROCHLORIDE 10 MG: 10 TABLET, FILM COATED ORAL at 15:59

## 2024-11-23 RX ADMIN — KETOROLAC TROMETHAMINE 7.5 MG: 30 INJECTION, SOLUTION INTRAMUSCULAR at 21:20

## 2024-11-23 RX ADMIN — METRONIDAZOLE 500 MG: 500 INJECTION, SOLUTION INTRAVENOUS at 21:19

## 2024-11-23 RX ADMIN — KETOROLAC TROMETHAMINE 7.5 MG: 30 INJECTION, SOLUTION INTRAMUSCULAR at 15:59

## 2024-11-23 RX ADMIN — METRONIDAZOLE 500 MG: 500 INJECTION, SOLUTION INTRAVENOUS at 11:09

## 2024-11-23 RX ADMIN — CEFTRIAXONE SODIUM 1 G: 1 INJECTION, SOLUTION INTRAVENOUS at 10:41

## 2024-11-23 RX ADMIN — ATORVASTATIN CALCIUM 10 MG: 10 TABLET, FILM COATED ORAL at 21:20

## 2024-11-23 SDOH — SOCIAL STABILITY: SOCIAL INSECURITY: DO YOU FEEL ANYONE HAS EXPLOITED OR TAKEN ADVANTAGE OF YOU FINANCIALLY OR OF YOUR PERSONAL PROPERTY?: NO

## 2024-11-23 SDOH — SOCIAL STABILITY: SOCIAL INSECURITY: WITHIN THE LAST YEAR, HAVE YOU BEEN AFRAID OF YOUR PARTNER OR EX-PARTNER?: NO

## 2024-11-23 SDOH — SOCIAL STABILITY: SOCIAL INSECURITY: WITHIN THE LAST YEAR, HAVE YOU BEEN HUMILIATED OR EMOTIONALLY ABUSED IN OTHER WAYS BY YOUR PARTNER OR EX-PARTNER?: NO

## 2024-11-23 SDOH — SOCIAL STABILITY: SOCIAL INSECURITY
WITHIN THE LAST YEAR, HAVE YOU BEEN KICKED, HIT, SLAPPED, OR OTHERWISE PHYSICALLY HURT BY YOUR PARTNER OR EX-PARTNER?: NO

## 2024-11-23 SDOH — SOCIAL STABILITY: SOCIAL INSECURITY
WITHIN THE LAST YEAR, HAVE YOU BEEN RAPED OR FORCED TO HAVE ANY KIND OF SEXUAL ACTIVITY BY YOUR PARTNER OR EX-PARTNER?: NO

## 2024-11-23 SDOH — ECONOMIC STABILITY: FOOD INSECURITY: WITHIN THE PAST 12 MONTHS, THE FOOD YOU BOUGHT JUST DIDN'T LAST AND YOU DIDN'T HAVE MONEY TO GET MORE.: NEVER TRUE

## 2024-11-23 SDOH — SOCIAL STABILITY: SOCIAL INSECURITY: HAVE YOU HAD ANY THOUGHTS OF HARMING ANYONE ELSE?: NO

## 2024-11-23 SDOH — SOCIAL STABILITY: SOCIAL INSECURITY: ABUSE: ADULT

## 2024-11-23 SDOH — SOCIAL STABILITY: SOCIAL INSECURITY: HAVE YOU HAD THOUGHTS OF HARMING ANYONE ELSE?: NO

## 2024-11-23 SDOH — SOCIAL STABILITY: SOCIAL INSECURITY: DO YOU FEEL UNSAFE GOING BACK TO THE PLACE WHERE YOU ARE LIVING?: NO

## 2024-11-23 SDOH — ECONOMIC STABILITY: FOOD INSECURITY: WITHIN THE PAST 12 MONTHS, YOU WORRIED THAT YOUR FOOD WOULD RUN OUT BEFORE YOU GOT THE MONEY TO BUY MORE.: NEVER TRUE

## 2024-11-23 SDOH — SOCIAL STABILITY: SOCIAL INSECURITY: HAS ANYONE EVER THREATENED TO HURT YOUR FAMILY OR YOUR PETS?: NO

## 2024-11-23 SDOH — SOCIAL STABILITY: SOCIAL INSECURITY: DOES ANYONE TRY TO KEEP YOU FROM HAVING/CONTACTING OTHER FRIENDS OR DOING THINGS OUTSIDE YOUR HOME?: NO

## 2024-11-23 SDOH — ECONOMIC STABILITY: INCOME INSECURITY: IN THE PAST 12 MONTHS HAS THE ELECTRIC, GAS, OIL, OR WATER COMPANY THREATENED TO SHUT OFF SERVICES IN YOUR HOME?: NO

## 2024-11-23 SDOH — SOCIAL STABILITY: SOCIAL INSECURITY: ARE YOU OR HAVE YOU BEEN THREATENED OR ABUSED PHYSICALLY, EMOTIONALLY, OR SEXUALLY BY ANYONE?: NO

## 2024-11-23 SDOH — SOCIAL STABILITY: SOCIAL INSECURITY: ARE THERE ANY APPARENT SIGNS OF INJURIES/BEHAVIORS THAT COULD BE RELATED TO ABUSE/NEGLECT?: NO

## 2024-11-23 ASSESSMENT — LIFESTYLE VARIABLES
AUDIT-C TOTAL SCORE: 0
PRESCIPTION_ABUSE_PAST_12_MONTHS: NO
AUDIT-C TOTAL SCORE: 0
HOW OFTEN DO YOU HAVE 6 OR MORE DRINKS ON ONE OCCASION: NEVER
SUBSTANCE_ABUSE_PAST_12_MONTHS: NO
HOW MANY STANDARD DRINKS CONTAINING ALCOHOL DO YOU HAVE ON A TYPICAL DAY: PATIENT DOES NOT DRINK
HOW OFTEN DO YOU HAVE A DRINK CONTAINING ALCOHOL: NEVER
SKIP TO QUESTIONS 9-10: 1

## 2024-11-23 ASSESSMENT — PATIENT HEALTH QUESTIONNAIRE - PHQ9
2. FEELING DOWN, DEPRESSED OR HOPELESS: NOT AT ALL
SUM OF ALL RESPONSES TO PHQ9 QUESTIONS 1 & 2: 0
1. LITTLE INTEREST OR PLEASURE IN DOING THINGS: NOT AT ALL

## 2024-11-23 ASSESSMENT — ACTIVITIES OF DAILY LIVING (ADL)
LACK_OF_TRANSPORTATION: NO
WALKS IN HOME: INDEPENDENT
JUDGMENT_ADEQUATE_SAFELY_COMPLETE_DAILY_ACTIVITIES: YES
GROOMING: INDEPENDENT
BATHING: INDEPENDENT
ASSISTIVE_DEVICE: EYEGLASSES
HEARING - LEFT EAR: FUNCTIONAL
HEARING - RIGHT EAR: FUNCTIONAL
ADEQUATE_TO_COMPLETE_ADL: YES
DRESSING YOURSELF: INDEPENDENT
PATIENT'S MEMORY ADEQUATE TO SAFELY COMPLETE DAILY ACTIVITIES?: YES
TOILETING: INDEPENDENT
FEEDING YOURSELF: INDEPENDENT

## 2024-11-23 ASSESSMENT — PAIN - FUNCTIONAL ASSESSMENT: PAIN_FUNCTIONAL_ASSESSMENT: 0-10

## 2024-11-23 ASSESSMENT — ENCOUNTER SYMPTOMS
EYES NEGATIVE: 1
CONSTITUTIONAL NEGATIVE: 1
RESPIRATORY NEGATIVE: 1
NEUROLOGICAL NEGATIVE: 1
ALLERGIC/IMMUNOLOGIC NEGATIVE: 1
WOUND: 1
MUSCULOSKELETAL NEGATIVE: 1
CARDIOVASCULAR NEGATIVE: 1
HEMATOLOGIC/LYMPHATIC NEGATIVE: 1
COLOR CHANGE: 1
ENDOCRINE NEGATIVE: 1
PSYCHIATRIC NEGATIVE: 1
GASTROINTESTINAL NEGATIVE: 1

## 2024-11-23 ASSESSMENT — COGNITIVE AND FUNCTIONAL STATUS - GENERAL
PATIENT BASELINE BEDBOUND: NO
MOBILITY SCORE: 24
DAILY ACTIVITIY SCORE: 24

## 2024-11-23 ASSESSMENT — PAIN SCALES - GENERAL: PAINLEVEL_OUTOF10: 0 - NO PAIN

## 2024-11-23 ASSESSMENT — PAIN DESCRIPTION - PROGRESSION: CLINICAL_PROGRESSION: NOT CHANGED

## 2024-11-23 NOTE — PROGRESS NOTES
Patient presented to ED 2/2 cat bite which had increased in size after 2 days of oral atb. Patient now being treat with IVATB. Plan for return home.

## 2024-11-23 NOTE — ED TRIAGE NOTES
Patient presents to ED from home for a cat bite that she is currently being treated for with 2 ABX. She did receive her tetanus but the bite is more swollen, red, and sore to touch. She has been on ABX for 2 days.

## 2024-11-23 NOTE — ED PROVIDER NOTES
History/Exam limitations: none.   Additional history was obtained from patient.          HPI:    Sharifa Moore is a 79 y.o. female presenting for evaluation of worsening redness to the right forearm s/p recent cat bite.  Patient states that on Tuesday, 4 days ago she was bit by her cat.  Her cat is vaccinated, indoor, not aggressive.  They were playing rough and the tooth manage 2 landed the volar aspect of her right forearm.  She states the following day she had some redness and went to urgent care and was started on cefuroxime/Flagyl.  She is taken 2 full days of the antibiotic however the redness has increased and spread to her elbow and she had some swelling at the bite david and some clearish bloody fluid began leaking earlier today.  Leakage resolved in the area scabbed over.  Denies any fever.  States the pain is slightly improved since onset.  Denies any history immunosuppression.  Reports no imaging has been performed on forearm.          Physical Exam:  ED Triage Vitals [11/23/24 0944]   Temperature Heart Rate Respirations BP   36.2 °C (97.2 °F) 91 18 (!) 141/97      Pulse Ox Temp Source Heart Rate Source Patient Position   98 % Temporal Monitor Sitting      BP Location FiO2 (%)     Left arm --        GEN:      Alert, NAD  HENT:      NC/AT, OP clear, airway patent, MM  CV:      RRR, no MRG, no LE pitting edema, 2+ radial and pedal pulses  PULM:     CTAB, no w/r/r, easy WOB, symmetric chest rise  ABD:      Soft, NT, ND, no masses, BS +  NEURO:   A&Ox3, no focal deficits    MSK:      FROM, no joint deformities or swelling, no e/o trauma  SKIN:       Warm and dry, diffuse warmth and erythema surrounding and more significant proximal to punctate area of erythema/scabbing, no active drainage, no palpable fluctuance, no palpable crepitus, compartments soft, see image below, no LAD  PSYCH:    Appropriate mood and affect         MDM/ED Course:   Sahrifa Moore is a 79 y.o. female presenting for evaluation  of worsening redness to the right forearm s/p recent cat bite.  Vitals reassuring per exam as documented above.  Presentation most consistent with cellulitis status post cat bite.  Differential includes necrotizing soft tissue infection, foreign body, abscess.  X-ray obtained and no evidence of foreign body.  Bedside ultrasound was performed and there is no evidence of a fluid drainable fluid collection, no visible soft tissue gas.  Compartments soft, no evidence of compartment syndrome.  IV placed and labs drawn.  CBC with no leukocytosis or significant anemia, no left shift.  Chemistry overall reassuring.  Sed rate negative.  CRP elevated at 7.11.  Discussed with pharmacy, based on patient's penicillin allergy plan to cover for ceftriaxone/Flagyl.  No evidence of MRSA at this time.  No cat scratches, will defer azithromycin at this time.  Given patient has been taking appropriate oral antibiotic regimen with worsening symptoms patient would benefit from admission for IV antibiotics and continued monitoring of erythema given has tracked to the elbow.  Orthopedics consulted as below and agrees with plan for admission here at [facility] if not improving recommends getting MRI.  Did reach out to transfer center and no beds available at [facility].  Patient agreeable to plan.  Border of erythema was traced with a skin marker.  Patient care signed out to admitting provider for continued management.     ED Course as of 11/27/24 1020   Sat Nov 23, 2024   1228 No beds available at [facility]. [JM]   1256 I reached Dr. Briceno from hand surgery, agrees with admission here for IV antibiotics if not improving recommends MRI [JM]   1357 Bedside ultrasound displays no focal fluid collection, no significant evidence of subcutaneous gas. [JM]      ED Course User Index  [JM] Champ Felix MD         Diagnoses as of 11/27/24 1020   Right forearm cellulitis   Cat bite, initial encounter   Failure of outpatient treatment         Chronic  medical conditions affecting care listed in MDM. I independently reviewed imaging studies and agreed with radiology reads. I reviewed recent and relevant outside records including PCP notes, Prior discharge summaries, and prior radiology reports.    Procedure  Procedures    Diagnosis:   1.  Right forearm cellulitis  2.  Cat bite  3.  Failure of outpatient treatment    Dispo: Hospitalized in stable condition      Disclaimer: Portions of this note were dictated by speech recognition. An attempt at proof reading was made to minimize errors. Minor errors in transcription may be present.  Please call if questions.     Champ Felix MD  11/27/24 7989

## 2024-11-23 NOTE — PROGRESS NOTES
Pharmacy Medication History Review   Spoke to the patient, Only medication pt takes daily is Lipitor.  Started 2 antibiotics on Thursday (Flagyl and Cefuroxime) patient took 2 days     Sharifa Moore is a 79 y.o. female admitted for No Principal Problem: There is no principal problem currently on the Problem List. Please update the Problem List and refresh.. Pharmacy reviewed the patient's [id number] medications and allergies for accuracy.    The list below reflectives the updated PTA list. Please review each medication in order reconciliation for additional clarification and justification.     Prior to Admission Medications   Prescriptions Last Dose Informant   acetaminophen (Tylenol Arthritis Pain) 650 mg ER tablet     Sig: Take by mouth every 8 hours if needed.   atorvastatin (Lipitor) 10 mg tablet 11/22/2024 Bedtime    Sig: Take 1 tablet (10 mg) by mouth once daily.   Patient taking differently: Take 1 tablet (10 mg) by mouth once daily at bedtime.   clindamycin (Cleocin) 300 mg capsule     Sig: TAKE 2 CAPSULES BY MOUTH ONE HOUR PRIOR TO APPT   fexofenadine (Allegra) 180 mg tablet     Sig: Take 1 tablet (180 mg) by mouth once daily as needed (allergies).   ibuprofen 800 mg tablet     Sig: Take 1 tablet (800 mg) by mouth every 8 hours if needed for moderate pain (4 - 6).   tiZANidine (Zanaflex) 4 mg capsule     Sig: Take 1 capsule (4 mg) by mouth 3 times a day for 7 days.   Patient not taking: Reported on 6/1/2024      Facility-Administered Medications: None       The list below reflectives the updated allergy list. Please review each documented allergy for additional clarification and justification.  Allergies  Reviewed by Casandra Glover on 11/23/2024        Severity Reactions Comments    Ampicillin Not Specified Unknown     Penicillins Not Specified Hives     Sulfamethoxazole Not Specified Hives             Below are additional concerns with the patient's PTA list.      Casandra Glover

## 2024-11-23 NOTE — CARE PLAN
The patient's goals for the shift include  decrease arm swelling with IV ABX    The clinical goals for the shift include decrease arm swelling with IV ABX      Problem: Pain - Adult  Goal: Verbalizes/displays adequate comfort level or baseline comfort level  Outcome: Progressing     Problem: Safety - Adult  Goal: Free from fall injury  Outcome: Progressing     Problem: Discharge Planning  Goal: Discharge to home or other facility with appropriate resources  Outcome: Progressing     Problem: Chronic Conditions and Co-morbidities  Goal: Patient's chronic conditions and co-morbidity symptoms are monitored and maintained or improved  Outcome: Progressing

## 2024-11-23 NOTE — H&P
History Of Present Illness  Sharifa Moore is a 79 y.o. female presenting with right arm redness and raised area of cat bite site.    ED HPI:  Sharifa Moore is a 79 y.o. female presenting for evaluation of worsening redness to the right forearm.  Patient states that on Tuesday, 4 days ago she was bit by her cat.  Her cat is vaccinated, indoor, not aggressive.  They were playing rough and the tooth manage 2 landed the volar aspect of her right forearm.  She states the following day she had some redness and went to urgent care and was started on cefuroxime/Flagyl.  She is taken 2 full days of the antibiotic however the redness has increased and spread to her elbow and she had some swelling at the bite david and some clearish bloody fluid began leaking earlier today.  Leakage resolved in the area scabbed over.  Denies any fever.  States the pain is slightly improved since onset.  Denies any history immunosuppression.  Reports no imaging has been performed on forearm.   ED events :  1228 No beds available at [facility]. [JM]   1256 I reached Dr. Briceno from hand surgery, agrees with admission here for IV antibiotics if not improving recommends MRI [JM]     On interview of the patient in the ED she tells me that she went to urgent care two days ago and was started on oral ATBs after she noticed redness to the arm after a cat bite. Then the arm is not getting any better, in fact seems worse, no fever, chills constipation, can move the right arm up and down, has a HX of right should replacement. Denies shortness of breath, cheat pain, swelling, diarrhea or other complaints.   In the ED forearm x ray done no foreign bodies or osteo changes.  Patient to be admitted to obs for IV antibiotics, ID consulted and monitor, may need MRI.        Past Medical History  Past Medical History:   Diagnosis Date    Allergic     Anemia 05/20/2024    Arthritis     Chronic vaginitis 09/20/2023    GI bleed 07/20/2018    History of  arthroplasty of right shoulder 2024    Pain in left hip 2022    Left hip pain    Personal history of urinary (tract) infections 2014    Personal history of urinary tract infection    Scoliosis     Subacute and chronic vaginitis 2015    Chronic vaginitis    Tear of medial meniscus of knee 2023    Vertigo, benign positional 2023       Surgical History  Past Surgical History:   Procedure Laterality Date     SECTION, CLASSIC  2014     Section     SECTION, LOW TRANSVERSE      COLONOSCOPY  2014    Complete Colonoscopy    FOOT SURGERY Left     TOTAL SHOULDER ARTHROPLASTY Right     WISDOM TOOTH EXTRACTION          Social History  She reports that she has never smoked. She has never used smokeless tobacco. She reports that she does not drink alcohol and does not use drugs.  Lives with daughter  Independent ADLs  + drives  Retired     Family History  Family History   Problem Relation Name Age of Onset    Osteoporosis Mother Anne-Marie Conley     Stroke Mother Anne-Marie Jarvis     Colon cancer Father Haris Conley     Hypertension Father Haris Conley     Migraines Father Haris Conley     Prostate cancer Father Haris Conley     Arthritis Father Haris Conley     Kidney disease Father Haris Conley     Asthma Daughter Elmira Moore     Diabetes Daughter Elmira Moore     Breast cancer Maternal Grandmother Alena Thomas     Cancer Maternal Grandmother Alena Vaughanhee     Hypertension Other      Cancer Other          Allergies  Ampicillin, Penicillins, and Sulfamethoxazole    Review of Systems   Constitutional: Negative.    HENT: Negative.     Eyes: Negative.    Respiratory: Negative.     Cardiovascular: Negative.    Gastrointestinal: Negative.    Endocrine: Negative.    Genitourinary: Negative.    Musculoskeletal: Negative.    Skin:  Positive for color change and wound.        Right arm red, selling and obvious raised area    Allergic/Immunologic:  "Negative.    Neurological: Negative.    Hematological: Negative.    Psychiatric/Behavioral: Negative.          Physical Exam  Constitutional:       Appearance: Normal appearance.   HENT:      Mouth/Throat:      Mouth: Mucous membranes are moist.   Cardiovascular:      Rate and Rhythm: Regular rhythm.   Pulmonary:      Effort: Pulmonary effort is normal.      Breath sounds: Normal breath sounds.   Abdominal:      Palpations: Abdomen is soft.   Musculoskeletal:         General: Normal range of motion.   Skin:     Findings: Erythema present.      Comments: Right forearm see pic of cat bite area marked borders and raised area lump    Neurological:      General: No focal deficit present.      Mental Status: She is alert and oriented to person, place, and time.          Last Recorded Vitals  Blood pressure 145/79, pulse 75, temperature 36.3 °C (97.3 °F), temperature source Oral, resp. rate 18, height 1.575 m (5' 2\"), weight 72.6 kg (160 lb), SpO2 98%.    Relevant Results  Results for orders placed or performed during the hospital encounter of 11/23/24 (from the past 24 hours)   CBC and Auto Differential   Result Value Ref Range    WBC 7.0 4.4 - 11.3 x10*3/uL    nRBC 0.0 0.0 - 0.0 /100 WBCs    RBC 4.41 4.00 - 5.20 x10*6/uL    Hemoglobin 13.2 12.0 - 16.0 g/dL    Hematocrit 39.7 36.0 - 46.0 %    MCV 90 80 - 100 fL    MCH 29.9 26.0 - 34.0 pg    MCHC 33.2 32.0 - 36.0 g/dL    RDW 14.3 11.5 - 14.5 %    Platelets 269 150 - 450 x10*3/uL    Neutrophils % 68.9 40.0 - 80.0 %    Immature Granulocytes %, Automated 0.4 0.0 - 0.9 %    Lymphocytes % 20.7 13.0 - 44.0 %    Monocytes % 8.0 2.0 - 10.0 %    Eosinophils % 1.4 0.0 - 6.0 %    Basophils % 0.6 0.0 - 2.0 %    Neutrophils Absolute 4.80 1.60 - 5.50 x10*3/uL    Immature Granulocytes Absolute, Automated 0.03 0.00 - 0.50 x10*3/uL    Lymphocytes Absolute 1.44 0.80 - 3.00 x10*3/uL    Monocytes Absolute 0.56 0.05 - 0.80 x10*3/uL    Eosinophils Absolute 0.10 0.00 - 0.40 x10*3/uL    Basophils " Absolute 0.04 0.00 - 0.10 x10*3/uL   Comprehensive metabolic panel   Result Value Ref Range    Glucose 100 (H) 74 - 99 mg/dL    Sodium 142 136 - 145 mmol/L    Potassium 3.8 3.5 - 5.3 mmol/L    Chloride 109 (H) 98 - 107 mmol/L    Bicarbonate 25 21 - 32 mmol/L    Anion Gap 12 10 - 20 mmol/L    Urea Nitrogen 12 6 - 23 mg/dL    Creatinine 0.58 0.50 - 1.05 mg/dL    eGFR >90 >60 mL/min/1.73m*2    Calcium 8.8 8.6 - 10.3 mg/dL    Albumin 4.0 3.4 - 5.0 g/dL    Alkaline Phosphatase 118 33 - 136 U/L    Total Protein 6.2 (L) 6.4 - 8.2 g/dL    AST 37 9 - 39 U/L    Bilirubin, Total 0.7 0.0 - 1.2 mg/dL    ALT 52 (H) 7 - 45 U/L   Sedimentation rate, automated   Result Value Ref Range    Sedimentation Rate 21 0 - 30 mm/h      XR forearm right 2 views   Final Result   No acute osseous abnormalities.   No evidence of foreign body.   Signed by Isaak Villalba MD      Point of Care Ultrasound    (Results Pending)         Assessment/Plan   Patient with HX of hyperlipidemia, HTN well managed and osteoarthritis who is seen in the ED with concerns of cellulitis from recent cat bite.   Assessment & Plan  Right forearm cellulitis       Cellulitis  -Localized to right forearm , no s/s of sepsis, XR negative for osteous changes   -Failed outpatient antibiotics from urgent care   -c/w IV antibiotics   - Image in media tab reviewed   -blood cultures   -Supportive care with analgesics  - Tordol times two doses   -ID consulted    Hyperlipidemia   Home atorvastatin     DVT prophalysis       Kyra Blair, APRN-CNP

## 2024-11-24 VITALS
WEIGHT: 160 LBS | OXYGEN SATURATION: 97 % | TEMPERATURE: 97 F | HEIGHT: 62 IN | HEART RATE: 86 BPM | RESPIRATION RATE: 17 BRPM | SYSTOLIC BLOOD PRESSURE: 127 MMHG | DIASTOLIC BLOOD PRESSURE: 71 MMHG | BODY MASS INDEX: 29.44 KG/M2

## 2024-11-24 LAB
ANION GAP SERPL CALC-SCNC: 11 MMOL/L (ref 10–20)
BACTERIA BLD CULT: NORMAL
BASOPHILS # BLD AUTO: 0.03 X10*3/UL (ref 0–0.1)
BASOPHILS NFR BLD AUTO: 0.5 %
BUN SERPL-MCNC: 15 MG/DL (ref 6–23)
CALCIUM SERPL-MCNC: 8.1 MG/DL (ref 8.6–10.3)
CHLORIDE SERPL-SCNC: 110 MMOL/L (ref 98–107)
CO2 SERPL-SCNC: 24 MMOL/L (ref 21–32)
CREAT SERPL-MCNC: 0.62 MG/DL (ref 0.5–1.05)
EGFRCR SERPLBLD CKD-EPI 2021: >90 ML/MIN/1.73M*2
EOSINOPHIL # BLD AUTO: 0.13 X10*3/UL (ref 0–0.4)
EOSINOPHIL NFR BLD AUTO: 2.1 %
ERYTHROCYTE [DISTWIDTH] IN BLOOD BY AUTOMATED COUNT: 14.3 % (ref 11.5–14.5)
GLUCOSE SERPL-MCNC: 100 MG/DL (ref 74–99)
HCT VFR BLD AUTO: 35.9 % (ref 36–46)
HGB BLD-MCNC: 11.8 G/DL (ref 12–16)
IMM GRANULOCYTES # BLD AUTO: 0.02 X10*3/UL (ref 0–0.5)
IMM GRANULOCYTES NFR BLD AUTO: 0.3 % (ref 0–0.9)
LYMPHOCYTES # BLD AUTO: 1.72 X10*3/UL (ref 0.8–3)
LYMPHOCYTES NFR BLD AUTO: 28.4 %
MAGNESIUM SERPL-MCNC: 2.17 MG/DL (ref 1.6–2.4)
MCH RBC QN AUTO: 29.7 PG (ref 26–34)
MCHC RBC AUTO-ENTMCNC: 32.9 G/DL (ref 32–36)
MCV RBC AUTO: 90 FL (ref 80–100)
MONOCYTES # BLD AUTO: 0.64 X10*3/UL (ref 0.05–0.8)
MONOCYTES NFR BLD AUTO: 10.6 %
NEUTROPHILS # BLD AUTO: 3.51 X10*3/UL (ref 1.6–5.5)
NEUTROPHILS NFR BLD AUTO: 58.1 %
NRBC BLD-RTO: 0 /100 WBCS (ref 0–0)
PLATELET # BLD AUTO: 252 X10*3/UL (ref 150–450)
POTASSIUM SERPL-SCNC: 3.9 MMOL/L (ref 3.5–5.3)
RBC # BLD AUTO: 3.97 X10*6/UL (ref 4–5.2)
SODIUM SERPL-SCNC: 141 MMOL/L (ref 136–145)
WBC # BLD AUTO: 6.1 X10*3/UL (ref 4.4–11.3)

## 2024-11-24 PROCEDURE — 99232 SBSQ HOSP IP/OBS MODERATE 35: CPT

## 2024-11-24 PROCEDURE — 1100000001 HC PRIVATE ROOM DAILY

## 2024-11-24 PROCEDURE — 36415 COLL VENOUS BLD VENIPUNCTURE: CPT | Performed by: NURSE PRACTITIONER

## 2024-11-24 PROCEDURE — 80048 BASIC METABOLIC PNL TOTAL CA: CPT | Performed by: NURSE PRACTITIONER

## 2024-11-24 PROCEDURE — 99222 1ST HOSP IP/OBS MODERATE 55: CPT | Performed by: STUDENT IN AN ORGANIZED HEALTH CARE EDUCATION/TRAINING PROGRAM

## 2024-11-24 PROCEDURE — 85025 COMPLETE CBC W/AUTO DIFF WBC: CPT | Performed by: NURSE PRACTITIONER

## 2024-11-24 PROCEDURE — 2500000004 HC RX 250 GENERAL PHARMACY W/ HCPCS (ALT 636 FOR OP/ED): Performed by: STUDENT IN AN ORGANIZED HEALTH CARE EDUCATION/TRAINING PROGRAM

## 2024-11-24 PROCEDURE — 83735 ASSAY OF MAGNESIUM: CPT | Performed by: NURSE PRACTITIONER

## 2024-11-24 PROCEDURE — 2500000001 HC RX 250 WO HCPCS SELF ADMINISTERED DRUGS (ALT 637 FOR MEDICARE OP): Performed by: NURSE PRACTITIONER

## 2024-11-24 PROCEDURE — 2500000004 HC RX 250 GENERAL PHARMACY W/ HCPCS (ALT 636 FOR OP/ED): Mod: JZ | Performed by: NURSE PRACTITIONER

## 2024-11-24 PROCEDURE — 99233 SBSQ HOSP IP/OBS HIGH 50: CPT | Performed by: INTERNAL MEDICINE

## 2024-11-24 RX ORDER — VANCOMYCIN HYDROCHLORIDE 1 G/20ML
INJECTION, POWDER, LYOPHILIZED, FOR SOLUTION INTRAVENOUS DAILY PRN
Status: DISCONTINUED | OUTPATIENT
Start: 2024-11-24 | End: 2024-11-25 | Stop reason: HOSPADM

## 2024-11-24 RX ORDER — VANCOMYCIN HYDROCHLORIDE 1 G/200ML
1000 INJECTION, SOLUTION INTRAVENOUS EVERY 12 HOURS
Status: DISCONTINUED | OUTPATIENT
Start: 2024-11-24 | End: 2024-11-25 | Stop reason: HOSPADM

## 2024-11-24 RX ORDER — POLYETHYLENE GLYCOL 3350 17 G/17G
17 POWDER, FOR SOLUTION ORAL DAILY PRN
Status: DISCONTINUED | OUTPATIENT
Start: 2024-11-24 | End: 2024-11-25 | Stop reason: HOSPADM

## 2024-11-24 RX ORDER — MEROPENEM 1 G/1
1 INJECTION, POWDER, FOR SOLUTION INTRAVENOUS EVERY 8 HOURS
Status: DISCONTINUED | OUTPATIENT
Start: 2024-11-24 | End: 2024-11-25 | Stop reason: HOSPADM

## 2024-11-24 RX ADMIN — METRONIDAZOLE 500 MG: 500 INJECTION, SOLUTION INTRAVENOUS at 05:43

## 2024-11-24 RX ADMIN — CETIRIZINE HYDROCHLORIDE 10 MG: 10 TABLET, FILM COATED ORAL at 09:51

## 2024-11-24 RX ADMIN — MEROPENEM 1 G: 1 INJECTION, POWDER, FOR SOLUTION INTRAVENOUS at 16:31

## 2024-11-24 RX ADMIN — VANCOMYCIN HYDROCHLORIDE 1000 MG: 1 INJECTION, SOLUTION INTRAVENOUS at 11:33

## 2024-11-24 RX ADMIN — MEROPENEM 1 G: 1 INJECTION, POWDER, FOR SOLUTION INTRAVENOUS at 22:49

## 2024-11-24 RX ADMIN — MEROPENEM 1 G: 1 INJECTION, POWDER, FOR SOLUTION INTRAVENOUS at 09:51

## 2024-11-24 RX ADMIN — ATORVASTATIN CALCIUM 10 MG: 10 TABLET, FILM COATED ORAL at 22:38

## 2024-11-24 RX ADMIN — ENOXAPARIN SODIUM 40 MG: 40 INJECTION SUBCUTANEOUS at 22:38

## 2024-11-24 RX ADMIN — VANCOMYCIN HYDROCHLORIDE 1000 MG: 1 INJECTION, SOLUTION INTRAVENOUS at 22:39

## 2024-11-24 ASSESSMENT — COGNITIVE AND FUNCTIONAL STATUS - GENERAL
MOBILITY SCORE: 24
DAILY ACTIVITIY SCORE: 24
MOBILITY SCORE: 24
DAILY ACTIVITIY SCORE: 24
MOBILITY SCORE: 24
DAILY ACTIVITIY SCORE: 24
MOBILITY SCORE: 24
DAILY ACTIVITIY SCORE: 24

## 2024-11-24 ASSESSMENT — PAIN - FUNCTIONAL ASSESSMENT
PAIN_FUNCTIONAL_ASSESSMENT: 0-10
PAIN_FUNCTIONAL_ASSESSMENT: 0-10

## 2024-11-24 ASSESSMENT — PAIN SCALES - GENERAL
PAINLEVEL_OUTOF10: 0 - NO PAIN

## 2024-11-24 ASSESSMENT — ACTIVITIES OF DAILY LIVING (ADL): LACK_OF_TRANSPORTATION: NO

## 2024-11-24 NOTE — PROGRESS NOTES
11/24/24 4444   [facility] Disability Status   Are you deaf or do you have serious difficulty hearing? N   Are you blind or do you have serious difficulty seeing, even when wearing glasses? N   Because of a physical, mental, or emotional condition, do you have serious difficulty concentrating, remembering, or making decisions? (5 years old or older) N   Do you have serious difficulty walking or climbing stairs? N   Do you have serious difficulty dressing or bathing? N   Because of a physical, mental, or emotional condition, do you have serious difficulty doing errands alone such as visiting the doctor? N

## 2024-11-24 NOTE — CARE PLAN
The patient's goals for the shift include      The clinical goals for the shift include decrease arm swelling with IV ABX      Problem: Pain - Adult  Goal: Verbalizes/displays adequate comfort level or baseline comfort level  Outcome: Progressing     Problem: Safety - Adult  Goal: Free from fall injury  Outcome: Progressing     Problem: Discharge Planning  Goal: Discharge to home or other facility with appropriate resources  Outcome: Progressing     Problem: Chronic Conditions and Co-morbidities  Goal: Patient's chronic conditions and co-morbidity symptoms are monitored and maintained or improved  Outcome: Progressing

## 2024-11-24 NOTE — CONSULTS
CHIEF COMPLAINT: R volar FA infection   DOI:11/19/24  DOS:none      HISTORY OF PRESENT ILLNESS    Is a 79-year-old female, right-hand-dominant, retired, lives with , functionally independent, non-smoker nondiabetic not on anticoagulation.  She sustained a cat bite from her cat on the right volar forearm on 11/19/2024.  She notes over the past few days worsening area of swelling redness and pain about the volar forearm.  She presented to the [facility] emergency department where she was started on IV antibiotics.  An ultrasound was obtained which was negative for any fluid collection.  The patient does endorse some focal pain about the site of the Bite.  Denies any numbness tingling or paresthesias.  Denies any pain symptoms in her upper arm wrist or hand.  Pain is localized to her forearm.  Especially at the site of induration where the cat bite was.    PHYSICAL EXAM    Extremities / Musculoskeletal:                  Right upper extremity:  Large area of erythema about the volar right forearm.  There is a skin marker outline for was on presentation and is resolved since then.  There is a small approximately 1.5 cm in diameter by 0.5 in height indurated area at the site of the Bite with a small scab.  This is firm and indurated there is no appreciable fluctuant fluid collection.  Unable to palpate the forearm with mild discomfort for the patient.  Full active elbow range of motion full active wrist range of motion full digital composite flexion full extended digital.  Motor intact radial ulnar median AIN PIN.  Sensation tact light touch radial ulnar median.  Compartments soft and compressible      IMAGING / LABS / EMGs:    Right forearm Xrs independently reviewed and negative for any obvious acute displaced fracture or dislocation.  No signs of retained radiopaque foreign bodies.      Past Medical History:   Diagnosis Date    Allergic     Anemia 05/20/2024    Arthritis     Chronic vaginitis 09/20/2023    GI  bleed 2018    History of arthroplasty of right shoulder 2024    Pain in left hip 2022    Left hip pain    Personal history of urinary (tract) infections 2014    Personal history of urinary tract infection    Scoliosis     Subacute and chronic vaginitis 2015    Chronic vaginitis    Tear of medial meniscus of knee 2023    Vertigo, benign positional 2023       Medication Documentation Review Audit       Reviewed by Casandra Glover (Technician) on 24 at 1459      Medication Order Taking? Sig Documenting Provider Last Dose Status   acetaminophen (Tylenol Arthritis Pain) 650 mg ER tablet [id number]  Take by mouth every 8 hours if needed. Historical Provider, MD  Active   atorvastatin (Lipitor) 10 mg tablet [id number] Yes Take 1 tablet (10 mg) by mouth once daily.   Patient taking differently: Take 1 tablet (10 mg) by mouth once daily at bedtime.    Nicole Quiles MD 2024 Bedtime Active   clindamycin (Cleocin) 300 mg capsule [id number]  TAKE 2 CAPSULES BY MOUTH ONE HOUR PRIOR TO APPT Historical Provider, MD  Active   fexofenadine (Allegra) 180 mg tablet [id number]  Take 1 tablet (180 mg) by mouth once daily as needed (allergies). Historical Provider, MD  Active   ibuprofen 800 mg tablet [id number]  Take 1 tablet (800 mg) by mouth every 8 hours if needed for moderate pain (4 - 6). Nicole Quiles MD  Active   tiZANidine (Zanaflex) 4 mg capsule [id number]  Take 1 capsule (4 mg) by mouth 3 times a day for 7 days.   Patient not taking: Reported on 2024    Dov Jo MD   24 2359                    Allergies   Allergen Reactions    Ampicillin Unknown    Penicillins Hives    Sulfamethoxazole Hives       Past Surgical History:   Procedure Laterality Date     SECTION, CLASSIC  2014     Section     SECTION, LOW TRANSVERSE      COLONOSCOPY  2014    Complete Colonoscopy    FOOT SURGERY Left     TOTAL SHOULDER ARTHROPLASTY Right     WISDOM  TOOTH EXTRACTION           ASSESSMENT:   Right volar forearm cellulitis in the setting of a cat bite.      Her cellulitis appears to be dramatically improving since being on IV antibiotics.  There is a small focal indurated soft tissue mass at the site of her cat bite.  If this were fluctuant or there is a visible pustule would consider doing a bedside decompression however given how firm it is and the fact that is resolving we will hold off on that.      PLAN:   Recommend continuation of IV antibiotics for at least another 24 hours.  No clinical concern at this time for deep infection requiring formal operative treatment.      Candido Briceno M.D.    Department of Orthopaedics  Hand/Upper Extremity  Phone: [phone]  Appt. Phone: [phone]

## 2024-11-24 NOTE — PROGRESS NOTES
Father called  and this MA scheduled Nurse visit for teaching.    Transitional Care Coordination Progress Note:  Plan per Medical/Surgical team: treatment of right forearm cat bite infection with IV ATB, ID consult   Status: Observation  Payor source: medicare A/B  Discharge disposition: Home with daughter   Potential Barriers: awaiting cultures, ?wound care  ADOD: 11/25/2024   KAUSHIK Serrano RN, BSN Transitional Care Coordinator ED# [phone]      11/24/24 0930   Discharge Planning   Living Arrangements Children  (daughter)   Support Systems Children   Assistance Needed ATB plan per ID, Wound care right forearm   Type of Residence Private residence   Number of Stairs to Enter Residence 0   Number of Stairs Within Residence 8   Do you have animals or pets at home? Yes   Type of Animals or Pets cat   Home or Post Acute Services None   Expected Discharge Disposition Home   Does the patient need discharge transport arranged? No   Financial Resource Strain   How hard is it for you to pay for the very basics like food, housing, medical care, and heating? Not hard   Housing Stability   In the last 12 months, was there a time when you were not able to pay the mortgage or rent on time? N   In the past 12 months, how many times have you moved where you were living? 1   At any time in the past 12 months, were you homeless or living in a shelter (including now)? N   Transportation Needs   In the past 12 months, has lack of transportation kept you from medical appointments or from getting medications? no   In the past 12 months, has lack of transportation kept you from meetings, work, or from getting things needed for daily living? No   Patient Choice   Patient / Family choosing to utilize agency / facility established prior to hospitalization No   Stroke Family Assessment   Stroke Family Assessment Needed No   Intensity of Service   Intensity of Service 0-30 min

## 2024-11-24 NOTE — CONSULTS
Inpatient consult to Infectious Diseases  Consult performed by: Chari Guzman DO  Consult ordered by: Kyra Blair, APRN-CNP          Referred by Dr Aram Calderón MD: Nicole Quiles MD    Reason For Consult  Cat bite cellulitis    History Of Present Illness  Sharifa Moore is a 79 y.o. female presenting with worsening right arm redness after a cat bite (her own cat). She notes the cat has been vaccinated    They were playing and the cat bit her forearm. She went to urgent care due to mild redness of the arm, and she was started on cefuroxime/flagyl due to PCN allergy    After 2 days of the abx, the redness worsened. No fevers. +swelling    She was started on IV CTX/metronidazole    She tells me she feels about the same today. +pain in the right arm. Not much better with regards to the redness    Allergy to PCN is hives many years ago, has not tried it since     Past Medical History  She has a past medical history of Allergic, Anemia (2024), Arthritis, Chronic vaginitis (2023), GI bleed (2018), History of arthroplasty of right shoulder (2024), Pain in left hip (2022), Personal history of urinary (tract) infections (2014), Scoliosis, Subacute and chronic vaginitis (2015), Tear of medial meniscus of knee (2023), and Vertigo, benign positional (2023).    Surgical History  She has a past surgical history that includes Colonoscopy (2014);  section, classic (2014);  section, low transverse; [place] tooth extraction; Total shoulder arthroplasty (Right); and Foot surgery (Left).     Social History  She reports that she has never smoked. She has never used smokeless tobacco. She reports that she does not drink alcohol and does not use drugs.   Travel Screening       Question Response    Do you have any of the following new or worsening symptoms? None of these    In the last 10 days, have you been in contact with someone who was  confirmed or suspected to have Coronavirus/COVID-19? No / Unsure    Have you had a COVID-19 viral test in the last 10 days? No    Have you traveled internationally or domestically in the last month? No          Travel History   Travel since 10/24/24    No documented travel since 10/24/24           Family History  Family History   Problem Relation Name Age of Onset    Osteoporosis Mother Anne-Marie Conley     Stroke Mother Anne-Marie Conley     Colon cancer Father Haris Conley     Hypertension Father Haris Conley     Migraines Father Haris Conley     Prostate cancer Father Haris Conley     Arthritis Father Haris Conley     Kidney disease Father Haris Conley     Asthma Daughter Elmira Moore     Diabetes Daughter Elmira Moore     Breast cancer Maternal Grandmother Alena Thomas     Cancer Maternal Grandmother Alena Thomas     Hypertension Other      Cancer Other       Allergies  Ampicillin, Penicillins, and Sulfamethoxazole     Immunization History   Administered Date(s) Administered    Flu vaccine, trivalent, preservative free, HIGH-DOSE, age 65y+ (Fluzone) 10/06/2016, 12/14/2017, 10/01/2020, 10/25/2021, 11/01/2022    Influenza, Unspecified 09/30/2011, 11/01/2022    Influenza, seasonal, injectable 09/30/2011, 01/18/2013, 09/11/2013    Pfizer Gray Cap SARS-CoV-2 05/16/2022    Pfizer Purple Cap SARS-CoV-2 03/01/2021, 03/29/2021, 11/28/2021, 05/16/2022    Pneumococcal polysaccharide vaccine, 23-valent, age 2 years and older (PNEUMOVAX 23) 04/12/2018    Zoster vaccine, recombinant, adult (SHINGRIX) 06/21/2018, 11/14/2019, 12/13/2019    Zoster, live 12/13/2019     Review of Systems  No fever or chills, +right arm pain     Physical Exam  General: AAOx3, NAD, nontoxic appearing  Eyes: PERRL, EOMI, no scleral icterus  ENT: no oral thrush or lesions  CV: regular  Resp:  normal resp effort  Abd: soft, nontender, nondistended, +bowel sounds  : no clinton  Ext: mild RUE edema  Skin: right forearm erythema with  "small area of ?abscess under the puncture wound     Range of Vitals (last 24 hours)  Heart Rate:  [75-91]   Temp:  [36 °C (96.8 °F)-37.1 °C (98.8 °F)]   Resp:  [18]   BP: (129-152)/(59-97)   Height:  [157.5 cm (5' 2\")]   Weight:  [72.6 kg (160 lb)]   SpO2:  [95 %-98 %]     Relevant Results  Lab Results   Component Value Date    WBC 6.1 11/24/2024    HGB 11.8 (L) 11/24/2024    HCT 35.9 (L) 11/24/2024    MCV 90 11/24/2024     11/24/2024      Results from last 72 hours   Lab Units 11/24/24  0422   SODIUM mmol/L 141   POTASSIUM mmol/L 3.9   CHLORIDE mmol/L 110*   CO2 mmol/L 24   BUN mg/dL 15   CREATININE mg/dL 0.62   GLUCOSE mg/dL 100*   CALCIUM mg/dL 8.1*   ANION GAP mmol/L 11   EGFR mL/min/1.73m*2 >90     Results from last 72 hours   Lab Units 11/23/24  1040   ALK PHOS U/L 118   BILIRUBIN TOTAL mg/dL 0.7   PROTEIN TOTAL g/dL 6.2*   ALT U/L 52*   AST U/L 37   ALBUMIN g/dL 4.0     Estimated Creatinine Clearance: 69.8 mL/min (by C-G formula based on SCr of 0.62 mg/dL).  C-Reactive Protein   Date/Time Value Ref Range Status   11/23/2024 10:40 AM 7.11 (H) <1.00 mg/dL Final     Sedimentation Rate   Date/Time Value Ref Range Status   11/23/2024 10:40 AM 21 0 - 30 mm/h Final     No results found for: \"HIV1X2\", \"HIVCONF\", \"LQESAE4ED\"  No results found for: \"HCVPCRQUANT\"    Cultures/Micro  11/23 blood cx: pending     Imaging:  Xray arm  negative    Assessment:  Cat bite with resulting cellulitis of the right arm  PCN allergy    Plan/Recommendations:  Switch CTX/metronidazole to meropenem empirically  Vancomycin for MRSA coverage  Check MRSA screen  May need I&D of the small abscess on the forearm--can monitor over the next day or so  Will follow    Chari Guzman,   [facility]  #[phone]      "

## 2024-11-24 NOTE — PROGRESS NOTES
Sharifa Moore is a 79 y.o. female on day 0 of admission presenting with Right forearm cellulitis.      Subjective   Sharifa was seen and evaluated this morning. Patient lying in bed. States she continues to have discomfort and soreness in her right arm. No fevers or chills, no increasing pain.         Objective     Last Recorded Vitals  /74 (BP Location: Left arm, Patient Position: Lying)   Pulse 78   Temp 36.1 °C (97 °F) (Temporal)   Resp 18   Wt 72.6 kg (160 lb)   SpO2 95%   Intake/Output last 3 Shifts:    Intake/Output Summary (Last 24 hours) at 11/24/2024 1059  Last data filed at 11/24/2024 1027  Gross per 24 hour   Intake 350 ml   Output --   Net 350 ml       Admission Weight  Weight: 72.6 kg (160 lb) (11/23/24 0944)    Daily Weight  11/23/24 : 72.6 kg (160 lb)    Image Results  XR forearm right 2 views  Narrative: STUDY:  Forearm Radiographs; 11/23/2024 at 10:28.  INDICATION:  Evaluate for foreign body.  COMPARISON:  None Available.  ACCESSION NUMBER(S):  [id number]  ORDERING CLINICIAN:  ANABEL DALEY  TECHNIQUE:  two view(s) of the right forearm.  FINDINGS:    There is no displaced fracture.  There are degenerative change the  first carpal metacarpal joint.  No soft tissue abnormality is seen.  Impression: No acute osseous abnormalities.  No evidence of foreign body.  Signed by Isaak Villalba MD      Physical Exam  Vitals reviewed.   Constitutional:       General: She is not in acute distress.     Appearance: She is not ill-appearing.   Cardiovascular:      Rate and Rhythm: Normal rate and regular rhythm.      Heart sounds: Normal heart sounds.   Pulmonary:      Effort: Pulmonary effort is normal. No respiratory distress.      Breath sounds: Normal breath sounds. No wheezing.   Abdominal:      General: Abdomen is flat. Bowel sounds are normal.      Palpations: Abdomen is soft.      Tenderness: There is no abdominal tenderness.   Musculoskeletal:      Right forearm: Swelling and edema  present.      Right lower leg: No edema.      Left lower leg: No edema.      Comments: Right upper extremity with erythema and edema. Erythema has not gone beyond skin marker outline.    Skin:     General: Skin is warm and dry.   Neurological:      Mental Status: She is alert.   Psychiatric:         Mood and Affect: Mood normal.         Behavior: Behavior normal.       Relevant Results    Scheduled medications  atorvastatin, 10 mg, oral, Nightly  cetirizine, 10 mg, oral, Daily  enoxaparin, 40 mg, subcutaneous, q24h  meropenem, 1 g, intravenous, q8h  vancomycin, 1,000 mg, intravenous, q12h      Continuous medications     PRN medications  PRN medications: acetaminophen, vancomycin  Results for orders placed or performed during the hospital encounter of 11/23/24 (from the past 24 hours)   CBC and Auto Differential   Result Value Ref Range    WBC 6.1 4.4 - 11.3 x10*3/uL    nRBC 0.0 0.0 - 0.0 /100 WBCs    RBC 3.97 (L) 4.00 - 5.20 x10*6/uL    Hemoglobin 11.8 (L) 12.0 - 16.0 g/dL    Hematocrit 35.9 (L) 36.0 - 46.0 %    MCV 90 80 - 100 fL    MCH 29.7 26.0 - 34.0 pg    MCHC 32.9 32.0 - 36.0 g/dL    RDW 14.3 11.5 - 14.5 %    Platelets 252 150 - 450 x10*3/uL    Neutrophils % 58.1 40.0 - 80.0 %    Immature Granulocytes %, Automated 0.3 0.0 - 0.9 %    Lymphocytes % 28.4 13.0 - 44.0 %    Monocytes % 10.6 2.0 - 10.0 %    Eosinophils % 2.1 0.0 - 6.0 %    Basophils % 0.5 0.0 - 2.0 %    Neutrophils Absolute 3.51 1.60 - 5.50 x10*3/uL    Immature Granulocytes Absolute, Automated 0.02 0.00 - 0.50 x10*3/uL    Lymphocytes Absolute 1.72 0.80 - 3.00 x10*3/uL    Monocytes Absolute 0.64 0.05 - 0.80 x10*3/uL    Eosinophils Absolute 0.13 0.00 - 0.40 x10*3/uL    Basophils Absolute 0.03 0.00 - 0.10 x10*3/uL   Basic Metabolic Panel   Result Value Ref Range    Glucose 100 (H) 74 - 99 mg/dL    Sodium 141 136 - 145 mmol/L    Potassium 3.9 3.5 - 5.3 mmol/L    Chloride 110 (H) 98 - 107 mmol/L    Bicarbonate 24 21 - 32 mmol/L    Anion Gap 11 10 - 20  mmol/L    Urea Nitrogen 15 6 - 23 mg/dL    Creatinine 0.62 0.50 - 1.05 mg/dL    eGFR >90 >60 mL/min/1.73m*2    Calcium 8.1 (L) 8.6 - 10.3 mg/dL   Magnesium   Result Value Ref Range    Magnesium 2.17 1.60 - 2.40 mg/dL     XR forearm right 2 views    Result Date: 11/23/2024  STUDY: Forearm Radiographs; 11/23/2024 at 10:28. INDICATION: Evaluate for foreign body. COMPARISON: None Available. ACCESSION NUMBER(S): [id number] ORDERING CLINICIAN: ANABEL DALEY TECHNIQUE:  two view(s) of the right forearm. FINDINGS:  There is no displaced fracture.  There are degenerative change the first carpal metacarpal joint.  No soft tissue abnormality is seen.    No acute osseous abnormalities. No evidence of foreign body. Signed by Isaak Villalba MD     Assessment & Plan    Sharifa Moore is a 79 y.o. female with past medical history of osteoarthritis and hyperlipidemia presenting for evaluation of worsening redness to the right forearm. Patient states that on Tuesday, 5 days ago, she was bit by her cat. Her cat is vaccinated, indoor, not aggressive. She states the following day she had some redness and went to urgent care and was started on cefuroxime/Flagyl. She took 2 full days of the antibiotic however the redness has increased, spread to her elbow has increased swelling, and some drainage of clearish bloody fluid began . Leakage resolved in the area scabbed over. Denies any fever.       In the ED, VSS, CMP and CBC unremarkable. Forearm x ray done: no foreign bodies or osseous abnormalities. Patient to be admitted to obs for IV antibiotics, ID consulted and monitor, may need MRI.     Cellulitis of right arm  - Cat bite to right arm, cellulitis localized to right forearm, no s/s of sepsis, WBC 6.1, XR negative for osseous changes   - Failed outpatient antibiotics from urgent care   - Blood cultures pending  - ID consulted and following, started on IV meropenem and Vancomycin, states may need I&D of small abscess  - Dr. Briceno  with hand surgery saw patient, recommend continuation of  IV antibiotics for at least another 24 hours, and states no clinical concern at this time for deep infection requiring formal operative treatment.     Hyperlipidemia   - Continue Home atorvastatin      DVT/GI prophylaxis  -  Continue lovenox  - Continue miralax PRN    Discharge planning  - Patient upgraded to inpatient under Dr. Mendoza, will need continued IV antibiotics.     Discussed plan and lab/testing results with Dr. Christina Rodriges PA-C  11/24/24  2:27 PM

## 2024-11-24 NOTE — PROGRESS NOTES
Vancomycin Dosing by Pharmacy- INITIAL    Sharifa Moore is a 79 y.o. year old female who Pharmacy has been consulted for vancomycin dosing for cellulitis, skin and soft tissue. Based on the patient's indication and renal status this patient will be dosed based on a goal AUC of 400-600.     Renal function is currently stable.    Visit Vitals  /74 (BP Location: Left arm, Patient Position: Lying)   Pulse 78   Temp 36.1 °C (97 °F) (Temporal)   Resp 18        Lab Results   Component Value Date    CREATININE 0.62 2024    CREATININE 0.58 2024    CREATININE 0.62 2024    CREATININE 0.62 2024        Patient weight is as follows:   Vitals:    24 0944   Weight: 72.6 kg (160 lb)       Cultures:  No results found for the encounter in last 14 days.        I/O last 3 completed shifts:  In: 250 (3.4 mL/kg) [IV Piggyback:250]  Out: - (0 mL/kg)   Weight: 72.6 kg   I/O during current shift:  No intake/output data recorded.    Temp (24hrs), Av.6 °C (97.8 °F), Min:36 °C (96.8 °F), Max:37.1 °C (98.8 °F)     Assessment/Plan     Patient will not be given a loading dose.  Will initiate vancomycin maintenance,  1000 mg every 12 hours.    This dosing regimen is predicted by [employer] to result in the following pharmacokinetic parameters:  Regimen: 1000 mg IV every 12 hours.  Start time: 10:04 on 2024  Exposure target: AUC24 (range)400-600 mg/L.hr   SSA53-65: 441 mg/L.hr  AUC24,ss: 561 mg/L.hr  Probability of AUC24 > 400: 84 %  Ctrough,ss: 18 mg/L  Probability of Ctrough,ss > 20: 40 %  Follow-up level has been ordered for tomorrow with morning labs.  Will continue to monitor renal function daily while on vancomycin and order serum creatinine at least every 48 hours if not already ordered.  Follow for continued vancomycin needs, clinical response, and signs/symptoms of toxicity.       Augusta Christopher, PharmD

## 2024-11-24 NOTE — CARE PLAN
The patient's goals for the shift include      The clinical goals for the shift include pt to have no s/s of increased infection throughout the shift      Problem: Pain - Adult  Goal: Verbalizes/displays adequate comfort level or baseline comfort level  Outcome: Progressing     Problem: Safety - Adult  Goal: Free from fall injury  Outcome: Progressing     Problem: Discharge Planning  Goal: Discharge to home or other facility with appropriate resources  Outcome: Progressing     Problem: Chronic Conditions and Co-morbidities  Goal: Patient's chronic conditions and co-morbidity symptoms are monitored and maintained or improved  Outcome: Progressing

## 2024-11-24 NOTE — PROGRESS NOTES
Sharifa Moore is a 79 y.o. female on day 0 of admission presenting with Right forearm cellulitis.    Subjective   Patient seen and examined, patient is transferred out of observation unit to my care since she is made full admission.  79-year-old lady was brought in by her cat and was seen in urgent care about 4 days prior to admission and was prescribed Keflex.  Patient's swelling of the right arm and erythema got worse therefore she came to the emergency room and admitted to observation.  She is afebrile and currently started on meropenem and vancomycin after being seen by infectious disease.  She has a small abscess of her right forearm with surrounding erythema extending about 4 to 5 cm consistent with cellulitis.  No other new complaints.  Past history significant for right shoulder replacement and she gets intra-articular left hip injections.       Objective     Physical Exam  GENERAL:  Alert, mild distress, cooperative  SKIN:  Skin color, texture, turgor normal. No rashes or lesions.  OROPHARYNX:  Lips, mucosa, and tongue are normal.Teeth and gums, normal. Oropharynx normal.  NECK:  No jugulovenous distention, No carotid bruits, Carotid pulse normal contour, Supple  LUNGS:  Lungs clear to auscultation. Good diaphragmatic excursion.  CARDIAC: Rate and rhythm is regular, normal S1 and S2; no rubs, or gallops, 1/6 systolic ejection murmur left sternal border.  ABDOMEN:  Abdomen soft, non-tender, BS normal, No masses or organomegaly  EXTREMETIES: Left forearm area has reddened and erythematous suggestive of cellulitis around 4 cm in area as well as she has a raised area at the cat bite site which has formed a small abscess.  No peripheral edema and peripheral pulses are good  NEURO:  Alert, oriented X 3, Gait not examied. Non-focal. Reflexes normal and symmetric. Sensation grossly intact., Cranial nerves II-XII intact  PULSES:  2+ radial, 2+ carotid    Last Recorded Vitals  Blood pressure 165/84, pulse 86,  "temperature 36.2 °C (97.2 °F), temperature source Temporal, resp. rate 17, height 1.575 m (5' 2\"), weight 72.6 kg (160 lb), SpO2 95%.  Intake/Output last 3 Shifts:  I/O last 3 completed shifts:  In: 250 (3.4 mL/kg) [IV Piggyback:250]  Out: - (0 mL/kg)   Weight: 72.6 kg     Relevant Results  Scheduled medications  atorvastatin, 10 mg, oral, Nightly  cetirizine, 10 mg, oral, Daily  enoxaparin, 40 mg, subcutaneous, q24h  meropenem, 1 g, intravenous, q8h  vancomycin, 1,000 mg, intravenous, q12h      Continuous medications     PRN medications  PRN medications: acetaminophen, polyethylene glycol, vancomycin     Results for orders placed or performed during the hospital encounter of 11/23/24 (from the past 96 hours)   Blood Culture    Specimen: Peripheral Venipuncture; Blood culture   Result Value Ref Range    Blood Culture Loaded on Instrument - Culture in progress    Blood Culture    Specimen: Peripheral Venipuncture; Blood culture   Result Value Ref Range    Blood Culture Loaded on Instrument - Culture in progress    CBC and Auto Differential   Result Value Ref Range    WBC 7.0 4.4 - 11.3 x10*3/uL    nRBC 0.0 0.0 - 0.0 /100 WBCs    RBC 4.41 4.00 - 5.20 x10*6/uL    Hemoglobin 13.2 12.0 - 16.0 g/dL    Hematocrit 39.7 36.0 - 46.0 %    MCV 90 80 - 100 fL    MCH 29.9 26.0 - 34.0 pg    MCHC 33.2 32.0 - 36.0 g/dL    RDW 14.3 11.5 - 14.5 %    Platelets 269 150 - 450 x10*3/uL    Neutrophils % 68.9 40.0 - 80.0 %    Immature Granulocytes %, Automated 0.4 0.0 - 0.9 %    Lymphocytes % 20.7 13.0 - 44.0 %    Monocytes % 8.0 2.0 - 10.0 %    Eosinophils % 1.4 0.0 - 6.0 %    Basophils % 0.6 0.0 - 2.0 %    Neutrophils Absolute 4.80 1.60 - 5.50 x10*3/uL    Immature Granulocytes Absolute, Automated 0.03 0.00 - 0.50 x10*3/uL    Lymphocytes Absolute 1.44 0.80 - 3.00 x10*3/uL    Monocytes Absolute 0.56 0.05 - 0.80 x10*3/uL    Eosinophils Absolute 0.10 0.00 - 0.40 x10*3/uL    Basophils Absolute 0.04 0.00 - 0.10 x10*3/uL   Comprehensive " metabolic panel   Result Value Ref Range    Glucose 100 (H) 74 - 99 mg/dL    Sodium 142 136 - 145 mmol/L    Potassium 3.8 3.5 - 5.3 mmol/L    Chloride 109 (H) 98 - 107 mmol/L    Bicarbonate 25 21 - 32 mmol/L    Anion Gap 12 10 - 20 mmol/L    Urea Nitrogen 12 6 - 23 mg/dL    Creatinine 0.58 0.50 - 1.05 mg/dL    eGFR >90 >60 mL/min/1.73m*2    Calcium 8.8 8.6 - 10.3 mg/dL    Albumin 4.0 3.4 - 5.0 g/dL    Alkaline Phosphatase 118 33 - 136 U/L    Total Protein 6.2 (L) 6.4 - 8.2 g/dL    AST 37 9 - 39 U/L    Bilirubin, Total 0.7 0.0 - 1.2 mg/dL    ALT 52 (H) 7 - 45 U/L   Sedimentation rate, automated   Result Value Ref Range    Sedimentation Rate 21 0 - 30 mm/h   C-reactive protein   Result Value Ref Range    C-Reactive Protein 7.11 (H) <1.00 mg/dL   CBC and Auto Differential   Result Value Ref Range    WBC 6.1 4.4 - 11.3 x10*3/uL    nRBC 0.0 0.0 - 0.0 /100 WBCs    RBC 3.97 (L) 4.00 - 5.20 x10*6/uL    Hemoglobin 11.8 (L) 12.0 - 16.0 g/dL    Hematocrit 35.9 (L) 36.0 - 46.0 %    MCV 90 80 - 100 fL    MCH 29.7 26.0 - 34.0 pg    MCHC 32.9 32.0 - 36.0 g/dL    RDW 14.3 11.5 - 14.5 %    Platelets 252 150 - 450 x10*3/uL    Neutrophils % 58.1 40.0 - 80.0 %    Immature Granulocytes %, Automated 0.3 0.0 - 0.9 %    Lymphocytes % 28.4 13.0 - 44.0 %    Monocytes % 10.6 2.0 - 10.0 %    Eosinophils % 2.1 0.0 - 6.0 %    Basophils % 0.5 0.0 - 2.0 %    Neutrophils Absolute 3.51 1.60 - 5.50 x10*3/uL    Immature Granulocytes Absolute, Automated 0.02 0.00 - 0.50 x10*3/uL    Lymphocytes Absolute 1.72 0.80 - 3.00 x10*3/uL    Monocytes Absolute 0.64 0.05 - 0.80 x10*3/uL    Eosinophils Absolute 0.13 0.00 - 0.40 x10*3/uL    Basophils Absolute 0.03 0.00 - 0.10 x10*3/uL   Basic Metabolic Panel   Result Value Ref Range    Glucose 100 (H) 74 - 99 mg/dL    Sodium 141 136 - 145 mmol/L    Potassium 3.9 3.5 - 5.3 mmol/L    Chloride 110 (H) 98 - 107 mmol/L    Bicarbonate 24 21 - 32 mmol/L    Anion Gap 11 10 - 20 mmol/L    Urea Nitrogen 15 6 - 23 mg/dL     Creatinine 0.62 0.50 - 1.05 mg/dL    eGFR >90 >60 mL/min/1.73m*2    Calcium 8.1 (L) 8.6 - 10.3 mg/dL   Magnesium   Result Value Ref Range    Magnesium 2.17 1.60 - 2.40 mg/dL    XR forearm right 2 views    Result Date: 11/23/2024  STUDY: Forearm Radiographs; 11/23/2024 at 10:28. INDICATION: Evaluate for foreign body. COMPARISON: None Available. ACCESSION NUMBER(S): [id number] ORDERING CLINICIAN: ANABEL DALEY TECHNIQUE:  two view(s) of the right forearm. FINDINGS:  There is no displaced fracture.  There are degenerative change the first carpal metacarpal joint.  No soft tissue abnormality is seen.    No acute osseous abnormalities. No evidence of foreign body. Signed by Isaak Villalba MD               Assessment/Plan   Assessment & Plan  Right forearm cellulitis    79-year-old female with previous history of hyperlipidemia otherwise good health, previous history of right shoulder replacement and left hip arthritis for which she gets intra-articular injections is admitted after a cat bite and has cellulitis of the right forearm with a small abscess of the forearm.  Her white cell count is normal her CRP is mildly elevated at 7.1 and sedimentation rate is normal.  Patient is started on IV meropenem and vancomycin pending cultures, will observe closely may need I&D of the small abscess if no improvement.  Infectious disease on the board.    Patient was admitted to observation unit initially and transferred to my care since she is a full admit now.    1.  Cat bite leading to cellulitis of the right forearm with a small abscess  Continue IV antibiotics as above including meropenem and vancomycin pending culture reports.  Will observe and may need I&D of the small abscess right forearm.    2.  Hyperlipidemia continue with atorvastatin    3.  Mild hypertension will observe currently not on any medication    4.  History of right shoulder replacement in the past and left hip arthritis for which she gets intra-articular  injections.          DVT prophylaxis, reviewed all labs and imaging studies discussed the plan of treatment with patient in detail and all question answered.    Total time spent in examination counseling coordinating care for this patient was greater than 55 minutes.     I spent 35 minutes in the professional and overall care of this patient.      Charles Mendoza MD

## 2024-11-25 VITALS
OXYGEN SATURATION: 94 % | TEMPERATURE: 97.2 F | SYSTOLIC BLOOD PRESSURE: 141 MMHG | RESPIRATION RATE: 17 BRPM | WEIGHT: 160 LBS | HEIGHT: 62 IN | HEART RATE: 89 BPM | DIASTOLIC BLOOD PRESSURE: 72 MMHG | BODY MASS INDEX: 29.44 KG/M2

## 2024-11-25 LAB
ANION GAP SERPL CALC-SCNC: 11 MMOL/L (ref 10–20)
BASOPHILS # BLD AUTO: 0.05 X10*3/UL (ref 0–0.1)
BASOPHILS NFR BLD AUTO: 0.6 %
BUN SERPL-MCNC: 13 MG/DL (ref 6–23)
CALCIUM SERPL-MCNC: 8.2 MG/DL (ref 8.6–10.3)
CHLORIDE SERPL-SCNC: 107 MMOL/L (ref 98–107)
CO2 SERPL-SCNC: 24 MMOL/L (ref 21–32)
CREAT SERPL-MCNC: 0.57 MG/DL (ref 0.5–1.05)
EGFRCR SERPLBLD CKD-EPI 2021: >90 ML/MIN/1.73M*2
EOSINOPHIL # BLD AUTO: 0.23 X10*3/UL (ref 0–0.4)
EOSINOPHIL NFR BLD AUTO: 2.9 %
ERYTHROCYTE [DISTWIDTH] IN BLOOD BY AUTOMATED COUNT: 14.3 % (ref 11.5–14.5)
GLUCOSE SERPL-MCNC: 103 MG/DL (ref 74–99)
HCT VFR BLD AUTO: 38.9 % (ref 36–46)
HGB BLD-MCNC: 12.7 G/DL (ref 12–16)
IMM GRANULOCYTES # BLD AUTO: 0.03 X10*3/UL (ref 0–0.5)
IMM GRANULOCYTES NFR BLD AUTO: 0.4 % (ref 0–0.9)
LYMPHOCYTES # BLD AUTO: 2.25 X10*3/UL (ref 0.8–3)
LYMPHOCYTES NFR BLD AUTO: 28.8 %
MAGNESIUM SERPL-MCNC: 2.08 MG/DL (ref 1.6–2.4)
MCH RBC QN AUTO: 29.3 PG (ref 26–34)
MCHC RBC AUTO-ENTMCNC: 32.6 G/DL (ref 32–36)
MCV RBC AUTO: 90 FL (ref 80–100)
MONOCYTES # BLD AUTO: 0.71 X10*3/UL (ref 0.05–0.8)
MONOCYTES NFR BLD AUTO: 9.1 %
NEUTROPHILS # BLD AUTO: 4.54 X10*3/UL (ref 1.6–5.5)
NEUTROPHILS NFR BLD AUTO: 58.2 %
NRBC BLD-RTO: 0 /100 WBCS (ref 0–0)
PLATELET # BLD AUTO: 289 X10*3/UL (ref 150–450)
POTASSIUM SERPL-SCNC: 3.9 MMOL/L (ref 3.5–5.3)
RBC # BLD AUTO: 4.33 X10*6/UL (ref 4–5.2)
SODIUM SERPL-SCNC: 138 MMOL/L (ref 136–145)
VANCOMYCIN SERPL-MCNC: 15.5 UG/ML (ref 5–20)
WBC # BLD AUTO: 7.8 X10*3/UL (ref 4.4–11.3)

## 2024-11-25 PROCEDURE — 80048 BASIC METABOLIC PNL TOTAL CA: CPT | Performed by: NURSE PRACTITIONER

## 2024-11-25 PROCEDURE — 36415 COLL VENOUS BLD VENIPUNCTURE: CPT | Performed by: NURSE PRACTITIONER

## 2024-11-25 PROCEDURE — 85025 COMPLETE CBC W/AUTO DIFF WBC: CPT | Performed by: NURSE PRACTITIONER

## 2024-11-25 PROCEDURE — 99239 HOSP IP/OBS DSCHRG MGMT >30: CPT | Performed by: INTERNAL MEDICINE

## 2024-11-25 PROCEDURE — 83735 ASSAY OF MAGNESIUM: CPT | Performed by: NURSE PRACTITIONER

## 2024-11-25 PROCEDURE — 2500000004 HC RX 250 GENERAL PHARMACY W/ HCPCS (ALT 636 FOR OP/ED): Performed by: STUDENT IN AN ORGANIZED HEALTH CARE EDUCATION/TRAINING PROGRAM

## 2024-11-25 PROCEDURE — 80202 ASSAY OF VANCOMYCIN: CPT | Performed by: STUDENT IN AN ORGANIZED HEALTH CARE EDUCATION/TRAINING PROGRAM

## 2024-11-25 PROCEDURE — 2500000001 HC RX 250 WO HCPCS SELF ADMINISTERED DRUGS (ALT 637 FOR MEDICARE OP): Performed by: NURSE PRACTITIONER

## 2024-11-25 RX ORDER — DOXYCYCLINE 100 MG/1
100 CAPSULE ORAL 2 TIMES DAILY
Qty: 14 CAPSULE | Refills: 0 | Status: SHIPPED | OUTPATIENT
Start: 2024-11-25 | End: 2024-12-04 | Stop reason: ALTCHOICE

## 2024-11-25 RX ORDER — CEFDINIR 300 MG/1
300 CAPSULE ORAL 2 TIMES DAILY
Qty: 14 CAPSULE | Refills: 0 | Status: SHIPPED | OUTPATIENT
Start: 2024-11-25 | End: 2024-12-04 | Stop reason: ALTCHOICE

## 2024-11-25 RX ADMIN — CETIRIZINE HYDROCHLORIDE 10 MG: 10 TABLET, FILM COATED ORAL at 08:28

## 2024-11-25 RX ADMIN — ACETAMINOPHEN 650 MG: 325 TABLET, FILM COATED ORAL at 12:33

## 2024-11-25 RX ADMIN — MEROPENEM 1 G: 1 INJECTION, POWDER, FOR SOLUTION INTRAVENOUS at 06:45

## 2024-11-25 RX ADMIN — VANCOMYCIN HYDROCHLORIDE 1000 MG: 1 INJECTION, SOLUTION INTRAVENOUS at 10:12

## 2024-11-25 RX ADMIN — MEROPENEM 1 G: 1 INJECTION, POWDER, FOR SOLUTION INTRAVENOUS at 14:47

## 2024-11-25 ASSESSMENT — COGNITIVE AND FUNCTIONAL STATUS - GENERAL
DAILY ACTIVITIY SCORE: 24
MOBILITY SCORE: 24
DAILY ACTIVITIY SCORE: 24
MOBILITY SCORE: 24
MOBILITY SCORE: 24
DAILY ACTIVITIY SCORE: 24

## 2024-11-25 ASSESSMENT — PAIN DESCRIPTION - LOCATION: LOCATION: HIP

## 2024-11-25 ASSESSMENT — PAIN SCALES - GENERAL
PAINLEVEL_OUTOF10: 0 - NO PAIN
PAINLEVEL_OUTOF10: 2
PAINLEVEL_OUTOF10: 3

## 2024-11-25 ASSESSMENT — PAIN - FUNCTIONAL ASSESSMENT
PAIN_FUNCTIONAL_ASSESSMENT: 0-10
PAIN_FUNCTIONAL_ASSESSMENT: 0-10

## 2024-11-25 ASSESSMENT — PAIN DESCRIPTION - ORIENTATION: ORIENTATION: LEFT

## 2024-11-25 NOTE — CARE PLAN
The patient's goals for the shift include no adverse reactions from antibiotics    The clinical goals for the shift include decrease edema on right arm      Problem: Pain - Adult  Goal: Verbalizes/displays adequate comfort level or baseline comfort level  Outcome: Progressing     Problem: Safety - Adult  Goal: Free from fall injury  Outcome: Progressing     Problem: Discharge Planning  Goal: Discharge to home or other facility with appropriate resources  Outcome: Progressing     Problem: Chronic Conditions and Co-morbidities  Goal: Patient's chronic conditions and co-morbidity symptoms are monitored and maintained or improved  Outcome: Progressing

## 2024-11-25 NOTE — PROGRESS NOTES
Vancomycin Dosing by Pharmacy- FOLLOW UP    Sharifa Moore is a 79 y.o. year old female who Pharmacy has been consulted for vancomycin dosing for cellulitis, skin and soft tissue. Based on the patient's indication and renal status this patient is being dosed based on a goal AUC of 400-600.     Renal function is currently stable.    Current vancomycin dose: 1000 mg given every 12 hours    Estimated vancomycin AUC on current dose: 574 mg/L.hr     Visit Vitals  /74 (BP Location: Left arm, Patient Position: Lying)   Pulse 69   Temp 36.1 °C (97 °F) (Temporal)   Resp 18        Lab Results   Component Value Date    CREATININE 0.57 2024    CREATININE 0.62 2024    CREATININE 0.58 2024    CREATININE 0.62 2024        Patient weight is as follows:   Vitals:    24 0944   Weight: 72.6 kg (160 lb)       Cultures:  No results found for the encounter in last 14 days.       I/O last 3 completed shifts:  In: 650 (9 mL/kg) [IV Piggyback:650]  Out: - (0 mL/kg)   Weight: 72.6 kg   I/O during current shift:  I/O this shift:  In: 200 [IV Piggyback:200]  Out: -     Temp (24hrs), Av.1 °C (97 °F), Min:35.9 °C (96.6 °F), Max:36.2 °C (97.2 °F)      Assessment/Plan    Within goal AUC range. Continue current vancomycin regimen.    This dosing regimen is predicted by [employer] to result in the following pharmacokinetic parameters:    WAW46-30: 529 mg/L.hr  AUC24,ss: 574 mg/L.hr  Probability of AUC24 > 400: 96 %  Ctrough,ss: 17.8 mg/L  Probability of Ctrough,ss > 20: 35 %    The next level will be obtained on  at 05:00. May be obtained sooner if clinically indicated.   Will continue to monitor renal function daily while on vancomycin and order serum creatinine at least every 48 hours if not already ordered.  Follow for continued vancomycin needs, clinical response, and signs/symptoms of toxicity.     Monica Barahona, PharmD

## 2024-11-25 NOTE — PROGRESS NOTES
"Sharifa Moore is a 79 y.o. female on day 1 of admission presenting with Right forearm cellulitis.    Subjective   NAEO, AFVSS, reports erythema improved to forearm. Denies n/t, weakness, f/c.,       Objective     Constitutional: Comfortable, NAD  HEENT: hearing and vision grossly intact, MMM  Resp: breathing comfortably   CV: extremities warm, well perfused  GI: abd soft  Neuro: AAOx3, sensory and motor grossly intact  Psych: Appropriate mood and affect  MSK: RUE  - small 1x1cm area of swelling with central scab, surrounding erythema improved.   - no fluctuance  - motor/sensation intact me/ul/ra dist  - palpable radial pulse    Last Recorded Vitals  Blood pressure 111/74, pulse 69, temperature 36.1 °C (97 °F), temperature source Temporal, resp. rate 18, height 1.575 m (5' 2\"), weight 72.6 kg (160 lb), SpO2 95%.  Intake/Output last 3 Shifts:  I/O last 3 completed shifts:  In: 900 (12.4 mL/kg) [IV Piggyback:900]  Out: - (0 mL/kg)   Weight: 72.6 kg     Relevant Results      Scheduled medications  atorvastatin, 10 mg, oral, Nightly  cetirizine, 10 mg, oral, Daily  enoxaparin, 40 mg, subcutaneous, q24h  meropenem, 1 g, intravenous, q8h  vancomycin, 1,000 mg, intravenous, q12h      Continuous medications     PRN medications  PRN medications: acetaminophen, polyethylene glycol, vancomycin  Results for orders placed or performed during the hospital encounter of 11/23/24 (from the past 24 hours)   CBC and Auto Differential   Result Value Ref Range    WBC 7.8 4.4 - 11.3 x10*3/uL    nRBC 0.0 0.0 - 0.0 /100 WBCs    RBC 4.33 4.00 - 5.20 x10*6/uL    Hemoglobin 12.7 12.0 - 16.0 g/dL    Hematocrit 38.9 36.0 - 46.0 %    MCV 90 80 - 100 fL    MCH 29.3 26.0 - 34.0 pg    MCHC 32.6 32.0 - 36.0 g/dL    RDW 14.3 11.5 - 14.5 %    Platelets 289 150 - 450 x10*3/uL    Neutrophils % 58.2 40.0 - 80.0 %    Immature Granulocytes %, Automated 0.4 0.0 - 0.9 %    Lymphocytes % 28.8 13.0 - 44.0 %    Monocytes % 9.1 2.0 - 10.0 %    Eosinophils % " 2.9 0.0 - 6.0 %    Basophils % 0.6 0.0 - 2.0 %    Neutrophils Absolute 4.54 1.60 - 5.50 x10*3/uL    Immature Granulocytes Absolute, Automated 0.03 0.00 - 0.50 x10*3/uL    Lymphocytes Absolute 2.25 0.80 - 3.00 x10*3/uL    Monocytes Absolute 0.71 0.05 - 0.80 x10*3/uL    Eosinophils Absolute 0.23 0.00 - 0.40 x10*3/uL    Basophils Absolute 0.05 0.00 - 0.10 x10*3/uL   Basic Metabolic Panel   Result Value Ref Range    Glucose 103 (H) 74 - 99 mg/dL    Sodium 138 136 - 145 mmol/L    Potassium 3.9 3.5 - 5.3 mmol/L    Chloride 107 98 - 107 mmol/L    Bicarbonate 24 21 - 32 mmol/L    Anion Gap 11 10 - 20 mmol/L    Urea Nitrogen 13 6 - 23 mg/dL    Creatinine 0.57 0.50 - 1.05 mg/dL    eGFR >90 >60 mL/min/1.73m*2    Calcium 8.2 (L) 8.6 - 10.3 mg/dL   Magnesium   Result Value Ref Range    Magnesium 2.08 1.60 - 2.40 mg/dL   Vancomycin   Result Value Ref Range    Vancomycin 15.5 5.0 - 20.0 ug/mL                            Assessment/Plan   Assessment & Plan  Right forearm cellulitis    79F with right forearm cellulitis after cat bite.   - no acute ortho surgical intervention  - continue abx per primary; anticipate transition to PO regimen today  - ok to DC from ortho standpoint; follow up with Dr Briceno as scheduled  - remainder per primary           Deangelo Manning MD

## 2024-11-25 NOTE — PROGRESS NOTES
11/25/24 1249   Discharge Planning   Type of Animals or Pets CAT   Home or Post Acute Services None   Expected Discharge Disposition Home     ID following.  Pending ultrasound of right forearm.  Possible I&d.  Patient receiving IV anitbiotics.  Plan remains for patient to return home with her daughter upon discharge.  No home going needs identified at this time. Will continue to follow for discharge planning needs.

## 2024-11-25 NOTE — NURSING NOTE
Discharge instructions given by discharge nurse. All questions answered. IV taken out. All pt belongings left with pt. Pt wheeled to main entrance by RENA Rivera

## 2024-11-25 NOTE — PROGRESS NOTES
"  INFECTIOUS DISEASE DAILY PROGRESS NOTE    SUBJECTIVE:    No new issues. Pain improving. No fever.    OBJECTIVE:  VITALS (Last 24 Hours)  /83 (BP Location: Left arm, Patient Position: Lying)   Pulse 82   Temp 36.2 °C (97.2 °F) (Temporal)   Resp 17   Ht 1.575 m (5' 2\")   Wt 72.6 kg (160 lb)   SpO2 92%   BMI 29.26 kg/m²     PHYSICAL EXAM:  Gen - NAD  Right Forearm - small area of initial bite with focal swelling but doesn't seem like there is much to drain here, overall erythema is better    ABX: IV Vanc/Sebastien    LABS:  Lab Results   Component Value Date    WBC 7.8 11/25/2024    HGB 12.7 11/25/2024    HCT 38.9 11/25/2024    MCV 90 11/25/2024     11/25/2024     Lab Results   Component Value Date    GLUCOSE 103 (H) 11/25/2024    CALCIUM 8.2 (L) 11/25/2024     11/25/2024    K 3.9 11/25/2024    CO2 24 11/25/2024     11/25/2024    BUN 13 11/25/2024    CREATININE 0.57 11/25/2024     Results from last 72 hours   Lab Units 11/23/24  1040   ALK PHOS U/L 118   BILIRUBIN TOTAL mg/dL 0.7   PROTEIN TOTAL g/dL 6.2*   ALT U/L 52*   AST U/L 37   ALBUMIN g/dL 4.0     Estimated Creatinine Clearance: 75.9 mL/min (by C-G formula based on SCr of 0.57 mg/dL).    C-Reactive Protein   Date/Time Value Ref Range Status   11/23/2024 10:40 AM 7.11 (H) <1.00 mg/dL Final       ASSESSMENT/PLAN:    Right Forearm Cellulitis related to cat bite    OK to go home with Po Doxy 100mg BID and PO Cefdinir 300mg BID for 1 more week.    Will sign off. Please call back with questions. Thanks!    Chu Peña MD  [facility]  Office #[phone]      "

## 2024-11-25 NOTE — DISCHARGE SUMMARY
ADMISSION DATE: 11/23/2024     DISCHARGE DATE:  11/25/24     Discharge Diagnosis  Right forearm cellulitis secondary to cat bite.    Issues Requiring Follow-Up  Follow-up with PCP 1 week.        Discharge Meds     Your medication list        START taking these medications        Instructions Last Dose Given Next Dose Due   cefdinir 300 mg capsule  Commonly known as: Omnicef      Take 1 capsule (300 mg) by mouth 2 times a day for 7 days.       doxycycline 100 mg capsule  Commonly known as: Vibramycin      Take 1 capsule (100 mg) by mouth 2 times a day for 7 days. Take with at least 8 ounces (large glass) of water, do not lie down for 30 minutes after              CHANGE how you take these medications        Instructions Last Dose Given Next Dose Due   atorvastatin 10 mg tablet  Commonly known as: Lipitor  What changed: when to take this      Take 1 tablet (10 mg) by mouth once daily.              CONTINUE taking these medications        Instructions Last Dose Given Next Dose Due   clindamycin 300 mg capsule  Commonly known as: Cleocin           fexofenadine 180 mg tablet  Commonly known as: Allegra           ibuprofen 800 mg tablet      Take 1 tablet (800 mg) by mouth every 8 hours if needed for moderate pain (4 - 6).       Tylenol Arthritis Pain 650 mg ER tablet  Generic drug: acetaminophen                     Where to Get Your Medications        These medications were sent to [facility]/pharmacy #[id number] - [place], [place] [address] AT [place] [address], [place] [address]      Phone: [phone]   cefdinir 300 mg capsule  doxycycline 100 mg capsule             Results for orders placed or performed during the hospital encounter of 11/23/24 (from the past 24 hours)   CBC and Auto Differential   Result Value Ref Range    WBC 7.8 4.4 - 11.3 x10*3/uL    nRBC 0.0 0.0 - 0.0 /100 WBCs    RBC 4.33 4.00 - 5.20 x10*6/uL    Hemoglobin 12.7 12.0 - 16.0 g/dL    Hematocrit 38.9 36.0 - 46.0 %    MCV 90 80 - 100  fL    MCH 29.3 26.0 - 34.0 pg    MCHC 32.6 32.0 - 36.0 g/dL    RDW 14.3 11.5 - 14.5 %    Platelets 289 150 - 450 x10*3/uL    Neutrophils % 58.2 40.0 - 80.0 %    Immature Granulocytes %, Automated 0.4 0.0 - 0.9 %    Lymphocytes % 28.8 13.0 - 44.0 %    Monocytes % 9.1 2.0 - 10.0 %    Eosinophils % 2.9 0.0 - 6.0 %    Basophils % 0.6 0.0 - 2.0 %    Neutrophils Absolute 4.54 1.60 - 5.50 x10*3/uL    Immature Granulocytes Absolute, Automated 0.03 0.00 - 0.50 x10*3/uL    Lymphocytes Absolute 2.25 0.80 - 3.00 x10*3/uL    Monocytes Absolute 0.71 0.05 - 0.80 x10*3/uL    Eosinophils Absolute 0.23 0.00 - 0.40 x10*3/uL    Basophils Absolute 0.05 0.00 - 0.10 x10*3/uL   Basic Metabolic Panel   Result Value Ref Range    Glucose 103 (H) 74 - 99 mg/dL    Sodium 138 136 - 145 mmol/L    Potassium 3.9 3.5 - 5.3 mmol/L    Chloride 107 98 - 107 mmol/L    Bicarbonate 24 21 - 32 mmol/L    Anion Gap 11 10 - 20 mmol/L    Urea Nitrogen 13 6 - 23 mg/dL    Creatinine 0.57 0.50 - 1.05 mg/dL    eGFR >90 >60 mL/min/1.73m*2    Calcium 8.2 (L) 8.6 - 10.3 mg/dL   Magnesium   Result Value Ref Range    Magnesium 2.08 1.60 - 2.40 mg/dL   Vancomycin   Result Value Ref Range    Vancomycin 15.5 5.0 - 20.0 ug/mL    XR forearm right 2 views    Result Date: 11/23/2024  STUDY: Forearm Radiographs; 11/23/2024 at 10:28. INDICATION: Evaluate for foreign body. COMPARISON: None Available. ACCESSION NUMBER(S): [id number] ORDERING CLINICIAN: ANABEL DALEY TECHNIQUE:  two view(s) of the right forearm. FINDINGS:  There is no displaced fracture.  There are degenerative change the first carpal metacarpal joint.  No soft tissue abnormality is seen.    No acute osseous abnormalities. No evidence of foreign body. Signed by Isaak Villalba MD          Hospital Course   Right forearm cellulitis     79-year-old female with previous history of hyperlipidemia otherwise good health, previous history of right shoulder replacement and left hip arthritis for which she gets  "intra-articular injections is admitted after a cat bite and has cellulitis of the right forearm with a small abscess of the forearm.  Her white cell count is normal her CRP is mildly elevated at 7.1 and sedimentation rate is normal.  Patient is started on IV meropenem and vancomycin pending cultures, will observe closely may need I&D of the small abscess if no improvement.  Infectious disease on the board.     Patient was  initially admitted to observation unit initially and transferred to my care .     1.  Cat bite leading to cellulitis of the right forearm with a small abscess  On IV antibiotics as above including meropenem and vancomycin pending culture reports.  Significant improvement, seen by infectious disease and antibiotics can be changed to p.o. doxycycline and cefdinir for 1 week and stable for discharge home today.     2.  Hyperlipidemia continue with atorvastatin     3.  Mild hypertension will observe currently not on any medication     4.  History of right shoulder replacement in the past and left hip arthritis for which she gets intra-articular injections.     Stable for discharge home today, total time spent in discharging this patient was greater than 35 minutes.        Pertinent Physical Exam At Time of Discharge  Visit Vitals  /83 (BP Location: Left arm, Patient Position: Lying)   Pulse 82   Temp 36.2 °C (97.2 °F) (Temporal)   Resp 17   Ht 1.575 m (5' 2\")   Wt 72.6 kg (160 lb)   SpO2 92%   BMI 29.26 kg/m²   OB Status Postmenopausal   Smoking Status Never   BSA 1.78 m²    GENERAL:  Alert, mild distress, cooperative  SKIN:  Skin color, texture, turgor normal. No rashes or lesions.  OROPHARYNX:  Lips, mucosa, and tongue are normal.Teeth and gums, normal. Oropharynx normal.  NECK:  No jugulovenous distention, No carotid bruits, Carotid pulse normal contour, Supple  LUNGS:  Lungs clear to auscultation. Good diaphragmatic excursion.  CARDIAC: Rate and rhythm is regular, normal S1 and S2; no rubs, " or gallops, 1/6 systolic ejection murmur left sternal border.  ABDOMEN:  Abdomen soft, non-tender, BS normal, No masses or organomegaly  EXTREMETIES: Left forearm area has reddened and erythematous suggestive of cellulitis   No peripheral edema and peripheral pulses are good  NEURO:  Alert, oriented X 3, Gait not examied. Non-focal. Reflexes normal and symmetric. Sensation grossly intact., Cranial nerves II-XII intact  PULSES:  2+ radial, 2+ carotid        Outpatient Follow-Up  Future Appointments   Date Time Provider Department Center   12/6/2024 10:00 AM DO KEENA [id number]   12/13/2024 10:20 AM DO KEENA [id number]         Charles Mendoza MD

## 2024-11-26 ENCOUNTER — PATIENT OUTREACH (OUTPATIENT)
Dept: PRIMARY CARE | Facility: CLINIC | Age: 79
End: 2024-11-26
Payer: MEDICARE

## 2024-11-26 DIAGNOSIS — Z09 HOSPITAL DISCHARGE FOLLOW-UP: ICD-10-CM

## 2024-11-26 DIAGNOSIS — L03.113 RIGHT FOREARM CELLULITIS: ICD-10-CM

## 2024-11-26 NOTE — PROGRESS NOTES
TCM completed 11/26/24   Discharge Facility:  [facility]  Discharge Diagnosis: Right forearm cellulitis secondary to cat bite.   Admission Date: 11/23/24  Discharge Date: 11/25/24    PCP Appointment Date: Message to office as there are no available appts. Needs seen by: 12/10/24.   Specialist Appointment Date: Ortho surgeon- 12/6/24 and 12/13/24  Hospital Encounter and Summary Linked: Yes                 --See discharge assessment below for further details--  Engagement  Call Start Time: 1417 (11/26/2024  2:17 PM)    Medications  Medications reviewed with patient/caregiver?: Yes (11/26/2024  2:17 PM)  Is the patient having any side effects they believe may be caused by any medication additions or changes?: No (11/26/2024  2:17 PM)  Does the patient have all medications ordered at discharge?: Yes (11/26/2024  2:17 PM)  Care Management Interventions: No intervention needed; Provided patient education (11/26/2024  2:17 PM)  Prescription Comments: Start: Doxycycline 100mg BID and PO Cefdinir 300mg BID for 1 more week. (11/26/2024  2:17 PM)  Is the patient taking all medications as directed (includes completed medication regime)?: Yes (11/26/2024  2:17 PM)  Care Management Interventions: Provided patient education (11/26/2024  2:17 PM)  Medication Comments: See medication list. Picked up from [facility] pharmacy after discharge. (Patient reports taking both antibiotics at the same time and developing a headache shortly afterwards. Denied any other s/s. Advised of possible SE of both ATB and to take 1-2 hours apart to see if that helps. Pt to call back if headache persists.) (11/26/2024  2:17 PM)    Appointments  Does the patient have a primary care provider?: Yes (11/26/2024  2:17 PM)  Care Management Interventions: Educated patient on importance of making appointment; Advised patient to make appointment (11/26/2024  2:17 PM)  Has the patient kept scheduled appointments due by today?: Not applicable (11/26/2024  2:17 PM)  Care  "Management Interventions: Advised to schedule with specialist (11/26/2024  2:17 PM)    Self Management  What is the home health agency?: n/a (11/26/2024  2:17 PM)  Has home health visited the patient within 72 hours of discharge?: Not applicable (11/26/2024  2:17 PM)  What Durable Medical Equipment (DME) was ordered?: n/a (11/26/2024  2:17 PM)    Patient Teaching  Does the patient have access to their discharge instructions?: Yes (11/26/2024  2:17 PM)  Care Management Interventions: Reviewed instructions with patient; Educated on MyChart (11/26/2024  2:17 PM)  What is the patient's perception of their health status since discharge?: Improving (11/26/2024  2:17 PM)  Is the patient/caregiver able to teach back the hierarchy of who to call/visit for symptoms/problems? PCP, Specialist, Home Health nurse, Urgent Care, ED, 911: Yes (11/26/2024  2:17 PM)  Patient/Caregiver Education Comments: Spoke with the patient who states she is \" doing okay, just resting\" today. Patient denied any acute changes/concerns in her condition since leaving the hospital. Patient denied needing any assistance in the home. Discussed discharge summary and all activity/ wound care instructions given- patient had no questions/concerns. (11/26/2024  2:17 PM)    Wrap Up  Wrap Up Additional Comments: TCM initial outreach post discharge completed successfully. Patient confirmed she received her discharge summary and has all medications needed in the home. Patient denied any questions/concerns regarding medication changes or her hospital stay.  Patient denied need for DME, HHA or assistance obtaining transportation.  TCM phone number was provided to the patient, with the patient encouraged to call back with any non-emergent questions or concerns. Patient verbalized her understanding and states she has none, but will call back if needed. Unable to schedule the patient for a PCP follow up appt as there are no available appts- tasked to office for " assistance. (11/26/2024  2:17 PM)  Call End Time: 1420 (11/26/2024  2:17 PM)

## 2024-11-27 LAB — BACTERIA BLD CULT: NORMAL

## 2024-11-28 LAB — BACTERIA BLD CULT: NORMAL

## 2024-12-04 ENCOUNTER — OFFICE VISIT (OUTPATIENT)
Dept: PRIMARY CARE | Facility: CLINIC | Age: 79
End: 2024-12-04
Payer: MEDICARE

## 2024-12-04 VITALS — HEART RATE: 80 BPM | WEIGHT: 157.8 LBS | BODY MASS INDEX: 28.86 KG/M2

## 2024-12-04 DIAGNOSIS — L03.113 RIGHT FOREARM CELLULITIS: Primary | ICD-10-CM

## 2024-12-04 PROBLEM — K57.92 ACUTE DIVERTICULITIS: Status: RESOLVED | Noted: 2024-06-01 | Resolved: 2024-12-04

## 2024-12-04 PROBLEM — K57.92 ACUTE DIVERTICULITIS: Status: ACTIVE | Noted: 2024-06-01

## 2024-12-04 PROCEDURE — 1036F TOBACCO NON-USER: CPT | Performed by: INTERNAL MEDICINE

## 2024-12-04 PROCEDURE — 1160F RVW MEDS BY RX/DR IN RCRD: CPT | Performed by: INTERNAL MEDICINE

## 2024-12-04 PROCEDURE — 1111F DSCHRG MED/CURRENT MED MERGE: CPT | Performed by: INTERNAL MEDICINE

## 2024-12-04 PROCEDURE — 1159F MED LIST DOCD IN RCRD: CPT | Performed by: INTERNAL MEDICINE

## 2024-12-04 PROCEDURE — 99495 TRANSJ CARE MGMT MOD F2F 14D: CPT | Performed by: INTERNAL MEDICINE

## 2024-12-04 ASSESSMENT — ENCOUNTER SYMPTOMS
WEAKNESS: 0
ARTHRALGIAS: 1
MYALGIAS: 0
HEADACHES: 0
CHILLS: 0
ACTIVITY CHANGE: 0
NUMBNESS: 0
COLOR CHANGE: 1
FATIGUE: 0
DIZZINESS: 0
FEVER: 0

## 2024-12-04 NOTE — PROGRESS NOTES
Subjective   Patient ID: Sharifa Moore is a 79 y.o. female who presents for hospital follow up.    Ms. Moore comes in today for a hospital follow-up.  She was bit by her cat on Tuesday, November 19.  She went to urgent care was treated with cefuroxime and Flagyl but this seemed to be spreading.  On the 23rd she presented to the emergency department with significant cellulitis of her right forearm surrounding the puncture wound from her cat.  She was started on IV cefuroxime and Flagyl but this still progressed over the next 24 hours, ID was consulted and she was switched to vancomycin and meropenem.  This responded dramatically within 24 to 48 hours and she was discharged home with 1 week therapy of doxycycline 100 mg twice daily as well as cefdinir 300 mg twice daily.  She was discharged on November 25.  She did have orthopedic/hand evaluation and no surgical debridement nor intervention was necessary.  This continues to improve.  Again she is finished with her antibiotics.  She does have some generalized itching over the area that was infected.  There is some darker discoloration during the healing process.  There is still a small nonfluctuant nodule underneath the actual puncture wound, no drainage currently, there was some initial drainage when she treated this with hydrogen peroxide after the initial injury.  She did receive a tetanus shot at the urgent care.  She plans on returning after the start of the year for her wellness visit.  She feels well otherwise, no fevers, range of motion and sensation is full.        Review of Systems   Constitutional:  Negative for activity change, chills, fatigue and fever.   Musculoskeletal:  Positive for arthralgias. Negative for myalgias.   Skin:  Positive for color change (Resolving bruising/post cellulitis discoloration, again improving.).   Neurological:  Negative for dizziness, weakness, numbness and headaches.       Objective   Physical Exam  Skin:     General:  Skin is warm.      Coloration: Skin is not jaundiced or pale.      Findings: Erythema and lesion present. No bruising or rash.             Comments: Slight darkish purpleish discoloration over the right anterior forearm, 1 cm nonfluctuant raised collection at initial puncture wound, no evidence of surrounding cellulitis, no swelling         Assessment/Plan        1.  Hospital follow-up for right forearm cellulitis after cat bite.  This continues to improve.  She has finished course of antibiotics.  Tetanus vaccine was updated on initial [facility] visit, added to immunization records.  Continues to improve/resolved.  She needs to keep a close watch on her symptoms.  If any swelling, redness, pain starts to recur, if she develops any systemic symptoms such as fevers, she needs to present back to the emergency department for reconsideration of IV antibiotics.  If the puncture wound region starts enlarging, becoming red or painful, again she needs to present back for possible I&D.  Currently course looks as though which is progressing appropriately and resolving.  She is to keep us updated and reach out with any questions.    Nicole Quiles MD 12/04/24 12:00 PM

## 2024-12-04 NOTE — PATIENT INSTRUCTIONS
I am glad to see that your symptoms are continuing to improve after your antibiotic course.  I encourage you to continue watching for any worsening of symptoms and if so, to present promptly back to the emergency department.  Please reach out if you have any questions.

## 2024-12-06 ENCOUNTER — APPOINTMENT (OUTPATIENT)
Dept: ORTHOPEDIC SURGERY | Facility: CLINIC | Age: 79
End: 2024-12-06
Payer: MEDICARE

## 2024-12-06 ENCOUNTER — HOSPITAL ENCOUNTER (OUTPATIENT)
Dept: RADIOLOGY | Facility: EXTERNAL LOCATION | Age: 79
Discharge: HOME | End: 2024-12-06

## 2024-12-06 DIAGNOSIS — M16.12 OSTEOARTHRITIS OF LEFT HIP, UNSPECIFIED OSTEOARTHRITIS TYPE: Primary | ICD-10-CM

## 2024-12-06 PROCEDURE — 1111F DSCHRG MED/CURRENT MED MERGE: CPT | Performed by: EMERGENCY MEDICINE

## 2024-12-06 PROCEDURE — 20611 DRAIN/INJ JOINT/BURSA W/US: CPT | Performed by: EMERGENCY MEDICINE

## 2024-12-06 PROCEDURE — 99214 OFFICE O/P EST MOD 30 MIN: CPT | Performed by: EMERGENCY MEDICINE

## 2024-12-06 RX ORDER — TRIAMCINOLONE ACETONIDE 40 MG/ML
80 INJECTION, SUSPENSION INTRA-ARTICULAR; INTRAMUSCULAR
Status: COMPLETED | OUTPATIENT
Start: 2024-12-06 | End: 2024-12-06

## 2024-12-06 RX ORDER — LIDOCAINE HYDROCHLORIDE 10 MG/ML
4 INJECTION, SOLUTION INFILTRATION; PERINEURAL
Status: COMPLETED | OUTPATIENT
Start: 2024-12-06 | End: 2024-12-06

## 2024-12-06 NOTE — PROGRESS NOTES
Subjective   Sharifa Moore is a 79 y.o. female who presents for Follow-up of the Left Hip (DOLI: 9/13/2024)    HPI    12/6/24: Patient returns today for left hip pain.  We did perform a left hip intra-articular injection for her on 9/13/2024 for known DJD.  She felt the injection worked well for her up until recently.  Her pain is since returned to become progressively worse.  Considering this, she would like have a repeat injection performed today.  No additional complaints.    9/13/24: Patient returns today for left hip pain.  We did perform a left hip intra-articular injection for her on 3/8/2024.  Since that time, she did have a repeat intra-articular injection with Dr. Desouza on 6/19/2024.  Unfortunately, she did not have drastic relief from that injection.  That said, she would like to have a repeat injection performed today to see if she is able to obtain relief.    3/8/24: Patient returns today for left hip pain.  We did perform a left hip intra-articular injection for her on 12/15/2023.  She felt that it worked quite well up until 3 weeks ago.  She like to have a repeat injection performed today.  However additionally, she would like to discuss the option of total joint replacement.  She feels that these injections are beginning to wean in response and would like to discuss the neck step.    12/15/23: Patient returns today for left hip pain.  We did perform a left hip intra-articular injection for her on 9/18/2023.  She felt this worked quite well for her and would like to have a repeat injection performed today.  No additional complaints.    9/18/23: Patient returns today for left hip pain. We performed a left hip intra-articular injection on 6/30/2023. She felt this worked well up until recently. She would like to have a repeat hip intra-articular injection today. She denies any further injuries. She has no additional complaints at this time.     6/30/23: Patient returns today for left hip pain. She  states the previous injection she received on 3/31/2023 worked quite well for her up until recently. She presents today requesting repeat injection. She denies further injuries. She has no additional complaints today.    3/31/23: Patient returns today for left hip pain. She states the previous injection she received on 12/19/2022 worked quite well for her. She presents today requesting repeat injection versus further treatment options. She denies any further injuries. She has no additional complaints at this time.    12/19/22: Patient returns today for reevaluation for left hip pain. She states the preinjection she received on 9/8/2022 worked quite well for her up until recently. She presents today requesting repeat injection. She denies any further injuries. She has no additional complaints at this time.    9/8/22: Patient returns today for reevaluation for left hip and right ankle pain. In regards to her hip pain, she feels that physical therapy has not helped significantly. She continues to have pain that she localizes deep in her groin with radiation to the lateral aspect of her hip. This pain is worse with hip flexion and has progressed to pain with weightbearing activities. Regards to her ankle pain, she no longer localizes her pain along the Achilles tendon, however she does localize it to the deep and anterior aspect of her ankle. She states that this is associated with swelling that becomes progressively worse throughout weightbearing activities. She would like to discuss further treatment options.    5/9/22: Sharifa is a very pleasant 77-year-old female who presented today 5/9/2022 for evaluation of left hip pain. She is known to me from previous visits for right knee arthritis. Today, she states that she has had about 2.5 months of left hip pain that started after a session on a stationary bike. She states that she was riding for approximately 10 minutes before feeling anterior left hip pain. She  stopped riding and felt a constant soreness/achy type sensation rated 6/10 intensity. She was seen by her primary care doctor who obtained a radiograph of the left hip and recommended Tylenol for pain control. She states that the pain is not responding to rest alone but does temporarily improve with Tylenol. Pain is worse with movement and exercise, better with Tylenol and rest. At the worst she rates the pain 9/10 intensity anterior left hip. She has never had issues with her hip prior to this. She denies any paresthesias or back pain. She denies any skin changes, erythema, redness, bruising or swelling. She denies any other complaints today. Subsequently, she is complaining of right posterior ankle pain. I did evaluate this in the past when she had generalized ankle pain and stiffness consistent with degenerative arthritis. However, she states that she has developed worsening pain on the posterior aspect that she localizes along the mid substance of the Achilles tendon. She states that this has become progressively worse of the past 2 months as well. She denies acute trauma.       ROS: All pertinent positive symptoms are included in the history of present illness.    All other systems have been reviewed and are negative and noncontributory to this patient's current ailments.    Objective     There were no vitals filed for this visit.    Physical Exam  General/Constitutional: No apparent distress. Well-nourished and well developed.  Head: Normocephalic, Atraumatic.   Eyes: EOMI.  Vascular: No edema, swelling or tenderness, except as noted in detailed exam.  Respiratory: Non-labored breathing.  Integumentary: No impressive skin lesions present, except as noted in detailed exam.  Neurological: Oriented to person, place, and time.  Psychological: Normal mood and affect.  Musculoskeletal: Normal, except as noted in detailed exam   The left hip and pelvis are without obvious signs of acute bony deformity, swelling,  erythema, ecchymosis or instability. There is tenderness to palpation over the rectus femoris and psoas over the left anterior hip region. Active straight leg raise versus gravity reproduces her anterior hip pain, although she is able to hold the leg in extension with assistance. Seated hip flexion versus resistance also produces pain. Active and passive range of motion are full and pain-free. Logroll is negative.. Straight leg raise test is negative for pain or radicular symptoms. Thaddeus is negative. Crossover is negative. Hip strength is weak as compared to the opposite hip. The opposite hip is otherwise nontender and stable. Gait is antalgic and tandem.       Patient ID: Sharifa Moore is a 79 y.o. female.    L Inj/Asp: L hip joint on 12/6/2024 10:53 AM  Indications: pain  Details: 20 G needle, ultrasound-guided anterior approach  Medications: 80 mg triamcinolone acetonide 40 mg/mL; 4 mL lidocaine 10 mg/mL (1 %)  Outcome: tolerated well, no immediate complications  Procedure, treatment alternatives, risks and benefits explained, specific risks discussed. Consent was given by the patient. Immediately prior to procedure a time out was called to verify the correct patient, procedure, equipment, support staff and site/side marked as required. Patient was prepped and draped in the usual sterile fashion.           Assessment/Plan   Problem List Items Addressed This Visit    None  Visit Diagnoses       Osteoarthritis of left hip, unspecified osteoarthritis type        Relevant Orders    Point of Care Ultrasound (Completed)            Considering that she did well with previous injection, I feel that a repeat injection is warranted today.  Discussed risks versus benefits of having injection performed. Patient has elected to proceed with procedure. Please refer to procedure note above. Discussed with patient to limit weightbearing activities over the next 24-48 hours, they can then progress to full activities as  tolerated. Discussed with patient to avoid water submersion over the next 2 days. Discussed with patient to call me immediately if they develop worsening pain, rash, erythema, or fevers.  I have placed a referral for total joint replacement for her.  I am more than happy to continue injection therapy for her as long as she would like.  She is aware that she cannot have a total hip replacement within 3 months of an injection.    Param Guzman, [facility] Medicine  [facility]     ** Please excuse any errors in grammar or translation related to this dictation. Voice recognition software was utilized to prepare this document. **

## 2024-12-13 ENCOUNTER — HOSPITAL ENCOUNTER (OUTPATIENT)
Dept: RADIOLOGY | Facility: EXTERNAL LOCATION | Age: 79
Discharge: HOME | End: 2024-12-13

## 2024-12-13 ENCOUNTER — APPOINTMENT (OUTPATIENT)
Dept: ORTHOPEDIC SURGERY | Facility: CLINIC | Age: 79
End: 2024-12-13
Payer: MEDICARE

## 2024-12-13 DIAGNOSIS — M17.11 ARTHRITIS OF RIGHT KNEE: Primary | ICD-10-CM

## 2024-12-13 PROCEDURE — 1111F DSCHRG MED/CURRENT MED MERGE: CPT | Performed by: EMERGENCY MEDICINE

## 2024-12-13 PROCEDURE — 20611 DRAIN/INJ JOINT/BURSA W/US: CPT | Performed by: EMERGENCY MEDICINE

## 2024-12-13 PROCEDURE — 99214 OFFICE O/P EST MOD 30 MIN: CPT | Performed by: EMERGENCY MEDICINE

## 2024-12-13 RX ORDER — TRIAMCINOLONE ACETONIDE 40 MG/ML
80 INJECTION, SUSPENSION INTRA-ARTICULAR; INTRAMUSCULAR
Status: COMPLETED | OUTPATIENT
Start: 2024-12-13 | End: 2024-12-13

## 2024-12-13 RX ORDER — LIDOCAINE HYDROCHLORIDE 10 MG/ML
4 INJECTION, SOLUTION INFILTRATION; PERINEURAL
Status: COMPLETED | OUTPATIENT
Start: 2024-12-13 | End: 2024-12-13

## 2024-12-13 ASSESSMENT — ENCOUNTER SYMPTOMS: KNEE SWELLING: 1

## 2024-12-13 NOTE — PROGRESS NOTES
Subjective   Sharifa Moore is a 79 y.o. female who presents for Follow-up of the Right Knee (DOLI: 9/20/24)    Right Knee     12/13/24: Patient returns today for right knee pain.  We did perform a right knee corticosteroid injection for her on 9/20/2024 for known DJD.  She felt this worked well up until recently.  Her pain is since returned and become progressively worse.  Considering this, she would like to have a repeat injection performed today.  No additional complaints or injuries.    9/20/24: Patient returns today for right knee pain.  We did perform a right knee corticosteroid injection for her on 6/28/2024.  She felt that it worked quite well for her up until recently.  Her pain has since returned and become progressively worse.  Considering this, she would like to have a repeat right knee corticosteroid injection today.  No additional complaints.     6/28/24: Patient returns today for right knee pain.  We did perform a right knee corticosteroid injection for her on 3/22/2024.  She felt that it worked quite well.  Her pain is since returned and become progressively worse.  Consider this, she would like to repeat injection performed today.  No additional complaints.    3/22/24: Patient returns today for right knee pain.  We did perform a right knee corticosteroid injection for her on 12/22/2023.  She felt that it were quite well and would like to have a repeat injection form today.  No additional complaints.    12/22/23: Patient returns today for right knee pain.  We performed a right knee corticosteroid injection on 7/28/2023.  She felt that this worked quite well for her and would like to have a repeat injection performed today.  No additional complaints.    7/28/23: Patient returns today for right knee pain. She states the previous injection she received on 5/5/2023 worked quite well up until recently. She presents today requesting a repeat injection. She denies any further injuries.    5/5/23:  Patient returns today for right knee pain. She states the previous injection she received on 1/13/2023 worked quite well for her up until recently. She would like to have a repeat injection today. She denies any further injuries. She has no additional complaints at this time.    1/13/23: Patient returns today for reevaluation for right knee pain. She states that the previous injection she received on 3/21/2022 did provide significant relief up until recently. She presents today requesting repeat injection. She denies any recent injuries.    3/21/22: Sharifa is a very pleasant 77-year-old female here today for evaluation of chronic right knee pain. She states that approximately 6 months ago her knee pain insidiously began to worsen without any acute injury or trauma. Referral by Dr Fernando. She does have a history of partial tear of her right Achilles 4 years ago however, she has no other history of right lower extremity pain. She has no surgical history for this knee. She describes an achy pain about her right anterior and lateral aspect of her knee that is worse with standing for too long, kneeling, bending, stairs, standing from a seated position and she does feel unstable at times. Pain rated as moderate in intensity, not associated with any swelling or erythema. She has tried naproxen 1 tab about 2-3 times a week, Tylenol arthritis and Vinny green herbal cream for pain relief. She denies any recent injury. She would like to discuss further treatment options today. No other complaints today.       ROS: All pertinent positive symptoms are included in the history of present illness.    All other systems have been reviewed and are negative and noncontributory to this patient's current ailments.    Objective     There were no vitals filed for this visit.    Physical Exam  ysical Exam  General/Constitutional: No apparent distress. Well-nourished and well developed.  Head: Normocephalic, Atraumatic.   Eyes:  EOMI.  Vascular: No edema, swelling or tenderness, except as noted in detailed exam.  Respiratory: Non-labored breathing.  Integumentary: No impressive skin lesions present, except as noted in detailed exam.  Neurological: Oriented to person, place, and time.  Psychological: Normal mood and affect.  Musculoskeletal: Normal, except as noted in detailed exam     The right knee is without obvious signs of acute bony deformity, swelling, erythema, ecchymosis or joint effusion. The patella is without tenderness. Apprehension is negative with medial and lateral glide. Patella crepitus is negative. Patella grind is positive. The medial joint line is nontender and without bony crepitus or step-off. The lateral joint line is TTP and without bony crepitus or step-off. Flexion & extension are full and symmetrical. Varus & valgus stress test at 0° and 30° of flexion, Lachman's, Bj's, Apley's, dial test at 90° and 30° of flexion, and posterior drawer are all negative. The opposite knee is nontender and stable. Gait is pain-free and tandem.    Patient ID: Sharifa Moore is a 79 y.o. female.    L Inj/Asp: R knee on 12/13/2024 10:27 AM  Indications: pain  Details: 25 G needle, ultrasound-guided superolateral approach  Medications: 80 mg triamcinolone acetonide 40 mg/mL; 4 mL lidocaine 10 mg/mL (1 %)  Outcome: tolerated well, no immediate complications  Procedure, treatment alternatives, risks and benefits explained, specific risks discussed. Consent was given by the patient. Immediately prior to procedure a time out was called to verify the correct patient, procedure, equipment, support staff and site/side marked as required. Patient was prepped and draped in the usual sterile fashion.           Assessment/Plan   Problem List Items Addressed This Visit    None  Visit Diagnoses       Arthritis of right knee        Relevant Orders    Point of Care Ultrasound (Completed)          Considering that she has done well with  previous injections, I feel that a repeat injection today is warranted. Discussed risks versus benefits of having injection performed. Patient has elected to proceed with procedure. Please refer to procedure note above. Discussed with patient to limit weightbearing activities over the next 24-48 hours, they can then progress to full activities as tolerated. Discussed with patient to avoid water submersion over the next 2 days. Discussed with patient to call me immediately if they develop worsening pain, rash, erythema, or fevers. Patient should follow-up as needed if pain persists or worsens.    Param Guzman, DO  Sports Medicine  [facility]     ** Please excuse any errors in grammar or translation related to this dictation. Voice recognition software was utilized to prepare this document. **

## 2025-01-06 ENCOUNTER — PATIENT OUTREACH (OUTPATIENT)
Dept: PRIMARY CARE | Facility: CLINIC | Age: 80
End: 2025-01-06
Payer: MEDICARE

## 2025-01-06 DIAGNOSIS — Z09 HOSPITAL DISCHARGE FOLLOW-UP: ICD-10-CM

## 2025-01-06 NOTE — PROGRESS NOTES
Successful outreach to patient regarding hospitalization as patient continues TCM program 30 days post discharge.   At time of outreach call the patient feels as if their condition has improved since initial visit with PCP or specialist. Questions or concerns addressed at this time with patient. Patient states she is doing very well; still has some discoloration in her skin where the cat bite was, but all swelling has resolved. Provided TCM contact information to patient if any further non-emergent needs arise. Patient verbalized their understanding and denied any questions, concerns or needs from TCM at this time.   Patient has met target of no readmission for 30 days post hospital discharge and is graduated from Transitional Care Management program at this time.

## 2025-01-14 ENCOUNTER — APPOINTMENT (OUTPATIENT)
Dept: PRIMARY CARE | Facility: CLINIC | Age: 80
End: 2025-01-14
Payer: MEDICARE

## 2025-01-14 VITALS
SYSTOLIC BLOOD PRESSURE: 139 MMHG | WEIGHT: 161 LBS | BODY MASS INDEX: 29.45 KG/M2 | HEART RATE: 100 BPM | DIASTOLIC BLOOD PRESSURE: 90 MMHG

## 2025-01-14 DIAGNOSIS — M25.552 PAIN OF LEFT HIP: ICD-10-CM

## 2025-01-14 DIAGNOSIS — M54.50 LOW BACK PAIN, UNSPECIFIED BACK PAIN LATERALITY, UNSPECIFIED CHRONICITY, UNSPECIFIED WHETHER SCIATICA PRESENT: Primary | ICD-10-CM

## 2025-01-14 PROCEDURE — 3075F SYST BP GE 130 - 139MM HG: CPT | Performed by: INTERNAL MEDICINE

## 2025-01-14 PROCEDURE — 99214 OFFICE O/P EST MOD 30 MIN: CPT | Performed by: INTERNAL MEDICINE

## 2025-01-14 PROCEDURE — 3080F DIAST BP >= 90 MM HG: CPT | Performed by: INTERNAL MEDICINE

## 2025-01-14 PROCEDURE — 1160F RVW MEDS BY RX/DR IN RCRD: CPT | Performed by: INTERNAL MEDICINE

## 2025-01-14 PROCEDURE — 1036F TOBACCO NON-USER: CPT | Performed by: INTERNAL MEDICINE

## 2025-01-14 PROCEDURE — 1159F MED LIST DOCD IN RCRD: CPT | Performed by: INTERNAL MEDICINE

## 2025-01-14 RX ORDER — METHOCARBAMOL 500 MG/1
500 TABLET, FILM COATED ORAL 3 TIMES DAILY PRN
Qty: 15 TABLET | Refills: 0 | Status: SHIPPED | OUTPATIENT
Start: 2025-01-14 | End: 2025-01-24

## 2025-01-14 RX ORDER — MECLIZINE HYDROCHLORIDE 25 MG/1
TABLET ORAL
COMMUNITY
Start: 2024-12-18

## 2025-01-14 RX ORDER — MELOXICAM 7.5 MG/1
7.5 TABLET ORAL 2 TIMES DAILY PRN
Qty: 40 TABLET | Refills: 1 | Status: SHIPPED | OUTPATIENT
Start: 2025-01-14 | End: 2026-01-14

## 2025-01-14 ASSESSMENT — ENCOUNTER SYMPTOMS
HEADACHES: 0
COUGH: 0
NUMBNESS: 0
CHILLS: 0
SHORTNESS OF BREATH: 0
WEAKNESS: 0
ACTIVITY CHANGE: 0
ARTHRALGIAS: 1
WHEEZING: 0
FATIGUE: 0
MYALGIAS: 1
LIGHT-HEADEDNESS: 0
CHEST TIGHTNESS: 0
DIZZINESS: 0
BACK PAIN: 1
PALPITATIONS: 0

## 2025-01-14 NOTE — PATIENT INSTRUCTIONS
I am glad that the injection therapy is helping the hip pain, you do have quite significant muscle spasm in the back however, and we could consider medications to help manage this.  I have called in meloxicam to take once or twice daily, please be cautious as there may be a reaction with your sulfa allergy.  You may consider an antihistamine prior to taking this and if you have any hives or allergic reaction develop, please stop this promptly.  I have sent in a muscle relaxant as well.  Please take caution as this can cause you to be quite sedate.  I would recommend heat alternating with ice as well as stretching exercises.  Please keep us updated on how you are feeling especially if symptoms continue or worsen.  Otherwise, we will plan on seeing you back next month for your wellness visit as scheduled.  Please reach out with any questions.

## 2025-01-14 NOTE — PROGRESS NOTES
Subjective   Patient ID: Sharifa Moore is a 79 y.o. female who presents for comprehensive follow up.    Ms. Moore comes in today with hip pain.  She initially scheduled this is a wellness visit but is not due until later in the winter.  She has been seeing an orthopedist for left hip pain and receiving injections.  This has been somewhat helpful, but she has a significant scoliosis and this causes some spasm in her mid back as well.  This has been worsening recently.  She does notice some significant muscle tension.  She finds that standing for long periods of time tends to make this worse.  She has used a muscle relaxant in the past but this caused her to be quite sedate and disoriented.  She did find that meloxicam helped in the past but has not had this in some time.  She plans on returning for her wellness visit later in the winter or spring.  She denies any additional concerns at this time and really is doing reasonably well.    Back Pain  Pertinent negatives include no chest pain, headaches, numbness or weakness.       Review of Systems   Constitutional:  Negative for activity change, chills and fatigue.   Respiratory:  Negative for cough, chest tightness, shortness of breath and wheezing.    Cardiovascular:  Negative for chest pain, palpitations and leg swelling.   Musculoskeletal:  Positive for arthralgias, back pain, gait problem and myalgias.   Neurological:  Negative for dizziness, weakness, light-headedness, numbness and headaches.       Objective   Physical Exam  Constitutional:       Appearance: Normal appearance.   Pulmonary:      Effort: Pulmonary effort is normal. No respiratory distress.   Musculoskeletal:         General: Swelling and deformity present.      Right lower leg: No edema.      Left lower leg: No edema.      Comments: Moderate scoliosis with significant muscle spasms in the left thoracic paraspinal musculature.  Straight leg raise negative.   Neurological:      Mental Status: She  is alert.         Assessment/Plan     1.  Hip arthralgias: Following with orthopedics, doing well with injection therapy.  2.  Back pain with spasm: Could consider low-dose muscle relaxant, meloxicam, heat alternating with ice as well as stretching exercises.  She does have a documented sulfa allergy but she does not recall what this was other than mild hives many years ago.  She will take an antihistamine prior to the meloxicam, she believes she has tolerated this in the past.    She will contact us with any questions.  Otherwise, we will see her back later this winter for her wellness visit.      Nicole Quiles MD 01/14/25 12:31 PM

## 2025-02-28 ENCOUNTER — APPOINTMENT (OUTPATIENT)
Dept: PRIMARY CARE | Facility: CLINIC | Age: 80
End: 2025-02-28
Payer: MEDICARE

## 2025-02-28 VITALS
SYSTOLIC BLOOD PRESSURE: 133 MMHG | WEIGHT: 159.4 LBS | HEART RATE: 99 BPM | OXYGEN SATURATION: 96 % | BODY MASS INDEX: 29.15 KG/M2 | DIASTOLIC BLOOD PRESSURE: 80 MMHG

## 2025-02-28 DIAGNOSIS — Z78.0 POSTMENOPAUSAL ESTROGEN DEFICIENCY: ICD-10-CM

## 2025-02-28 DIAGNOSIS — Z12.31 ENCOUNTER FOR SCREENING MAMMOGRAM FOR MALIGNANT NEOPLASM OF BREAST: ICD-10-CM

## 2025-02-28 DIAGNOSIS — E78.2 MIXED HYPERLIPIDEMIA: ICD-10-CM

## 2025-02-28 DIAGNOSIS — Z00.00 ROUTINE GENERAL MEDICAL EXAMINATION AT HEALTH CARE FACILITY: Primary | ICD-10-CM

## 2025-02-28 DIAGNOSIS — Z23 NEED FOR PNEUMOCOCCAL 20-VALENT CONJUGATE VACCINATION: ICD-10-CM

## 2025-02-28 DIAGNOSIS — M81.0 AGE-RELATED OSTEOPOROSIS WITHOUT CURRENT PATHOLOGICAL FRACTURE: ICD-10-CM

## 2025-02-28 DIAGNOSIS — M54.50 LOW BACK PAIN, UNSPECIFIED BACK PAIN LATERALITY, UNSPECIFIED CHRONICITY, UNSPECIFIED WHETHER SCIATICA PRESENT: ICD-10-CM

## 2025-02-28 DIAGNOSIS — R79.9 ABNORMAL BLOOD CELL COUNT: ICD-10-CM

## 2025-02-28 DIAGNOSIS — I10 PRIMARY HYPERTENSION: ICD-10-CM

## 2025-02-28 DIAGNOSIS — R73.01 ELEVATED FASTING GLUCOSE: ICD-10-CM

## 2025-02-28 PROCEDURE — 99214 OFFICE O/P EST MOD 30 MIN: CPT | Performed by: INTERNAL MEDICINE

## 2025-02-28 PROCEDURE — 90677 PCV20 VACCINE IM: CPT | Performed by: INTERNAL MEDICINE

## 2025-02-28 PROCEDURE — 1160F RVW MEDS BY RX/DR IN RCRD: CPT | Performed by: INTERNAL MEDICINE

## 2025-02-28 PROCEDURE — G0009 ADMIN PNEUMOCOCCAL VACCINE: HCPCS | Performed by: INTERNAL MEDICINE

## 2025-02-28 PROCEDURE — 1170F FXNL STATUS ASSESSED: CPT | Performed by: INTERNAL MEDICINE

## 2025-02-28 PROCEDURE — 1159F MED LIST DOCD IN RCRD: CPT | Performed by: INTERNAL MEDICINE

## 2025-02-28 PROCEDURE — 3075F SYST BP GE 130 - 139MM HG: CPT | Performed by: INTERNAL MEDICINE

## 2025-02-28 PROCEDURE — G0439 PPPS, SUBSEQ VISIT: HCPCS | Performed by: INTERNAL MEDICINE

## 2025-02-28 PROCEDURE — 1158F ADVNC CARE PLAN TLK DOCD: CPT | Performed by: INTERNAL MEDICINE

## 2025-02-28 PROCEDURE — 3079F DIAST BP 80-89 MM HG: CPT | Performed by: INTERNAL MEDICINE

## 2025-02-28 PROCEDURE — 1123F ACP DISCUSS/DSCN MKR DOCD: CPT | Performed by: INTERNAL MEDICINE

## 2025-02-28 PROCEDURE — 1036F TOBACCO NON-USER: CPT | Performed by: INTERNAL MEDICINE

## 2025-02-28 RX ORDER — CYCLOBENZAPRINE HCL 10 MG
10 TABLET ORAL NIGHTLY PRN
Qty: 30 TABLET | Refills: 2 | Status: SHIPPED | OUTPATIENT
Start: 2025-02-28 | End: 2025-10-26

## 2025-02-28 ASSESSMENT — ENCOUNTER SYMPTOMS
BACK PAIN: 1
LOSS OF SENSATION IN FEET: 0
SORE THROAT: 0
SINUS PRESSURE: 0
CHEST TIGHTNESS: 0
FREQUENCY: 0
NECK STIFFNESS: 0
PALPITATIONS: 0
COUGH: 0
OCCASIONAL FEELINGS OF UNSTEADINESS: 0
SEIZURES: 0
DYSPHORIC MOOD: 0
COLOR CHANGE: 0
DYSURIA: 0
ARTHRALGIAS: 1
DECREASED CONCENTRATION: 0
VOICE CHANGE: 0
DIARRHEA: 0
HEADACHES: 0
VOMITING: 0
NERVOUS/ANXIOUS: 0
WHEEZING: 0
FATIGUE: 0
NECK PAIN: 0
ADENOPATHY: 0
SHORTNESS OF BREATH: 0
FEVER: 0
BRUISES/BLEEDS EASILY: 0
ABDOMINAL DISTENTION: 0
DEPRESSION: 0
ABDOMINAL PAIN: 0
EYE ITCHING: 0
WEAKNESS: 0
CHILLS: 0
MYALGIAS: 0
HEMATURIA: 0
SLEEP DISTURBANCE: 0
CONSTIPATION: 0
EYE DISCHARGE: 0
RHINORRHEA: 0
HYPERACTIVE: 0
ACTIVITY CHANGE: 0
SINUS PAIN: 0
LIGHT-HEADEDNESS: 0
APPETITE CHANGE: 0
DIFFICULTY URINATING: 0
DIZZINESS: 0
NAUSEA: 0

## 2025-02-28 ASSESSMENT — ACTIVITIES OF DAILY LIVING (ADL)
TAKING_MEDICATION: INDEPENDENT
GROCERY_SHOPPING: INDEPENDENT
DRESSING: INDEPENDENT
BATHING: INDEPENDENT
DOING_HOUSEWORK: INDEPENDENT
MANAGING_FINANCES: INDEPENDENT

## 2025-02-28 ASSESSMENT — PATIENT HEALTH QUESTIONNAIRE - PHQ9
2. FEELING DOWN, DEPRESSED OR HOPELESS: NOT AT ALL
SUM OF ALL RESPONSES TO PHQ9 QUESTIONS 1 AND 2: 0
1. LITTLE INTEREST OR PLEASURE IN DOING THINGS: NOT AT ALL

## 2025-02-28 NOTE — PATIENT INSTRUCTIONS
It was a pleasure seeing you in the office today.  We will follow up on all comprehensive blood work once results are available and make any recommendations based on these results as may be indicated.  Please continue with routine gynecologic exams and annual mammograms.  Colonoscopy remains up-to-date and likely not needing to be repeated.  Thank you for updating your pneumonia vaccine today, please consider an RSV vaccine through your local pharmacy as well.  I have called in the additional muscle relaxant called cyclobenzaprine.  Please let us know if this is not beneficial and please take caution of the sedating side effects.  Please keep close follow-up with your specialist.  If you have any questions, please let us know.  Otherwise, we are happy to see you annually for your wellness visits.

## 2025-02-28 NOTE — PROGRESS NOTES
Subjective   Reason for Visit: Sharifa Moore is an 79 y.o. female patient comes in today for comprehensive follow-up of medical conditions in conjunction with annual wellness visit    Reviewed all medications by prescribing practitioner or clinical pharmacist (such as prescriptions, OTCs, herbal therapies and supplements) and documented in the medical record.    Ms. Moore comes in today for comprehensive follow-up of medical conditions in conjunction with annual wellness visit, dictated in a separate subsection.  Please refer to that portion of the note for details on healthcare maintenance and screening studies.  She is feeling reasonably well.  She continues to have troubles with back pain and hip pain.  She is following with an orthopedist for this.  She denies any specific concerns of headaches, dizziness, chest pain or palpitations.  She is due for some updated healthcare maintenance studies.  She does have a prescription for meloxicam but this has not been of great help.  She has found that methocarbamol has not been of great benefit either, she was curious whether cyclobenzaprine would be a better option.  She is comfortable updating her pneumonia vaccines, tries to keep up with other routine vaccines.  She is due for updated imaging studies.  She denies any additional concerns.  She plans on having her lab work done today after just a very light breakfast several hours ago.        Patient Care Team:  Nicole Quiles MD as PCP - General (Internal Medicine)  Dov Jo MD as PCP - [facility] Attributed Provider     Review of Systems   Constitutional:  Negative for activity change, appetite change, chills, fatigue and fever.   HENT:  Negative for congestion, ear pain, postnasal drip, rhinorrhea, sinus pressure, sinus pain, sneezing, sore throat, tinnitus and voice change.    Eyes:  Negative for discharge, itching and visual disturbance.   Respiratory:  Negative for cough, chest tightness, shortness of breath  and wheezing.    Cardiovascular:  Negative for chest pain, palpitations and leg swelling.   Gastrointestinal:  Negative for abdominal distention, abdominal pain, constipation, diarrhea, nausea and vomiting.   Endocrine: Negative for cold intolerance, heat intolerance and polyuria.   Genitourinary:  Negative for difficulty urinating, dysuria, frequency, hematuria, urgency, vaginal bleeding and vaginal discharge.   Musculoskeletal:  Positive for arthralgias and back pain. Negative for myalgias, neck pain and neck stiffness.   Skin:  Negative for color change, pallor and rash.   Allergic/Immunologic: Negative for environmental allergies, food allergies and immunocompromised state.   Neurological:  Negative for dizziness, seizures, syncope, weakness, light-headedness and headaches.   Hematological:  Negative for adenopathy. Does not bruise/bleed easily.   Psychiatric/Behavioral:  Negative for behavioral problems, decreased concentration, dysphoric mood and sleep disturbance. The patient is not nervous/anxious and is not hyperactive.        Objective   Vitals:  /80 (BP Location: Right arm, Patient Position: Sitting)   Pulse 99   Wt 72.3 kg (159 lb 6.4 oz)   SpO2 96%   BMI 29.15 kg/m²       Physical Exam  Constitutional:       General: She is not in acute distress.     Appearance: Normal appearance. She is well-developed. She is not ill-appearing.   HENT:      Head: Normocephalic.      Right Ear: Tympanic membrane, ear canal and external ear normal.      Left Ear: Tympanic membrane, ear canal and external ear normal.      Nose: Nose normal.      Mouth/Throat:      Mouth: Mucous membranes are moist.      Pharynx: Oropharynx is clear. No oropharyngeal exudate or posterior oropharyngeal erythema.   Eyes:      General: Lids are normal. No scleral icterus.     Extraocular Movements: Extraocular movements intact.      Conjunctiva/sclera: Conjunctivae normal.      Pupils: Pupils are equal, round, and reactive to light.    Neck:      Vascular: No carotid bruit.   Cardiovascular:      Rate and Rhythm: Normal rate and regular rhythm.      Pulses: Normal pulses.      Heart sounds: No murmur heard.     No gallop.   Pulmonary:      Effort: Pulmonary effort is normal. No respiratory distress.      Breath sounds: No wheezing, rhonchi or rales.   Chest:   Breasts:     Right: No mass.      Left: No mass.   Abdominal:      General: Bowel sounds are normal. There is no distension.      Palpations: Abdomen is soft. There is no mass.      Tenderness: There is no abdominal tenderness. There is no guarding or rebound.   Genitourinary:     Comments: Patient defers  Musculoskeletal:         General: No swelling or signs of injury.      Cervical back: Normal range of motion and neck supple. No tenderness.      Right lower leg: No edema.      Left lower leg: No edema.   Lymphadenopathy:      Cervical: No cervical adenopathy.      Upper Body:      Right upper body: No supraclavicular or axillary adenopathy.      Left upper body: No supraclavicular or axillary adenopathy.   Skin:     General: Skin is warm and dry.      Coloration: Skin is not pale.      Findings: No bruising or rash.   Neurological:      General: No focal deficit present.      Mental Status: She is alert and oriented to person, place, and time.      Cranial Nerves: No cranial nerve deficit.      Motor: No weakness.      Coordination: Coordination normal.      Gait: Gait normal.   Psychiatric:         Mood and Affect: Mood normal.         Behavior: Behavior normal.         Thought Content: Thought content normal.         Judgment: Judgment normal.         Assessment & Plan  Need for pneumococcal 20-valent conjugate vaccination [Z23]         Full age-appropriate comprehensive physical exam and health care maintenance performed and discussed today.  Routine safety and preventative measures discussed including self breast exam, seatbelt use, no drinking and driving, no texting and driving,  abstinence or cessation of tobacco use, routine dental and vision exams, healthy diet and regular exercise.    We will update comprehensive labs and follow-up on results once available.  Due for mammogram and DEXA.  Both requisitions placed.  Colonoscopy up-to-date from 2022, no indication for repeat.  EKG up-to-date.  Monitor at appropriate intervals or based on symptoms.  Prevnar 20 provided today.  Has completed shingles vaccine series.  Tetanus up-to-date from 2024.  Have counseled regarding annual flu shots and updated COVID boosters as well as RSV vaccine.    In addition to the above-mentioned healthcare maintenance and screening studies, the following were discussed and assessed in detail today:  1.  Hyperlipidemia: Due for updated labs.  Follow-up on results once available.  Contact us if refills needed.  2.  Hip arthritis: Following with orthopedist regularly.  3.  Back pain: Can try switch to cyclobenzaprine, cautioning about sedating side effects.  4.  Osteoporosis: Due for updated DEXA.  Update vitamin D level as well.  5.  History of high blood pressure: Has not required therapy recently, blood pressure well-managed.    Happy to see her back annually for her wellness visits.  She is to contact us with any questions in the interim.

## 2025-03-01 LAB
25(OH)D3+25(OH)D2 SERPL-MCNC: 35 NG/ML (ref 30–100)
ALBUMIN SERPL-MCNC: 4.5 G/DL (ref 3.6–5.1)
ALBUMIN/CREAT UR: 16 MG/G CREAT
ALP SERPL-CCNC: 107 U/L (ref 37–153)
ALT SERPL-CCNC: 20 U/L (ref 6–29)
ANION GAP SERPL CALCULATED.4IONS-SCNC: 12 MMOL/L (CALC) (ref 7–17)
APPEARANCE UR: ABNORMAL
AST SERPL-CCNC: 21 U/L (ref 10–35)
BACTERIA #/AREA URNS HPF: ABNORMAL /HPF
BASOPHILS # BLD AUTO: 39 CELLS/UL (ref 0–200)
BASOPHILS NFR BLD AUTO: 0.5 %
BILIRUB SERPL-MCNC: 0.7 MG/DL (ref 0.2–1.2)
BILIRUB UR QL STRIP: ABNORMAL
BUN SERPL-MCNC: 17 MG/DL (ref 7–25)
CALCIUM SERPL-MCNC: 9.7 MG/DL (ref 8.6–10.4)
CHLORIDE SERPL-SCNC: 102 MMOL/L (ref 98–110)
CHOLEST SERPL-MCNC: 188 MG/DL
CHOLEST/HDLC SERPL: 2.8 (CALC)
CK SERPL-CCNC: 76 U/L (ref 18–225)
CO2 SERPL-SCNC: 25 MMOL/L (ref 20–32)
COLOR UR: ABNORMAL
CREAT SERPL-MCNC: 0.77 MG/DL (ref 0.6–1)
CREAT UR-MCNC: 277 MG/DL (ref 20–275)
EGFRCR SERPLBLD CKD-EPI 2021: 79 ML/MIN/1.73M2
EOSINOPHIL # BLD AUTO: 246 CELLS/UL (ref 15–500)
EOSINOPHIL NFR BLD AUTO: 3.2 %
ERYTHROCYTE [DISTWIDTH] IN BLOOD BY AUTOMATED COUNT: 13.5 % (ref 11–15)
EST. AVERAGE GLUCOSE BLD GHB EST-MCNC: 120 MG/DL
EST. AVERAGE GLUCOSE BLD GHB EST-SCNC: 6.6 MMOL/L
GLUCOSE SERPL-MCNC: 94 MG/DL (ref 65–139)
GLUCOSE UR QL STRIP: NEGATIVE
HBA1C MFR BLD: 5.8 % OF TOTAL HGB
HCT VFR BLD AUTO: 44 % (ref 35–45)
HDLC SERPL-MCNC: 68 MG/DL
HGB BLD-MCNC: 14.5 G/DL (ref 11.7–15.5)
HGB UR QL STRIP: ABNORMAL
HYALINE CASTS #/AREA URNS LPF: ABNORMAL /LPF
IRON SATN MFR SERPL: 20 % (CALC) (ref 16–45)
IRON SERPL-MCNC: 71 MCG/DL (ref 45–160)
KETONES UR QL STRIP: ABNORMAL
LDLC SERPL CALC-MCNC: 100 MG/DL (CALC)
LEUKOCYTE ESTERASE UR QL STRIP: ABNORMAL
LYMPHOCYTES # BLD AUTO: 1771 CELLS/UL (ref 850–3900)
LYMPHOCYTES NFR BLD AUTO: 23 %
MCH RBC QN AUTO: 30.1 PG (ref 27–33)
MCHC RBC AUTO-ENTMCNC: 33 G/DL (ref 32–36)
MCV RBC AUTO: 91.5 FL (ref 80–100)
MICROALBUMIN UR-MCNC: 4.5 MG/DL
MONOCYTES # BLD AUTO: 701 CELLS/UL (ref 200–950)
MONOCYTES NFR BLD AUTO: 9.1 %
NEUTROPHILS # BLD AUTO: 4943 CELLS/UL (ref 1500–7800)
NEUTROPHILS NFR BLD AUTO: 64.2 %
NITRITE UR QL STRIP: NEGATIVE
NONHDLC SERPL-MCNC: 120 MG/DL (CALC)
PH UR STRIP: ABNORMAL [PH] (ref 5–8)
PLATELET # BLD AUTO: 284 THOUSAND/UL (ref 140–400)
PMV BLD REES-ECKER: 10.5 FL (ref 7.5–12.5)
POTASSIUM SERPL-SCNC: 4.1 MMOL/L (ref 3.5–5.3)
PROT SERPL-MCNC: 6.9 G/DL (ref 6.1–8.1)
PROT UR QL STRIP: ABNORMAL
RBC # BLD AUTO: 4.81 MILLION/UL (ref 3.8–5.1)
RBC #/AREA URNS HPF: ABNORMAL /HPF
SERVICE CMNT-IMP: ABNORMAL
SODIUM SERPL-SCNC: 139 MMOL/L (ref 135–146)
SP GR UR STRIP: 1.02 (ref 1–1.03)
SQUAMOUS #/AREA URNS HPF: ABNORMAL /HPF
TIBC SERPL-MCNC: 358 MCG/DL (CALC) (ref 250–450)
TRIGL SERPL-MCNC: 103 MG/DL
TSH SERPL-ACNC: 2.94 MIU/L (ref 0.4–4.5)
URATE SERPL-MCNC: 5 MG/DL (ref 2.5–7)
VIT B12 SERPL-MCNC: 501 PG/ML (ref 200–1100)
WBC # BLD AUTO: 7.7 THOUSAND/UL (ref 3.8–10.8)
WBC #/AREA URNS HPF: ABNORMAL /HPF

## 2025-03-24 ENCOUNTER — APPOINTMENT (OUTPATIENT)
Dept: RADIOLOGY | Facility: CLINIC | Age: 80
End: 2025-03-24
Payer: MEDICARE

## 2025-03-26 ENCOUNTER — TELEPHONE (OUTPATIENT)
Dept: PRIMARY CARE | Facility: CLINIC | Age: 80
End: 2025-03-26

## 2025-03-26 DIAGNOSIS — M19.90 ARTHRITIS: Primary | ICD-10-CM

## 2025-03-26 RX ORDER — MELOXICAM 15 MG/1
15 TABLET ORAL DAILY PRN
Qty: 30 TABLET | Refills: 1 | Status: SHIPPED | OUTPATIENT
Start: 2025-03-26 | End: 2026-03-26

## 2025-03-27 ENCOUNTER — HOSPITAL ENCOUNTER (OUTPATIENT)
Dept: RADIOLOGY | Facility: CLINIC | Age: 80
Discharge: HOME | End: 2025-03-27
Payer: MEDICARE

## 2025-03-27 DIAGNOSIS — Z78.0 POSTMENOPAUSAL ESTROGEN DEFICIENCY: ICD-10-CM

## 2025-03-27 PROCEDURE — 77080 DXA BONE DENSITY AXIAL: CPT

## 2025-03-28 ENCOUNTER — APPOINTMENT (OUTPATIENT)
Dept: ORTHOPEDIC SURGERY | Facility: CLINIC | Age: 80
End: 2025-03-28
Payer: MEDICARE

## 2025-03-28 ENCOUNTER — APPOINTMENT (OUTPATIENT)
Dept: OTOLARYNGOLOGY | Facility: CLINIC | Age: 80
End: 2025-03-28
Payer: MEDICARE

## 2025-03-28 ENCOUNTER — HOSPITAL ENCOUNTER (OUTPATIENT)
Dept: RADIOLOGY | Facility: EXTERNAL LOCATION | Age: 80
Discharge: HOME | End: 2025-03-28

## 2025-03-28 DIAGNOSIS — M16.12 OSTEOARTHRITIS OF LEFT HIP, UNSPECIFIED OSTEOARTHRITIS TYPE: Primary | ICD-10-CM

## 2025-03-28 PROCEDURE — 20611 DRAIN/INJ JOINT/BURSA W/US: CPT | Performed by: EMERGENCY MEDICINE

## 2025-03-28 PROCEDURE — 99214 OFFICE O/P EST MOD 30 MIN: CPT | Performed by: EMERGENCY MEDICINE

## 2025-03-28 PROCEDURE — 1123F ACP DISCUSS/DSCN MKR DOCD: CPT | Performed by: EMERGENCY MEDICINE

## 2025-03-28 RX ORDER — LIDOCAINE HYDROCHLORIDE 10 MG/ML
4 INJECTION, SOLUTION INFILTRATION; PERINEURAL
Status: COMPLETED | OUTPATIENT
Start: 2025-03-28 | End: 2025-03-28

## 2025-03-28 RX ORDER — TRIAMCINOLONE ACETONIDE 40 MG/ML
80 INJECTION, SUSPENSION INTRA-ARTICULAR; INTRAMUSCULAR
Status: COMPLETED | OUTPATIENT
Start: 2025-03-28 | End: 2025-03-28

## 2025-03-28 RX ADMIN — TRIAMCINOLONE ACETONIDE 80 MG: 40 INJECTION, SUSPENSION INTRA-ARTICULAR; INTRAMUSCULAR at 10:34

## 2025-03-28 RX ADMIN — LIDOCAINE HYDROCHLORIDE 4 ML: 10 INJECTION, SOLUTION INFILTRATION; PERINEURAL at 10:34

## 2025-03-28 NOTE — PROGRESS NOTES
Left message to call office     Subjective   Sharifa Moore is a 80 y.o. female who presents for Follow-up of the Left Hip    HPI    3/28/25: Patient returns today for left hip pain.  She has known left hip DJD and we did perform an intra-articular corticosteroid injection for her on 12/6/2024.  She felt this worked well up until recently.  Her pain is since returned and become progressively worse.  Considering this, she would like a repeat injection performed today.  No additional complaints at this time.    12/6/24: Patient returns today for left hip pain.  We did perform a left hip intra-articular injection for her on 9/13/2024 for known DJD.  She felt the injection worked well for her up until recently.  Her pain is since returned to become progressively worse.  Considering this, she would like have a repeat injection performed today.  No additional complaints.    9/13/24: Patient returns today for left hip pain.  We did perform a left hip intra-articular injection for her on 3/8/2024.  Since that time, she did have a repeat intra-articular injection with Dr. Desouza on 6/19/2024.  Unfortunately, she did not have drastic relief from that injection.  That said, she would like to have a repeat injection performed today to see if she is able to obtain relief.    3/8/24: Patient returns today for left hip pain.  We did perform a left hip intra-articular injection for her on 12/15/2023.  She felt that it worked quite well up until 3 weeks ago.  She like to have a repeat injection performed today.  However additionally, she would like to discuss the option of total joint replacement.  She feels that these injections are beginning to wean in response and would like to discuss the neck step.    12/15/23: Patient returns today for left hip pain.  We did perform a left hip intra-articular injection for her on 9/18/2023.  She felt this worked quite well for her and would like to have a repeat injection performed today.  No additional  complaints.    9/18/23: Patient returns today for left hip pain. We performed a left hip intra-articular injection on 6/30/2023. She felt this worked well up until recently. She would like to have a repeat hip intra-articular injection today. She denies any further injuries. She has no additional complaints at this time.     6/30/23: Patient returns today for left hip pain. She states the previous injection she received on 3/31/2023 worked quite well for her up until recently. She presents today requesting repeat injection. She denies further injuries. She has no additional complaints today.    3/31/23: Patient returns today for left hip pain. She states the previous injection she received on 12/19/2022 worked quite well for her. She presents today requesting repeat injection versus further treatment options. She denies any further injuries. She has no additional complaints at this time.    12/19/22: Patient returns today for reevaluation for left hip pain. She states the preinjection she received on 9/8/2022 worked quite well for her up until recently. She presents today requesting repeat injection. She denies any further injuries. She has no additional complaints at this time.    9/8/22: Patient returns today for reevaluation for left hip and right ankle pain. In regards to her hip pain, she feels that physical therapy has not helped significantly. She continues to have pain that she localizes deep in her groin with radiation to the lateral aspect of her hip. This pain is worse with hip flexion and has progressed to pain with weightbearing activities. Regards to her ankle pain, she no longer localizes her pain along the Achilles tendon, however she does localize it to the deep and anterior aspect of her ankle. She states that this is associated with swelling that becomes progressively worse throughout weightbearing activities. She would like to discuss further treatment options.    5/9/22: Sharifa is a very  pleasant 77-year-old female who presented today 5/9/2022 for evaluation of left hip pain. She is known to me from previous visits for right knee arthritis. Today, she states that she has had about 2.5 months of left hip pain that started after a session on a stationary bike. She states that she was riding for approximately 10 minutes before feeling anterior left hip pain. She stopped riding and felt a constant soreness/achy type sensation rated 6/10 intensity. She was seen by her primary care doctor who obtained a radiograph of the left hip and recommended Tylenol for pain control. She states that the pain is not responding to rest alone but does temporarily improve with Tylenol. Pain is worse with movement and exercise, better with Tylenol and rest. At the worst she rates the pain 9/10 intensity anterior left hip. She has never had issues with her hip prior to this. She denies any paresthesias or back pain. She denies any skin changes, erythema, redness, bruising or swelling. She denies any other complaints today. Subsequently, she is complaining of right posterior ankle pain. I did evaluate this in the past when she had generalized ankle pain and stiffness consistent with degenerative arthritis. However, she states that she has developed worsening pain on the posterior aspect that she localizes along the mid substance of the Achilles tendon. She states that this has become progressively worse of the past 2 months as well. She denies acute trauma.       ROS: All pertinent positive symptoms are included in the history of present illness.    All other systems have been reviewed and are negative and noncontributory to this patient's current ailments.    Objective     There were no vitals filed for this visit.    Physical Exam  General/Constitutional: No apparent distress. Well-nourished and well developed.  Head: Normocephalic, Atraumatic.   Eyes: EOMI.  Vascular: No edema, swelling or tenderness, except as noted in  detailed exam.  Respiratory: Non-labored breathing.  Integumentary: No impressive skin lesions present, except as noted in detailed exam.  Neurological: Oriented to person, place, and time.  Psychological: Normal mood and affect.  Musculoskeletal: Normal, except as noted in detailed exam   The left hip and pelvis are without obvious signs of acute bony deformity, swelling, erythema, ecchymosis or instability. There is tenderness to palpation over the rectus femoris and psoas over the left anterior hip region. Active straight leg raise versus gravity reproduces her anterior hip pain, although she is able to hold the leg in extension with assistance. Seated hip flexion versus resistance also produces pain. Active and passive range of motion are full and pain-free. Logroll is negative.. Straight leg raise test is negative for pain or radicular symptoms. Thaddeus is negative. Crossover is negative. Hip strength is weak as compared to the opposite hip. The opposite hip is otherwise nontender and stable. Gait is antalgic and tandem.       Patient ID: Sharifa Moore is a 80 y.o. female.    L Inj/Asp: L hip joint on 3/28/2025 10:34 AM  Indications: pain  Details: 20 G needle, ultrasound-guided anterior approach  Medications: 80 mg triamcinolone acetonide 40 mg/mL; 4 mL lidocaine 10 mg/mL (1 %)  Outcome: tolerated well, no immediate complications  Procedure, treatment alternatives, risks and benefits explained, specific risks discussed. Consent was given by the patient. Immediately prior to procedure a time out was called to verify the correct patient, procedure, equipment, support staff and site/side marked as required. Patient was prepped and draped in the usual sterile fashion.           Assessment/Plan   Problem List Items Addressed This Visit    None  Visit Diagnoses       Osteoarthritis of left hip, unspecified osteoarthritis type        Relevant Orders    Point of Care Ultrasound (Completed)            Considering  that she has done well with previous injection, I feel that a repeat injection is warranted today.  Discussed risks versus benefits of having injection performed. Patient has elected to proceed with procedure. Please refer to procedure note above. Discussed with patient to limit weightbearing activities over the next 24-48 hours, they can then progress to full activities as tolerated. Discussed with patient to avoid water submersion over the next 2 days. Discussed with patient to call me immediately if they develop worsening pain, rash, erythema, or fevers.  I have placed a referral for total joint replacement for her.  I am more than happy to continue injection therapy for her as long as she would like.  She is aware that she cannot have a total hip replacement within 3 months of an injection.    Param Guzman, DO  Sports Medicine  [facility]     ** Please excuse any errors in grammar or translation related to this dictation. Voice recognition software was utilized to prepare this document. **

## 2025-04-04 ENCOUNTER — HOSPITAL ENCOUNTER (OUTPATIENT)
Dept: RADIOLOGY | Facility: EXTERNAL LOCATION | Age: 80
Discharge: HOME | End: 2025-04-04

## 2025-04-04 ENCOUNTER — HOSPITAL ENCOUNTER (OUTPATIENT)
Dept: RADIOLOGY | Facility: CLINIC | Age: 80
Discharge: HOME | End: 2025-04-04
Payer: MEDICARE

## 2025-04-04 ENCOUNTER — APPOINTMENT (OUTPATIENT)
Dept: ORTHOPEDIC SURGERY | Facility: CLINIC | Age: 80
End: 2025-04-04
Payer: MEDICARE

## 2025-04-04 DIAGNOSIS — M17.11 ARTHRITIS OF RIGHT KNEE: ICD-10-CM

## 2025-04-04 DIAGNOSIS — M17.11 ARTHRITIS OF RIGHT KNEE: Primary | ICD-10-CM

## 2025-04-04 PROCEDURE — 1036F TOBACCO NON-USER: CPT | Performed by: EMERGENCY MEDICINE

## 2025-04-04 PROCEDURE — 73564 X-RAY EXAM KNEE 4 OR MORE: CPT | Mod: RT

## 2025-04-04 PROCEDURE — 20611 DRAIN/INJ JOINT/BURSA W/US: CPT | Performed by: EMERGENCY MEDICINE

## 2025-04-04 PROCEDURE — 99213 OFFICE O/P EST LOW 20 MIN: CPT | Performed by: EMERGENCY MEDICINE

## 2025-04-04 PROCEDURE — 1123F ACP DISCUSS/DSCN MKR DOCD: CPT | Performed by: EMERGENCY MEDICINE

## 2025-04-04 PROCEDURE — 1125F AMNT PAIN NOTED PAIN PRSNT: CPT | Performed by: EMERGENCY MEDICINE

## 2025-04-04 PROCEDURE — 1159F MED LIST DOCD IN RCRD: CPT | Performed by: EMERGENCY MEDICINE

## 2025-04-04 RX ORDER — LIDOCAINE HYDROCHLORIDE 10 MG/ML
4 INJECTION, SOLUTION INFILTRATION; PERINEURAL
Status: COMPLETED | OUTPATIENT
Start: 2025-04-04 | End: 2025-04-04

## 2025-04-04 RX ORDER — TRIAMCINOLONE ACETONIDE 40 MG/ML
80 INJECTION, SUSPENSION INTRA-ARTICULAR; INTRAMUSCULAR
Status: COMPLETED | OUTPATIENT
Start: 2025-04-04 | End: 2025-04-04

## 2025-04-04 RX ADMIN — TRIAMCINOLONE ACETONIDE 80 MG: 40 INJECTION, SUSPENSION INTRA-ARTICULAR; INTRAMUSCULAR at 10:30

## 2025-04-04 RX ADMIN — LIDOCAINE HYDROCHLORIDE 4 ML: 10 INJECTION, SOLUTION INFILTRATION; PERINEURAL at 10:30

## 2025-04-04 ASSESSMENT — ENCOUNTER SYMPTOMS: KNEE SWELLING: 1

## 2025-04-04 ASSESSMENT — PAIN SCALES - GENERAL: PAINLEVEL_OUTOF10: 2

## 2025-04-04 NOTE — PROGRESS NOTES
Subjective   Sharifa Moore is a 80 y.o. female who presents for Follow-up of the Right Knee (CSI)    Right Knee       4/4/25: Patient returns today for right knee pain.  We did perform a right knee corticosteroid junction for her on 12/13/2024 that worked well up with her recently.  Her pain is since returned and become progressively worse.  Considering this, she would like a repeat injection performed today.  No additional complaints.    12/13/24: Patient returns today for right knee pain.  We did perform a right knee corticosteroid injection for her on 9/20/2024 for known DJD.  She felt this worked well up until recently.  Her pain is since returned and become progressively worse.  Considering this, she would like to have a repeat injection performed today.  No additional complaints or injuries.    9/20/24: Patient returns today for right knee pain.  We did perform a right knee corticosteroid injection for her on 6/28/2024.  She felt that it worked quite well for her up until recently.  Her pain has since returned and become progressively worse.  Considering this, she would like to have a repeat right knee corticosteroid injection today.  No additional complaints.     6/28/24: Patient returns today for right knee pain.  We did perform a right knee corticosteroid injection for her on 3/22/2024.  She felt that it worked quite well.  Her pain is since returned and become progressively worse.  Consider this, she would like to repeat injection performed today.  No additional complaints.    3/22/24: Patient returns today for right knee pain.  We did perform a right knee corticosteroid injection for her on 12/22/2023.  She felt that it were quite well and would like to have a repeat injection form today.  No additional complaints.    12/22/23: Patient returns today for right knee pain.  We performed a right knee corticosteroid injection on 7/28/2023.  She felt that this worked quite well for her and would like to  have a repeat injection performed today.  No additional complaints.    7/28/23: Patient returns today for right knee pain. She states the previous injection she received on 5/5/2023 worked quite well up until recently. She presents today requesting a repeat injection. She denies any further injuries.    5/5/23: Patient returns today for right knee pain. She states the previous injection she received on 1/13/2023 worked quite well for her up until recently. She would like to have a repeat injection today. She denies any further injuries. She has no additional complaints at this time.    1/13/23: Patient returns today for reevaluation for right knee pain. She states that the previous injection she received on 3/21/2022 did provide significant relief up until recently. She presents today requesting repeat injection. She denies any recent injuries.    3/21/22: Sharifa is a very pleasant 77-year-old female here today for evaluation of chronic right knee pain. She states that approximately 6 months ago her knee pain insidiously began to worsen without any acute injury or trauma. Referral by Dr Fernando. She does have a history of partial tear of her right Achilles 4 years ago however, she has no other history of right lower extremity pain. She has no surgical history for this knee. She describes an achy pain about her right anterior and lateral aspect of her knee that is worse with standing for too long, kneeling, bending, stairs, standing from a seated position and she does feel unstable at times. Pain rated as moderate in intensity, not associated with any swelling or erythema. She has tried naproxen 1 tab about 2-3 times a week, Tylenol arthritis and Vinny green herbal cream for pain relief. She denies any recent injury. She would like to discuss further treatment options today. No other complaints today.       ROS: All pertinent positive symptoms are included in the history of present illness.    All other systems have  been reviewed and are negative and noncontributory to this patient's current ailments.    Objective     There were no vitals filed for this visit.    Physical Exam  ysical Exam  General/Constitutional: No apparent distress. Well-nourished and well developed.  Head: Normocephalic, Atraumatic.   Eyes: EOMI.  Vascular: No edema, swelling or tenderness, except as noted in detailed exam.  Respiratory: Non-labored breathing.  Integumentary: No impressive skin lesions present, except as noted in detailed exam.  Neurological: Oriented to person, place, and time.  Psychological: Normal mood and affect.  Musculoskeletal: Normal, except as noted in detailed exam     The right knee is without obvious signs of acute bony deformity, swelling, erythema, ecchymosis or joint effusion. The patella is without tenderness. Apprehension is negative with medial and lateral glide. Patella crepitus is negative. Patella grind is positive. The medial joint line is nontender and without bony crepitus or step-off. The lateral joint line is TTP and without bony crepitus or step-off. Flexion & extension are full and symmetrical. Varus & valgus stress test at 0° and 30° of flexion, Lachman's, Bj's, Apley's, dial test at 90° and 30° of flexion, and posterior drawer are all negative. The opposite knee is nontender and stable. Gait is pain-free and tandem.    Patient ID: Sharifa Moore is a 80 y.o. female.    L Inj/Asp: R knee on 4/4/2025 10:30 AM  Indications: pain  Details: 25 G needle, ultrasound-guided superolateral approach  Medications: 80 mg triamcinolone acetonide 40 mg/mL; 4 mL lidocaine 10 mg/mL (1 %)  Outcome: tolerated well, no immediate complications  Procedure, treatment alternatives, risks and benefits explained, specific risks discussed. Consent was given by the patient. Immediately prior to procedure a time out was called to verify the correct patient, procedure, equipment, support staff and site/side marked as required.  Patient was prepped and draped in the usual sterile fashion.             Assessment/Plan   Problem List Items Addressed This Visit    None  Visit Diagnoses       Arthritis of right knee        Relevant Orders    XR knee right 4+ views    Point of Care Ultrasound (Completed)          Considering that she has done well with previous injections, I feel that a repeat injection today is warranted. Discussed risks versus benefits of having injection performed. Patient has elected to proceed with procedure. Please refer to procedure note above. Discussed with patient to limit weightbearing activities over the next 24-48 hours, they can then progress to full activities as tolerated. Discussed with patient to avoid water submersion over the next 2 days. Discussed with patient to call me immediately if they develop worsening pain, rash, erythema, or fevers. Patient should follow-up as needed if pain persists or worsens.    Param Guzman, DO  Sports Medicine  [facility]     ** Please excuse any errors in grammar or translation related to this dictation. Voice recognition software was utilized to prepare this document. **

## 2025-04-18 ENCOUNTER — HOSPITAL ENCOUNTER (OUTPATIENT)
Dept: RADIOLOGY | Facility: CLINIC | Age: 80
Discharge: HOME | End: 2025-04-18
Payer: MEDICARE

## 2025-04-18 VITALS — HEIGHT: 62 IN | BODY MASS INDEX: 29.33 KG/M2 | WEIGHT: 159.4 LBS

## 2025-04-18 DIAGNOSIS — Z12.31 ENCOUNTER FOR SCREENING MAMMOGRAM FOR MALIGNANT NEOPLASM OF BREAST: ICD-10-CM

## 2025-04-18 PROCEDURE — 77067 SCR MAMMO BI INCL CAD: CPT

## 2025-05-21 ENCOUNTER — APPOINTMENT (OUTPATIENT)
Dept: ORTHOPEDIC SURGERY | Facility: CLINIC | Age: 80
End: 2025-05-21
Payer: MEDICARE

## 2025-05-21 ENCOUNTER — HOSPITAL ENCOUNTER (OUTPATIENT)
Dept: RADIOLOGY | Facility: CLINIC | Age: 80
Discharge: HOME | End: 2025-05-21
Payer: MEDICARE

## 2025-05-21 VITALS — BODY MASS INDEX: 29.26 KG/M2 | HEIGHT: 62 IN | WEIGHT: 159 LBS

## 2025-05-21 DIAGNOSIS — M25.552 LEFT HIP PAIN: ICD-10-CM

## 2025-05-21 DIAGNOSIS — M16.12 PRIMARY OSTEOARTHRITIS OF LEFT HIP: Primary | ICD-10-CM

## 2025-05-21 PROCEDURE — 1159F MED LIST DOCD IN RCRD: CPT | Performed by: ORTHOPAEDIC SURGERY

## 2025-05-21 PROCEDURE — 1036F TOBACCO NON-USER: CPT | Performed by: ORTHOPAEDIC SURGERY

## 2025-05-21 PROCEDURE — 1125F AMNT PAIN NOTED PAIN PRSNT: CPT | Performed by: ORTHOPAEDIC SURGERY

## 2025-05-21 PROCEDURE — 73502 X-RAY EXAM HIP UNI 2-3 VIEWS: CPT | Mod: LT

## 2025-05-21 PROCEDURE — 99213 OFFICE O/P EST LOW 20 MIN: CPT | Performed by: ORTHOPAEDIC SURGERY

## 2025-05-21 PROCEDURE — 73502 X-RAY EXAM HIP UNI 2-3 VIEWS: CPT | Mod: LEFT SIDE | Performed by: RADIOLOGY

## 2025-05-21 ASSESSMENT — PAIN DESCRIPTION - DESCRIPTORS: DESCRIPTORS: ACHING

## 2025-05-21 ASSESSMENT — PAIN - FUNCTIONAL ASSESSMENT: PAIN_FUNCTIONAL_ASSESSMENT: 0-10

## 2025-05-21 ASSESSMENT — PAIN SCALES - GENERAL: PAINLEVEL_OUTOF10: 8

## 2025-05-21 NOTE — PROGRESS NOTES
Patient is a pleasant 80-year-old female presents today for evaluation of left hip osteoarthritis.  She has had multiple injections over the past years.  She takes Tylenol.  She rates her pain at times is 9 out of 10.  She has difficulty walking certain distance or putting on her shoes and socks.  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    Left hip:  AAOx3, NAD, walks with a significant antalgic gait  Significant pain with flexion internal rotation  Positive [facility]  Mild TTP over trochanter laterally  5/5 Hip flexion/knee extension/df/pf/ehl  SILT in a sumaya/saph/per/tib distribution  ½ dorsalis pedis and posterior tibial pulse  no popliteal or inguinal lymphadenopathy  no other overlying lesions  mood: euthymic  Respirations non labored    Plain films are reviewed by myself in clinic today.  She has severe osteoarthritis of her left hip with complete loss of her superior joint space, protrusio, erosion of her femoral head.    We discussed further conservative treatment versus surgery with her today in clinic.  Will really come down her quality of life and how she would like to proceed.  She was given my office card and number and can call to schedule surgery at her convenience which is indicated at this time.  We would see her back at a time for a full surgical consult.  All of her questions were answered.    M16.12  [id number]  [facility] [place]  Overnight  [employer] [place] stem backup, cancellous croutons    This note was created using voice recognition software and was not corrected for typographical or grammatical errors.

## 2025-05-26 ENCOUNTER — TRANSCRIBE ORDERS (OUTPATIENT)
Dept: OPERATING ROOM | Facility: HOSPITAL | Age: 80
End: 2025-05-26
Payer: MEDICARE

## 2025-05-26 DIAGNOSIS — M16.12 UNILATERAL PRIMARY OSTEOARTHRITIS, LEFT HIP: Primary | ICD-10-CM

## 2025-05-27 PROBLEM — M16.12 UNILATERAL PRIMARY OSTEOARTHRITIS, LEFT HIP: Status: ACTIVE | Noted: 2025-05-26

## 2025-06-12 ENCOUNTER — OFFICE VISIT (OUTPATIENT)
Dept: ORTHOPEDIC SURGERY | Facility: CLINIC | Age: 80
End: 2025-06-12
Payer: MEDICARE

## 2025-06-12 DIAGNOSIS — M16.12 OSTEOARTHRITIS OF LEFT HIP, UNSPECIFIED OSTEOARTHRITIS TYPE: Primary | ICD-10-CM

## 2025-06-12 PROCEDURE — 1159F MED LIST DOCD IN RCRD: CPT | Performed by: ORTHOPAEDIC SURGERY

## 2025-06-12 PROCEDURE — 1036F TOBACCO NON-USER: CPT | Performed by: ORTHOPAEDIC SURGERY

## 2025-06-12 PROCEDURE — 99212 OFFICE O/P EST SF 10 MIN: CPT | Performed by: ORTHOPAEDIC SURGERY

## 2025-06-12 PROCEDURE — 99214 OFFICE O/P EST MOD 30 MIN: CPT | Performed by: ORTHOPAEDIC SURGERY

## 2025-06-12 NOTE — PROGRESS NOTES
Patient is a 80-year-old female presents today for discussion of upcoming left total hip arthroplasty having decided to proceed with surgery.  She is failed a greater than 3-month trial of reasonable conservative treatment including injections, meloxicam and Tylenol.  She rates her pain as 8 out of 10.  She has difficulty walking certain distance or putting on her shoes and socks.  She is extremely fed up with her quality of life and would like to proceed with surgery which is indicated at this time.    Left hip:    AAOx3, NAD, walks with a moderate antalgic gait  Significant pain with flexion internal rotation  Positive [facility]  Mild TTP over trochanter laterally  5/5 Hip flexion/knee extension/df/pf/ehl  SILT in a sumaya/saph/per/tib distribution  ½ dorsalis pedis and posterior tibial pulse  no popliteal or inguinal lymphadenopathy  no other overlying lesions  mood: euthymic  Respirations non    Plain films were reviewed by myself in clinic today.  They have advanced osteoarthritis of their hip with significant joint space narrowing, bone on bone changes, underlying sclerosis and bipolar osteophytic change.    Patient is now failed a greater than 3-month trial of reasonable conservative treatment and wishes to proceed with total hip arthroplasty which is indicated at this time.  We discussed the risk benefits alternatives to surgery.  All of their questions were answered.    Procedure: left total hip  Location: [facility] [place]  ICD10: M16.12  CPT: 64279  Stay: overnight  Equipment: C stem, [place]    I talked with the patient at length about risks, limitations, benefits and alternatives to total hip replacement today. I reviewed concerns about implant wear, loosening, breakage, infection, infection prophylaxis, DVT, PE, death and other medical and anesthetic complications of surgery. We talked about the potential for persistent pain following surgery since there are many possible causes for hip and leg pain. The patient was  advised that hip replacement will only relieve pain that is coming from the hip. We talked about leg length discrepancy, neurovascular problems and dislocation after surgery. The patient understands that we may have to lengthen the leg slightly to provide for adequate stability of the hip. I reviewed dislocation precautions and activity restrictions in detail. We discussed the concerns about intraoperative fracture, ingrowth failure, thigh pain and possible post-operative weight bearing restrictions following cementless hip replacement. We discussed advantages and disadvantages of different surgical approaches. We discussed the possible need for a homologous blood transfusion. We discussed the fact that many of our patients are able to go home in 1 day or less depending on their health, mobility, pre-op preparation, individual home situation and personal preference. The patient has identified their personal goals of their joint replacement surgery and recovery and we have discussed them. These are documented in our [employer] patient engagement platform. In addition, we have discussed the advantages and disadvantages of various implant and fixation options, as well as various surgical approaches.  The basic concepts of the joint replacement procedure has been reviewed with the patient and the patient has been provided the opportunity to see an actual implant either in the office or in our pre-op education class. All of the patients questions were answered. The patient can call my office to schedule surgery and the pre-op teaching class. I told the patient that they should contact their primary care physician to discuss fitness for surgery.     This note was created using voice recognition software and was not corrected for typographical or grammatical errors.

## 2025-06-18 ENCOUNTER — HOSPITAL ENCOUNTER (OUTPATIENT)
Dept: RADIOLOGY | Facility: HOSPITAL | Age: 80
Discharge: HOME | End: 2025-06-18
Payer: MEDICARE

## 2025-06-18 ENCOUNTER — PRE-ADMISSION TESTING (OUTPATIENT)
Dept: PREADMISSION TESTING | Facility: HOSPITAL | Age: 80
End: 2025-06-18
Payer: MEDICARE

## 2025-06-18 VITALS
OXYGEN SATURATION: 98 % | HEART RATE: 78 BPM | RESPIRATION RATE: 16 BRPM | WEIGHT: 160 LBS | BODY MASS INDEX: 29.44 KG/M2 | TEMPERATURE: 97.4 F | HEIGHT: 62 IN | SYSTOLIC BLOOD PRESSURE: 157 MMHG | DIASTOLIC BLOOD PRESSURE: 80 MMHG

## 2025-06-18 DIAGNOSIS — M16.12 UNILATERAL PRIMARY OSTEOARTHRITIS, LEFT HIP: ICD-10-CM

## 2025-06-18 DIAGNOSIS — R79.9 ABNORMAL BLOOD CHEMISTRY: ICD-10-CM

## 2025-06-18 DIAGNOSIS — Z01.818 PREOP TESTING: Primary | ICD-10-CM

## 2025-06-18 LAB
ALBUMIN SERPL BCP-MCNC: 4.4 G/DL (ref 3.4–5)
ALP SERPL-CCNC: 83 U/L (ref 33–136)
ALT SERPL W P-5'-P-CCNC: 24 U/L (ref 7–45)
ANION GAP SERPL CALC-SCNC: 14 MMOL/L (ref 10–20)
AST SERPL W P-5'-P-CCNC: 21 U/L (ref 9–39)
BASOPHILS # BLD AUTO: 0.03 X10*3/UL (ref 0–0.1)
BASOPHILS NFR BLD AUTO: 0.4 %
BILIRUB SERPL-MCNC: 1 MG/DL (ref 0–1.2)
BUN SERPL-MCNC: 17 MG/DL (ref 6–23)
CALCIUM SERPL-MCNC: 9.7 MG/DL (ref 8.6–10.3)
CHLORIDE SERPL-SCNC: 103 MMOL/L (ref 98–107)
CO2 SERPL-SCNC: 25 MMOL/L (ref 21–32)
CREAT SERPL-MCNC: 0.61 MG/DL (ref 0.5–1.05)
EGFRCR SERPLBLD CKD-EPI 2021: >90 ML/MIN/1.73M*2
EOSINOPHIL # BLD AUTO: 0.13 X10*3/UL (ref 0–0.4)
EOSINOPHIL NFR BLD AUTO: 1.7 %
ERYTHROCYTE [DISTWIDTH] IN BLOOD BY AUTOMATED COUNT: 14.7 % (ref 11.5–14.5)
EST. AVERAGE GLUCOSE BLD GHB EST-MCNC: 117 MG/DL
GLUCOSE SERPL-MCNC: 93 MG/DL (ref 74–99)
HBA1C MFR BLD: 5.7 % (ref ?–5.7)
HCT VFR BLD AUTO: 41.4 % (ref 36–46)
HGB BLD-MCNC: 13.5 G/DL (ref 12–16)
IMM GRANULOCYTES # BLD AUTO: 0.01 X10*3/UL (ref 0–0.5)
IMM GRANULOCYTES NFR BLD AUTO: 0.1 % (ref 0–0.9)
LYMPHOCYTES # BLD AUTO: 2.04 X10*3/UL (ref 0.8–3)
LYMPHOCYTES NFR BLD AUTO: 27.4 %
MCH RBC QN AUTO: 29.2 PG (ref 26–34)
MCHC RBC AUTO-ENTMCNC: 32.6 G/DL (ref 32–36)
MCV RBC AUTO: 90 FL (ref 80–100)
MONOCYTES # BLD AUTO: 0.78 X10*3/UL (ref 0.05–0.8)
MONOCYTES NFR BLD AUTO: 10.5 %
NEUTROPHILS # BLD AUTO: 4.45 X10*3/UL (ref 1.6–5.5)
NEUTROPHILS NFR BLD AUTO: 59.9 %
NRBC BLD-RTO: ABNORMAL /100{WBCS}
PLATELET # BLD AUTO: 256 X10*3/UL (ref 150–450)
POTASSIUM SERPL-SCNC: 3.5 MMOL/L (ref 3.5–5.3)
PROT SERPL-MCNC: 6.7 G/DL (ref 6.4–8.2)
RBC # BLD AUTO: 4.62 X10*6/UL (ref 4–5.2)
SODIUM SERPL-SCNC: 138 MMOL/L (ref 136–145)
WBC # BLD AUTO: 7.4 X10*3/UL (ref 4.4–11.3)

## 2025-06-18 PROCEDURE — 73502 X-RAY EXAM HIP UNI 2-3 VIEWS: CPT | Mod: LT

## 2025-06-18 PROCEDURE — 84075 ASSAY ALKALINE PHOSPHATASE: CPT

## 2025-06-18 PROCEDURE — 99204 OFFICE O/P NEW MOD 45 MIN: CPT

## 2025-06-18 PROCEDURE — 87081 CULTURE SCREEN ONLY: CPT | Mod: BEALAB

## 2025-06-18 PROCEDURE — 73502 X-RAY EXAM HIP UNI 2-3 VIEWS: CPT | Mod: LEFT SIDE | Performed by: RADIOLOGY

## 2025-06-18 PROCEDURE — 83036 HEMOGLOBIN GLYCOSYLATED A1C: CPT | Mod: BEALAB

## 2025-06-18 PROCEDURE — 85025 COMPLETE CBC W/AUTO DIFF WBC: CPT

## 2025-06-18 PROCEDURE — 36415 COLL VENOUS BLD VENIPUNCTURE: CPT

## 2025-06-18 PROCEDURE — 93005 ELECTROCARDIOGRAM TRACING: CPT

## 2025-06-18 RX ORDER — ASCORBIC ACID 500 MG
500 TABLET ORAL DAILY
COMMUNITY

## 2025-06-18 RX ORDER — CHLORHEXIDINE GLUCONATE ORAL RINSE 1.2 MG/ML
15 SOLUTION DENTAL DAILY
Qty: 30 ML | Refills: 0 | Status: SHIPPED | OUTPATIENT
Start: 2025-06-18 | End: 2025-06-20

## 2025-06-18 RX ORDER — CHOLECALCIFEROL (VITAMIN D3) 25 MCG
25 TABLET ORAL DAILY
COMMUNITY

## 2025-06-18 RX ORDER — CHLORHEXIDINE GLUCONATE 40 MG/ML
SOLUTION TOPICAL DAILY
Qty: 470 ML | Refills: 0 | Status: SHIPPED | OUTPATIENT
Start: 2025-06-18 | End: 2025-06-23

## 2025-06-18 ASSESSMENT — PAIN SCALES - GENERAL: PAINLEVEL_OUTOF10: 2

## 2025-06-18 ASSESSMENT — PAIN - FUNCTIONAL ASSESSMENT: PAIN_FUNCTIONAL_ASSESSMENT: 0-10

## 2025-06-18 ASSESSMENT — DUKE ACTIVITY SCORE INDEX (DASI)
CAN YOU TAKE CARE OF YOURSELF (EAT, DRESS, BATHE, OR USE TOILET): YES
TOTAL_SCORE: 24.2
CAN YOU CLIMB A FLIGHT OF STAIRS OR WALK UP A HILL: YES
CAN YOU DO HEAVY WORK AROUND THE HOUSE LIKE SCRUBBING FLOORS OR LIFTING AND MOVING HEAVY FURNITURE: NO
DASI METS SCORE: 5.7
CAN YOU WALK INDOORS, SUCH AS AROUND YOUR HOUSE: YES
CAN YOU DO YARD WORK LIKE RAKING LEAVES, WEEDING OR PUSHING A MOWER: NO
CAN YOU DO LIGHT WORK AROUND THE HOUSE LIKE DUSTING OR WASHING DISHES: YES
CAN YOU PARTICIPATE IN MODERATE RECREATIONAL ACTIVITIES LIKE GOLF, BOWLING, DANCING, DOUBLES TENNIS OR THROWING A BASEBALL OR FOOTBALL: NO
CAN YOU DO MODERATE WORK AROUND THE HOUSE LIKE VACUUMING, SWEEPING FLOORS OR CARRYING GROCERIES: YES
CAN YOU RUN A SHORT DISTANCE: NO
CAN YOU HAVE SEXUAL RELATIONS: YES
CAN YOU WALK A BLOCK OR TWO ON LEVEL GROUND: YES
CAN YOU PARTICIPATE IN STRENOUS SPORTS LIKE SWIMMING, SINGLES TENNIS, FOOTBALL, BASKETBALL, OR SKIING: NO

## 2025-06-18 NOTE — PREPROCEDURE INSTRUCTIONS
Medication List            Accurate as of June 18, 2025  3:26 PM. Always use your most recent med list.                ascorbic acid 500 mg tablet  Commonly known as: Vitamin C  Additional Medication Adjustments for Surgery: Take last dose 7 days before surgery  Notes to patient: Last dose preoperatively 7/6/2025     atorvastatin 10 mg tablet  Commonly known as: Lipitor  Take 1 tablet (10 mg) by mouth once daily.  Medication Adjustments for Surgery: Take/Use as prescribed     * chlorhexidine 4 % external liquid  Commonly known as: Hibiclens  Apply topically once daily for 5 days.  Medication Adjustments for Surgery: Take/Use as prescribed     * chlorhexidine 0.12 % solution  Commonly known as: Peridex  Use 15 mL in the mouth or throat once daily for 2 doses. 15 ML night before surgery and 15 ML morning of surgery. Swish and spit  Medication Adjustments for Surgery: Take/Use as prescribed     clindamycin 300 mg capsule  Commonly known as: Cleocin  Medication Adjustments for Surgery: Take/Use as prescribed     cyclobenzaprine 10 mg tablet  Commonly known as: Flexeril  Take 1 tablet (10 mg) by mouth as needed at bedtime for muscle spasms.  Medication Adjustments for Surgery: Take/Use as prescribed     fexofenadine 180 mg tablet  Commonly known as: Allegra  Medication Adjustments for Surgery: Take/Use as prescribed     ibuprofen 800 mg tablet  Take 1 tablet (800 mg) by mouth every 8 hours if needed for moderate pain (4 - 6).  Additional Medication Adjustments for Surgery: Take last dose 7 days before surgery  Notes to patient: Last dose preoperatively 7/6/2025     meloxicam 15 mg tablet  Commonly known as: Mobic  Take 1 tablet (15 mg) by mouth once daily as needed for moderate pain (4 - 6).  Additional Medication Adjustments for Surgery: Take last dose 7 days before surgery  Notes to patient: Last dose preoperatively 7/6/2025     Tylenol Arthritis Pain 650 mg ER tablet  Generic drug: acetaminophen  Medication  Adjustments for Surgery: Take/Use as prescribed     Vitamin D3 25 mcg (1,000 units) tablet  Generic drug: cholecalciferol  Additional Medication Adjustments for Surgery: Take last dose 7 days before surgery  Notes to patient: Last dose preoperatively 7/6/2025           * This list has 2 medication(s) that are the same as other medications prescribed for you. Read the directions carefully, and ask your doctor or other care provider to review them with you.                NPO Instructions:     Do not eat any food after midnight the night before your surgery/procedure.  You may have clear liquids until TWO hours before surgery/procedure. This includes water, black tea/coffee, (no milk or cream) apple juice and electrolyte drinks (Gatorade).  You may chew gum up to TWO hours before your surgery/procedure.     Additional Instructions:      Seven/Six Days before Surgery:  Review your medication instructions, stop indicated medications  Five Days before Surgery:  Review your medication instructions, stop indicated medications  Begin using your Hibiclens  Three Days before Surgery:  Review your medication instructions, stop indicated medications  The Day before Surgery:  Start using 0.12% CHG mouthwash  No smoking or alcohol use 24 hours before surgery  Review your medication instructions, stop indicated medications  You will be contacted regarding the time of your arrival to facility and surgery time  Do not eat any food after Midnight  Day of Surgery:  Review your medication instructions, take indicated medications  If you have diabetes, please check your fasting blood sugar upon awakening.  If fasting blood sugar is <80 mg/dl, drink 100 ml of apple juice, time limit of 2 hours before  You may have clear liquids until TWO hours before surgery/procedure.  This includes water, black tea/coffee, (no milk or cream) apple juice and electrolyte drinks (Gatorade)  You may chew gum up to TWO hours before your  surgery/procedure  Wear  comfortable loose fitting clothing  Do not use moisturizers, creams, lotions or perfume  All jewelry and valuables should be left at home     CONTACT SURGEON'S OFFICE IF YOU DEVELOP:  * Fever = 100.4 F   * New respiratory symptoms (e.g. cough, shortness of breath, respiratory distress, sore throat)  * Recent loss of taste or smell  *Flu like symptoms such as headache, fatigue or gastrointestinal symptoms  * You develop any open sores, shingles, burning or painful urination   AND/OR:  * You no longer wish to have the surgery.  * Any other personal circumstances change that may lead to the need to cancel or defer this surgery.  *You were admitted to any hospital within one week of your planned procedure.     SMOKING:  *Quitting smoking can make a huge difference to your health and recovery from surgery.    *If you need help with quitting, call 6-021-QUIT-NOW.     THE DAY BEFORE SURGERY:  *Do not eat any food after midnight the night before your surgery.   *You may have up to TEN OUNCES of clear liquids until TWO hours before your instructed ARRIVAL TIME to hospital. This includes water, black tea/coffee, (no milk or cream) apple juice, clear broth and electrolyte drinks (Gatorade). Please avoid clear liquids that are red in color.   *You may chew gum/mints up to TWO hours before your surgery/procedure.     SURGICAL TIME:  *You will be contacted between 2 p.m. and 3 p.m. the business day before your surgery with your arrival time.  *If you haven't received a call by 3pm, call [phone]  *Scheduled surgery times may change and you will be notified if this occurs-check your personal voicemail for any updates.     ON THE MORNING OF SURGERY:  *Wear comfortable, loose fitting clothing.   *Do not use moisturizers, creams, lotions or perfume.  *All jewelry and valuables should be left at home.  *Prosthetic devices such as contact lenses, hearing aids, dentures, eyelash extensions, hairpins and  body piercing must be removed before surgery.     BRING WITH YOU:  *Photo ID and insurance card  *Current list of medications and allergies  *Pacemaker/Defibrillator/Heart stent cards  *CPAP machine and mask  *Slings/splints/crutches  *Copy of your complete Advanced Directive/DHPOA-if applicable  *Neurostimulator implant remote     PARKING AND ARRIVAL:  *Check in at the Main Entrance desk and let them know you are here for surgery.     IF YOU ARE HAVING OUTPATIENT/SAME DAY SURGERY:  *A responsible adult MUST accompany you at the time of discharge and stay with you for 24 hours after your surgery.  *You may NOT drive yourself home after surgery.  *You may use a taxi or ride sharing service ([employer], [employer]) to return home ONLY if you are accompanied by a friend or family member.  *Instructions for resuming your medications will be provided by your surgeon.     Thank you for coming to Pre Admission testing.      If I have prescribed medication please don't forget to  at your pharmacy.      Any questions about today's visit call [phone] and leave a message in the general mailbox.     Patient instructed to ambulate as soon as possible postoperatively to decrease thromboembolic risk.     Arnulfo Bangura, APRN-CNP     Thank you for visiting [facility].  If you have any changes to your health condition, please call the surgeons office to alert them and give them details of your symptoms.        Preoperative Fasting Guidelines     Why must I stop eating and drinking near surgery time?  With sedation, food or liquid in your stomach can enter your lungs causing serious complications  Increases nausea and vomiting     When do I need to stop eating and drinking before my surgery?  Do not eat any food after midnight the night before your surgery/procedure.  You may have up to TEN ounces of clear liquid until TWO hours before your instructed arrival time to the hospital.  This includes water,  black tea/coffee, (no milk or cream) apple juice, and electrolyte drinks (Gatorade)  You may chew gum until TWO hours before your surgery/procedure        Additional Instructions:      The Day before Surgery:  -Review your medication instructions, stop indicated medications  -You will be contacted in the evening regarding the time of your arrival to facility and surgery time     Day of Surgery:  -Review your medication instructions, take indicated medications  -Wear comfortable loose fitting clothing  -Do not use moisturizers, creams, lotions or perfume  -All jewelry and valuables should be left at home                   Preoperative Brain Exercises     What are brain exercises?  A brain exercise is any activity that engages your thinking (cognitive) skills.     What types of activities are considered brain exercises?  Jigsaw puzzles, crossword puzzles, word jumble, memory games, word search, and many more.  Many can be found free online or on your phone via a mobile arleth.     Why should I do brain exercises before my surgery?  More recent research has shown brain exercise before surgery can lower the risk of postoperative delirium (confusion) which can be especially important for older adults.  Patients who did brain exercises for 5 to 10 minutes/day in the days before surgery, cut their risk of postoperative delirium in half up to 1 week after surgery.                         [facility]     Preoperative Deep Breathing Exercises     Why it is important to do deep breathing exercises before my surgery?  Deep breathing exercises strengthen your breathing muscles.  This helps you to recover after your surgery and decreases the chance of breathing complications.        How are the deep breathing exercises done?  Sit straight with your back supported.  Breathe in deeply and slowly through your nose. Your lower rib cage should expand and your abdomen may move forward.  Hold that breath for 3 to  5 seconds.  Breathe out through pursed lips, slowly and completely.  Rest and repeat 10 times every hour while awake.  Rest longer if you become dizzy or lightheaded.                      [facility]     Preoperative Deep Breathing Exercises     Why it is important to do deep breathing exercises before my surgery?  Deep breathing exercises strengthen your breathing muscles.  This helps you to recover after your surgery and decreases the chance of breathing complications.        How are the deep breathing exercises done?  Sit straight with your back supported.  Breathe in deeply and slowly through your nose. Your lower rib cage should expand and your abdomen may move forward.  Hold that breath for 3 to 5 seconds.  Breathe out through pursed lips, slowly and completely.  Rest and repeat 10 times every hour while awake.  Rest longer if you become dizzy or lightheaded.        Patient Information: Incentive Spirometer  What is an incentive spirometer?  An incentive spirometer is a device used before and after surgery to “exercise” your lungs.  It helps you to take deeper breaths to expand your lungs.  Below is an example of a basic incentive spirometer.  The device you receive may differ slightly but they all function the same.    Why do I need to use an incentive spirometer?  Using your incentive spirometer prepares your lungs for surgery and helps prevent lung problems after surgery.  How do I use my incentive spirometer?  When you're using your incentive spirometer, make sure to breathe through your mouth. If you breathe through your nose, the incentive spirometer won't work properly. You can hold your nose if you have trouble.  If you feel dizzy at any time, stop and rest. Try again at a later time.  Follow the steps below:  Set up your incentive spirometer, expand the flexible tubing and connect to the outlet.  Sit upright in a chair or bed. Hold the incentive spirometer at eye level.   Put  the mouthpiece in your mouth and close your lips tightly around it. Slowly breathe out (exhale) completely.  Breathe in (inhale) slowly through your mouth as deeply as you can. As you take a breath, you will see the piston rise inside the large column. While the piston rises, the indicator should move upwards. It should stay in between the 2 arrows (see Figure).  Try to get the piston as high as you can, while keeping the indicator between the arrows.   If the indicator doesn't stay between the arrows, you're breathing either too fast or too slow.  When you get it as high as you can, hold your breath for 10 seconds, or as long as possible. While you're holding your breath, the piston will slowly fall to the base of the spirometer.  Once the piston reaches the bottom of the spirometer, breathe out slowly through your mouth. Rest for a few seconds.  Repeat 10 times. Try to get the piston to the same level with each breath.  Repeat every hour while awake  You can carefully clean the outside of the mouthpiece with an alcohol wipe or soap and water.       Patient and Family Education             Ways You Can Help Prevent Blood Clots                    This handout explains some simple things you can do to help prevent blood clots.      Blood clots are blockages that can form in the body's veins. When a blood clot forms in your deep veins, it may be called a deep vein thrombosis, or DVT for short. Blood clots can happen in any part of the body where blood flows, but they are most common in the arms and legs. If a piece of a blood clot breaks free and travels to the lungs, it is called a pulmonary embolus (PE). A PE can be a very serious problem.         Being in the hospital or having surgery can raise your chances of getting a blood clot because you may not be well enough to move around as much as you normally do.         Ways you can help prevent blood clots in the hospital           Wearing SCDs. SCDs stands for  Sequential Compression Devices.   SCDs are special sleeves that wrap around your legs  They attach to a pump that fills them with air to gently squeeze your legs every few minutes.   This helps return the blood in your legs to your heart.   SCDs should only be taken off when walking or bathing.   SCDs may not be comfortable, but they can help save your life.                                            Wearing compression stockings - if your doctor orders them. These special snug fitting stockings gently squeeze your legs to help blood flow.       Walking. Walking helps move the blood in your legs.   If your doctor says it is ok, try walking the halls at least   5 times a day. Ask us to help you get up, so you don't fall.      Taking any blood thinning medicines your doctor orders.        Page 1 of 2            [facility]; 3/23   Ways you can help prevent blood clots at home         Wearing compression stockings - if your doctor orders them. ? Walking - to help move the blood in your legs.       Taking any blood thinning medicines your doctor orders.      Signs of a blood clot or PE        Tell your doctor or nurse know right away if you have of the problems listed below.    If you are at home, seek medical care right away. Call 911 for chest pain or problems breathing.                Signs of a blood clot (DVT) - such as pain,  swelling, redness or warmth in your arm or leg      Signs of a pulmonary embolism (PE) - such as chest pain or feeling short of breath    Patient Information: Pre-Operative Infection Prevention Measures     Why did I have my nose, under my arms, and groin swabbed?  The purpose of the swab is to identify Staphylococcus aureus inside your nose or on your skin.  The swab was sent to the laboratory for culture.  A positive swab/culture for Staphylococcus aureus is called colonization or carriage.      What is Staphylococcus aureus?  Staphylococcus aureus, also known as “staph”, is a  germ found on the skin or in the nose of healthy people.  Sometimes Staphylococcus aureus can get into the body and cause an infection.  This can be minor (such as pimples, boils, or other skin problems).  It might also be serious (such as a blood infection, pneumonia, or a surgical site infection).    What is Staphylococcus aureus colonization or carriage?  Colonization or carriage means that a person has the germ but is not sick from it.  These bacteria can be spread on the hands or when breathing or sneezing.    How is Staphylococcus aureus spread?  It is most often spread by close contact with a person or item that carries it.    What happens if my culture is positive for Staphylococcus aureus?  Your doctor/medical team will use this information to guide any antibiotic treatment which may be necessary.  Regardless of the culture results, we will clean the inside of your nose with a betadine swab just before you have your surgery.      Will I get an infection if I have Staphylococcus aureus in my nose or on my skin?  Anyone can get an infection with Staphylococcus aureus.  However, the best way to reduce your risk of infection is to follow the instructions provided to you for the use of your CHG soap and dental rinse.        Patient Information: Oral/Dental Rinse    What is oral/dental rinse?   It is a mouthwash. It is a way of cleaning the mouth with a germ-killing solution before your surgery.  The solution contains chlorhexidine, commonly known as CHG.   It is used inside the mouth to kill a bacteria known as Staphylococcus aureus.  Let your doctor know if you are allergic to Chlorhexidine.    We have sent a prescription for CHG 0.12% mouthwash to your preferred pharmacy.  If you have not already, Please  your prescription and start using the day before before surgery.  Follow the instruction sheet provided to you at your CPM/PAT appointment. Please contact [facility] if you do not receive your CHG  mouthwash prescription.     Why do I need to use CHG oral/dental rinse?  The CHG oral/dental rinse helps to kill a bacteria in your mouth known as Staphylococcus aureus.     This reduces the risk of infection at the surgical site.      Using your CHG oral/dental rinse  STEPS:  Use your CHG oral/dental rinse after you brush your teeth the night before (at bedtime) and the morning of your surgery.  Follow all directions on your prescription label.    Use the cap on the container to measure 15ml   Swish (gargle if you can) the mouthwash in your mouth for at least 30 seconds, (do not swallow) and spit out  After you use your CHG rinse, do not rinse your mouth with water, drink or eat.  Please refer to the prescription label for the appropriate time to resume oral intake      What side effects might I have using the CHG oral/dental rinse?  CHG rinse will stick to plaque on the teeth.  Brush and floss just before use.  Teeth brushing will help avoid staining of plaque during use.      Patient Information: Home Preoperative Antibacterial Shower      What is a home preoperative antibacterial shower?  This shower is a way of cleaning the skin with a germ-killing solution before surgery.  The solution contains chlorhexidine, commonly known as CHG.  CHG is a skin cleanser with germ-killing ability.  Let your doctor know if you are allergic to chlorhexidine.    Why do I need to take a preoperative antibacterial shower?  Skin is not sterile.  It is best to try to make your skin as free of germs as possible before surgery.  Proper cleansing with a germ-killing soap before surgery can lower the number of germs on your skin.  This helps to reduce the risk of infection at the surgical site.  Following the instructions listed below will help you prepare your skin for surgery.      How do I use the solution?  Steps:  Begin using your CHG soap 5 days before your scheduled surgery on 7/9/2025.    First, wash and rinse your hair using  the CHG soap. Keep CHG soap away from ear canals and eyes.  Rinse completely, do not condition.  Hair extensions should be removed.  Wash your face with your normal soap and rinse.    Apply the CHG solution to a clean wet washcloth.  Turn the water off or move away from the water spray to avoid premature rinsing of the CHG soap as you are applying.   Firmly lather your entire body from the neck down.  Do not use on your face.  Pay special attention to the area(s) where your incision(s) will be located unless they are on your face.  Avoid scrubbing your skin too hard.  The important point is to have the CHG soap sit on your skin for 3 minutes.    When the 3 minutes are up, turn on the water and rinse the CHG solution off your body completely.   DO NOT wash with regular soap after you have used the CHG soap solution  Pat yourself dry with a clean, freshly-laundered towel.  DO NOT apply powders, deodorants, or lotions.  Dress in clean, freshly laundered nightclothes.    Be sure to sleep with clean, freshly laundered sheets.  Be aware that CHG will cause stains on fabrics; if you wash them with bleach after use.  Rinse your washcloth and other linens that have contact with CHG completely.  Use only non-chlorine detergents to launder the items used.   The morning of surgery is the fifth day.  Repeat the above steps and dress in clean comfortable clothing     Whom should I contact if I have any questions regarding the use of CHG soap?  Call the [facility] at [phone] if you have any questions.      Preoperative Clearances  -If you are informed by the preadmission testing team that you need clearance for your surgery, please reach out to the provider in question to assisting accommodating obtaining your clearance.   -Please have you provider fax your clearance letter to [phone] if needed.   -A blank clearance letter will be provided to you if  indicated.     Anticoagulation  -If you are on oral anticoagulation such as Coumadin, Eliquis, Xarelto, Plavix, Brilinta, Pradaxa; we will need to obtain preoperative anticoagulation instructions from the prescribing provider.   -We will reach out to the prescriber but do encourage you to call their office as well as it will increase the chances of getting the necessary information.   -We will contact you with the instruction once we obtain them.

## 2025-06-18 NOTE — CPM/PAT H&P
CPM/PAT Evaluation       Name: Sharifa Moore (Sharifa Moore)  /Age: 1945/80 y.o.     In-Person       Chief Complaint: Unilateral primary osteoarthritis, left hip     HPI  Patient is an alert and oriented 80 year old female scheduled for a left total hip arthroplasty on 2025 with Dr. Jaramillo for unilateral primary osteoarthritis, left hip. She endorses left hip pain that she currently rates at a 6/10 which worsens during ambulation and activity. She is ambulatory without assistive devices with a current METS score of 5.7. PMHX includes OA and HLD. Presents to [facility] today for perioperative risk stratification and optimization.     Medical History[1]    Surgical History[2]    Patient  reports that she is not currently sexually active and has had partner(s) who are male. She reports using the following method of birth control/protection: None.    Family History[3]    Allergies[4]    Prior to Admission medications    Medication Sig Start Date End Date Taking? Authorizing Provider   acetaminophen (Tylenol Arthritis Pain) 650 mg ER tablet Take by mouth every 8 hours if needed.   Yes Historical Provider, MD   ascorbic acid (Vitamin C) 500 mg tablet Take 1 tablet (500 mg) by mouth once daily.   Yes Historical Provider, MD   atorvastatin (Lipitor) 10 mg tablet Take 1 tablet (10 mg) by mouth once daily.  Patient taking differently: Take 1 tablet (10 mg) by mouth once daily at bedtime. 24 Yes Nicole Quiles MD   cholecalciferol (Vitamin D3) 25 mcg (1,000 units) tablet Take 1 tablet (25 mcg) by mouth once daily.   Yes Historical Provider, MD   cyclobenzaprine (Flexeril) 10 mg tablet Take 1 tablet (10 mg) by mouth as needed at bedtime for muscle spasms. 2/28/25 10/26/25 Yes Nicole Quiles MD   fexofenadine (Allegra) 180 mg tablet Take 1 tablet (180 mg) by mouth once daily as needed (allergies).   Yes Historical Provider, MD   ibuprofen 800 mg tablet Take 1 tablet (800 mg) by mouth every 8 hours if  needed for moderate pain (4 - 6). 8/30/24  Yes Nicole Quiles MD   meloxicam (Mobic) 15 mg tablet Take 1 tablet (15 mg) by mouth once daily as needed for moderate pain (4 - 6). 3/26/25 3/26/26 Yes Nicole Quiles MD   clindamycin (Cleocin) 300 mg capsule TAKE 2 CAPSULES BY MOUTH ONE HOUR PRIOR TO APPT 4/26/24   Historical Provider, MD   meclizine (Antivert) 25 mg tablet TAKE 1 TABLET (ORAL) EVERY 6 HOURS FOR 30 DAYS TAKE AS NEEDED FOR DIZZINESS/VERTIGO 12/18/24 6/18/25  Historical Provider, MD      Review of Systems   Constitutional: Negative for chills, decreased appetite, diaphoresis, fever and malaise/fatigue.   Eyes:  Negative for blurred vision and double vision.   Cardiovascular:  Negative for chest pain, claudication, cyanosis, dyspnea on exertion, irregular heartbeat, leg swelling, near-syncope and palpitations.   Respiratory:  Negative for cough, hemoptysis, shortness of breath and wheezing.    Endocrine: Negative for cold intolerance, heat intolerance, polydipsia, polyphagia and polyuria.   Gastrointestinal:  Negative for abdominal pain, constipation, diarrhea, dysphagia, nausea and vomiting.   Genitourinary:  Negative for bladder incontinence, dysuria, hematuria, incomplete emptying, nocturia, frequency, pelvic pain and urgency.   Neurological:  Negative for headaches, light-headedness, paresthesias, sensory change and weakness.   Psychiatric/Behavioral:  Negative for altered mental status.    Musculoskeletal: Negative for myalgias. Positive for arthralgias     Vitals and nursing note reviewed.     Physical exam  Constitutional:       Appearance: Normal appearance. She is Overweight.   HENT:      Head: Normocephalic and atraumatic.      Mouth/Throat:      Mouth: Mucous membranes are moist.      Pharynx: Oropharynx is clear.   Eyes:      Extraocular Movements: Extraocular movements intact.      Conjunctiva/sclera: Conjunctivae normal.      Pupils: Pupils are equal, round, and reactive to light.   Cardiovascular:     "  PMI at left midclavicular line. Normal rate. Regular rhythm. Normal S1. Normal S2.       Murmurs: There is no murmur.      No gallop.  No click. No rub.       No audible carotid bruit     No lower extremity edema on exam  Pulmonary:      Effort: Pulmonary effort is normal.      Breath sounds: Normal breath sounds.   Abdominal:      General: Abdomen is flat. Bowel sounds are normal.      Palpations: Abdomen is soft and non-tender  Musculoskeletal:      Cervical back: Normal range of motion and neck supple.      Hip, left: Limited ROM  Skin:     General: Skin is warm and dry.      Capillary Refill: Capillary refill takes less than 2 seconds.   Neurological:      General: No focal deficit present.      Mental Status: She is alert and oriented to person, place, and time. Mental status is at baseline.   Psychiatric:         Mood and Affect: Mood normal.         Behavior: Behavior normal.         Thought Content: Thought content normal.         Judgment: Judgment normal.     Vitals and nursing note reviewed. Physical exam within normal limits.     Visit Vitals  /80   Pulse 78   Temp 36.3 °C (97.4 °F)   Resp 16   Ht 1.575 m (5' 2\")   Wt 72.6 kg (160 lb)   SpO2 98%   BMI 29.26 kg/m²   OB Status Postmenopausal   Smoking Status Never   BSA 1.78 m²     DASI Risk Score      Flowsheet Row Pre-Admission Testing from 6/18/2025 in [facility] Questionnaire Series Submission from 5/29/2025 in [facility] OR with Generic Provider Christen   Can you take care of yourself (eat, dress, bathe, or use toilet)?  2.75 filed at 06/18/2025 1552 2.75  filed at 05/29/2025 1508   Can you walk indoors, such as around your house? 1.75 filed at 06/18/2025 1552 1.75  filed at 05/29/2025 1508   Can you walk a block or two on level ground?  2.75 filed at 06/18/2025 1552 0  filed at 05/29/2025 1508   Can you climb a flight of stairs or walk up a hill? 5.5 filed at 06/18/2025 1552 5.5  filed at 05/29/2025 1508 "   Can you run a short distance? 0 filed at 06/18/2025 1552 0  filed at 05/29/2025 1508   Can you do light work around the house like dusting or washing dishes? 2.7 filed at 06/18/2025 1552 2.7  filed at 05/29/2025 1508   Can you do moderate work around the house like vacuuming, sweeping floors or carrying groceries? 3.5 filed at 06/18/2025 1552 3.5  filed at 05/29/2025 1508   Can you do heavy work around the house like scrubbing floors or lifting and moving heavy furniture?  0 filed at 06/18/2025 1552 0  filed at 05/29/2025 1508   Can you do yard work like raking leaves, weeding or pushing a mower? 0 filed at 06/18/2025 1552 0  filed at 05/29/2025 1508   Can you have sexual relations? 5.25 filed at 06/18/2025 1552 0  filed at 05/29/2025 1508   Can you participate in moderate recreational activities like golf, bowling, dancing, doubles tennis or throwing a baseball or football? 0 filed at 06/18/2025 1552 0  filed at 05/29/2025 1508   Can you participate in strenous sports like swimming, singles tennis, football, basketball, or skiing? 0 filed at 06/18/2025 1552 0  filed at 05/29/2025 1508   DASI SCORE 24.2 filed at 06/18/2025 1552 16.2  filed at 05/29/2025 1508   METS Score (Will be calculated only when all the questions are answered) 5.7 filed at 06/18/2025 1552 4.7  filed at 05/29/2025 1508          Caprini DVT Assessment      Flowsheet Row Pre-Admission Testing from 6/18/2025 in [facility] ED to Hosp-Admission (Discharged) from 11/23/2024 in [facility] Bldg [facility] 1 with hCarles Mendoza MD and Champ Felix MD   DVT Score (IF A SCORE IS NOT CALCULATING, MUST SELECT A BMI TO COMPLETE) 9 filed at 06/18/2025 1551 4 filed at 11/23/2024 1530   Surgical Factors Elective major lower extremity arthroplasty filed at 06/18/2025 1551 --   BMI (BMI MUST BE CHOSEN) 30 or less filed at 06/18/2025 1551 30 or less filed at 11/23/2024 1530          Modified Frailty Index      Flowsheet Row  Pre-Admission Testing from 6/18/2025 in [facility]   Non-independent functional status (problems with dressing, bathing, personal grooming, or cooking) 0 filed at 06/18/2025 1552   History of diabetes mellitus  0 filed at 06/18/2025 1552   History of COPD 0 filed at 06/18/2025 1552   History of CHF No filed at 06/18/2025 1552   History of MI 0 filed at 06/18/2025 1552   History of Percutaneous Coronary Intervention, Cardiac Surgery, or Angina No filed at 06/18/2025 1552   Hypertension requiring the use of medication  0 filed at 06/18/2025 1552   Peripheral vascular disease 0 filed at 06/18/2025 1552   Impaired sensorium (cognitive impairement or loss, clouding, or delirium) 0 filed at 06/18/2025 1552   TIA or CVA withouy residual deficit 0 filed at 06/18/2025 1552   Cerebrovascular accident with deficit 0 filed at 06/18/2025 1552   Modified Frailty Index Calculator 0 filed at 06/18/2025 1552          [id number] Stroke Risk Points  Current as of just now        N/A 0 to 9 Points:      Last Change: N/A          The [id number] risk score (Lip ANDREY, et al. 2009. © 2010 [employer]) quantifies the risk of stroke for a patient with atrial fibrillation. For patients without atrial fibrillation or under the age of 18 this score appears as N/A. Higher score values generally indicate higher risk of stroke.        This score is not applicable to this patient. Components are not calculated.          Revised Cardiac Risk Index      Flowsheet Row Pre-Admission Testing from 6/18/2025 in [facility]   High-Risk Surgery (Intraperitoneal, Intrathoracic,Suprainguinal vascular) 0 filed at 06/18/2025 1552   History of ischemic heart disease (History of MI, History of positive exercuse test, Current chest paint considered due to myocardial ischemia, Use of nitrate therapy, ECG with pathological Q Waves) 0 filed at 06/18/2025 1552   History of congestive heart failure (pulmonary  edemia, bilateral rales or S3 gallop, Paroxysmal nocturnal dyspnea, CXR showing pulmonary vascular redistribution) 0 filed at 06/18/2025 1552   History of cerebrovascular disease (Prior TIA or stroke) 0 filed at 06/18/2025 1552   Pre-operative insulin treatment 0 filed at 06/18/2025 1552   Pre-operative creatinine>2 mg/dl 0 filed at 06/18/2025 1552   Revised Cardiac Risk Calculator 0 filed at 06/18/2025 1552          Apfel Simplified Score    No data to display       Risk Analysis Index Results This Encounter    No data found in the last 10 encounters.       Stop Bang Score      Flowsheet Row Pre-Admission Testing from 6/18/2025 in [facility] Questionnaire Series Submission from 5/29/2025 in [facility] OR with Generic Provider Christen   Do you snore loudly? 0 filed at 06/18/2025 1505 0  filed at 05/29/2025 1508   Do you often feel tired or fatigued after your sleep? 1 filed at 06/18/2025 1505 0  filed at 05/29/2025 1508   Has anyone ever observed you stop breathing in your sleep? 0 filed at 06/18/2025 1505 0  filed at 05/29/2025 1508   Do you have or are you being treated for high blood pressure? 0 filed at 06/18/2025 1505 0  filed at 05/29/2025 1508   Recent BMI (Calculated) 29.3 filed at 06/18/2025 1505 29.1  filed at 05/29/2025 1508   Is BMI greater than 35 kg/m2? 0=No filed at 06/18/2025 1505 0=No  filed at 05/29/2025 1508   Age older than 50 years old? 1=Yes filed at 06/18/2025 1505 1=Yes  filed at 05/29/2025 1508   Is your neck circumference greater than 17 inches (Male) or 16 inches (Female)? 0 filed at 06/18/2025 1505 --   Gender - Male 0=No filed at 06/18/2025 1505 0=No  filed at 05/29/2025 1508   STOP-BANG Total Score 2 filed at 06/18/2025 1505 --          Prodigy: High Risk  Total Score: 12              Prodigy Age Score           ARISCAT Score for Postoperative Pulmonary Complications      Flowsheet Row Pre-Admission Testing from 6/18/2025 in [facility]    Age Calculated Score 3 filed at 06/18/2025 1552   Preoperative SpO2 0 filed at 06/18/2025 1552   Respiratory infection in the last month Either upper or lower (i.e., URI, bronchitis, pneumonia), with fever and antibiotic treatment 0 filed at 06/18/2025 1552   Preoperative anemia (Hgb less than 10 g/dl) 0 filed at 06/18/2025 1552   Surgical incision  0 filed at 06/18/2025 1552   Duration of surgery  16 filed at 06/18/2025 1552   Emergency Procedure  0 filed at 06/18/2025 1552   ARISCAT Total Score  19 filed at 06/18/2025 1552          Elayne Perioperative Risk for Myocardial Infarction or Cardiac Arrest (LORELEI)      Flowsheet Row Pre-Admission Testing from 6/18/2025 in [facility]   Calculated Age Score 1.58 filed at 06/18/2025 1552   Functional Status  0 filed at 06/18/2025 1552   ASA Class  -3.29 filed at 06/18/2025 1552   Creatinine 0 filed at 06/18/2025 1552   Type of Procedure  0.80 filed at 06/18/2025 1552   LORELEI Total Score  -6.16 filed at 06/18/2025 1552   LORELEI % 0.21 filed at 06/18/2025 1552          Assessment & Plan:    Neuro:  No diagnosis or significant findings on chart review or clinical presentation and evaluation.     HEENT/Airway:  No diagnosis or significant findings on chart review or clinical presentation and evaluation.   STOP-BANG Score-2 points moderate risk for MANN    Mallampati::  III    TM distance::  >3 FB    Neck ROM::  Full  Dentures-denies  Crowns-reports x 1  Implants-denies    Cardiovascular:  No significant findings on chart review or clinical presentation and evaluation.   History of Hyperlipidemia-Managed with Atorvastatin.   METS: 5.7  RCRI: 0 points, 3.9%  risk for postoperative MACE   LORELEI: 0.21% risk for postoperative MACE  EKG -Completed 6/18/2025  Normal sinus rhythm  Normal EKG    Pulmonary:  No diagnosis or significant findings on chart review or clinical presentation and evaluation.   ARISCAT: <26 points, 1.6% risk of in-hospital postoperative pulmonary  complication  PRODIGY: Moderate risk for opioid induced respiratory depression  Smoking History-She has never smoked.  Discussed smoking cessation and deep breathing handout given    Renal/Urinary:  No diagnosis or significant findings on chart review or clinical presentation and evaluation, however, the patient is at increased risk of perioperative renal complications secondary to age>/= 56. Preventative measures include BP monitoring, medication compliance, and hydration management.   CMP-Reviewed, stable  Creatinine-0.61  GFR->90    Endocrine:  No diagnosis or significant findings on chart review or clinical presentation and evaluation.   YRV9X-5.7%    Hematologic/Immunology:  No diagnosis or significant findings on chart review or clinical presentation and evaluation.  The patient is not a Jehovah’s witness and will accept blood and blood products if medically indicated.   History of previous blood transfusions No  CBC-Reviewed, stable  HGB-13.5  Caprini Score 9, patient at High for postoperative DVT. Pt supplied education/VTE handout  Anticoagulation use: No     Gastrointestinal:   No diagnosis or significant findings on chart review or clinical presentation and evaluation.   Recreational drug use: none  Alcohol use none    Infectious disease:   No diagnosis or significant findings on chart review or clinical presentation and evaluation.   Prescription provided for CHG body wash and dental rinse. CHG use instructions reviewed and provided to patient.  Staph screen collected-Negative    Musculoskeletal:   No diagnosis or significant findings on chart review or clinical presentation and evaluation.   JHFRAT score-2 points. low risk for falls    Anesthesia:  ASA 2 - Patient with mild systemic disease with no functional limitations  Anticipated anesthesia-Consult  History of General anesthesia- yes  Complications- No anesthesia complications  No family history of anesthesia complications    Labs & Imaging  ordered:  CBC, CMP, HBA1C, MRSA, EKG  Nickel/metal allergy-negative  Shellfish allergy-negative    Overall, patient Low Risk for the scheduled Moderate Risk surgery. Discussed with patient medication instructions, NPO guidelines, and any questions or concerns.     Face to face time spent 45 minutes  All resulted testing from PAT was forwarded to the surgeon and the patient's PCP if applicable.        [1]   Past Medical History:  Diagnosis Date    Acute diverticulitis     Allergic     Anemia 2024    Arthritis     Chronic vaginitis 2023    GI bleed 2018    History of arthroplasty of right shoulder 2024    Pain in left hip 2022    Left hip pain    Personal history of urinary (tract) infections 2014    Personal history of urinary tract infection    Scoliosis     Subacute and chronic vaginitis 2015    Chronic vaginitis    Tear of medial meniscus of knee 2023    Vertigo, benign positional 2023   [2]   Past Surgical History:  Procedure Laterality Date     SECTION, CLASSIC  2014     Section     SECTION, LOW TRANSVERSE      COLONOSCOPY  2014    Complete Colonoscopy    FOOT SURGERY Left     JOINT REPLACEMENT      TOTAL SHOULDER ARTHROPLASTY Right     WISDOM TOOTH EXTRACTION     [3]   Family History  Problem Relation Name Age of Onset    Osteoporosis Mother Anne-Marie Jarvis     Stroke Mother Anne-Marie Jarvis     Colon cancer Father Haris Conley     Hypertension Father Haris Conley     Migraines Father Haris Conley     Prostate cancer Father Haris Conley     Arthritis Father Haris Conley     Kidney disease Father Haris Conley     Asthma Daughter Elmira Moore     Diabetes Daughter Elmira Moore     Breast cancer Maternal Grandmother Alena Thomas     Cancer Maternal Grandmother Alena Thomas     Hypertension Other      Cancer Other     [4]   Allergies  Allergen Reactions    Ampicillin Unknown    Penicillins Hives     Sulfamethoxazole Hives

## 2025-06-18 NOTE — H&P (VIEW-ONLY)
CPM/PAT Evaluation       Name: Sharifa Moore (Sharifa Moore)  /Age: 1945/80 y.o.     In-Person       Chief Complaint: Unilateral primary osteoarthritis, left hip     HPI  Patient is an alert and oriented 80 year old female scheduled for a left total hip arthroplasty on 2025 with Dr. Jaramillo for unilateral primary osteoarthritis, left hip. She endorses left hip pain that she currently rates at a 6/10 which worsens during ambulation and activity. She is ambulatory without assistive devices with a current METS score of 5.7. PMHX includes OA and HLD. Presents to [facility] today for perioperative risk stratification and optimization.     Medical History[1]    Surgical History[2]    Patient  reports that she is not currently sexually active and has had partner(s) who are male. She reports using the following method of birth control/protection: None.    Family History[3]    Allergies[4]    Prior to Admission medications    Medication Sig Start Date End Date Taking? Authorizing Provider   acetaminophen (Tylenol Arthritis Pain) 650 mg ER tablet Take by mouth every 8 hours if needed.   Yes Historical Provider, MD   ascorbic acid (Vitamin C) 500 mg tablet Take 1 tablet (500 mg) by mouth once daily.   Yes Historical Provider, MD   atorvastatin (Lipitor) 10 mg tablet Take 1 tablet (10 mg) by mouth once daily.  Patient taking differently: Take 1 tablet (10 mg) by mouth once daily at bedtime. 24 Yes Nicole Quiles MD   cholecalciferol (Vitamin D3) 25 mcg (1,000 units) tablet Take 1 tablet (25 mcg) by mouth once daily.   Yes Historical Provider, MD   cyclobenzaprine (Flexeril) 10 mg tablet Take 1 tablet (10 mg) by mouth as needed at bedtime for muscle spasms. 2/28/25 10/26/25 Yes Nicole Quiles MD   fexofenadine (Allegra) 180 mg tablet Take 1 tablet (180 mg) by mouth once daily as needed (allergies).   Yes Historical Provider, MD   ibuprofen 800 mg tablet Take 1 tablet (800 mg) by mouth every 8 hours if  needed for moderate pain (4 - 6). 8/30/24  Yes Nicole Quiles MD   meloxicam (Mobic) 15 mg tablet Take 1 tablet (15 mg) by mouth once daily as needed for moderate pain (4 - 6). 3/26/25 3/26/26 Yes Nicole Quiles MD   clindamycin (Cleocin) 300 mg capsule TAKE 2 CAPSULES BY MOUTH ONE HOUR PRIOR TO APPT 4/26/24   Historical Provider, MD   meclizine (Antivert) 25 mg tablet TAKE 1 TABLET (ORAL) EVERY 6 HOURS FOR 30 DAYS TAKE AS NEEDED FOR DIZZINESS/VERTIGO 12/18/24 6/18/25  Historical Provider, MD      Review of Systems   Constitutional: Negative for chills, decreased appetite, diaphoresis, fever and malaise/fatigue.   Eyes:  Negative for blurred vision and double vision.   Cardiovascular:  Negative for chest pain, claudication, cyanosis, dyspnea on exertion, irregular heartbeat, leg swelling, near-syncope and palpitations.   Respiratory:  Negative for cough, hemoptysis, shortness of breath and wheezing.    Endocrine: Negative for cold intolerance, heat intolerance, polydipsia, polyphagia and polyuria.   Gastrointestinal:  Negative for abdominal pain, constipation, diarrhea, dysphagia, nausea and vomiting.   Genitourinary:  Negative for bladder incontinence, dysuria, hematuria, incomplete emptying, nocturia, frequency, pelvic pain and urgency.   Neurological:  Negative for headaches, light-headedness, paresthesias, sensory change and weakness.   Psychiatric/Behavioral:  Negative for altered mental status.    Musculoskeletal: Negative for myalgias. Positive for arthralgias     Vitals and nursing note reviewed.     Physical exam  Constitutional:       Appearance: Normal appearance. She is Overweight.   HENT:      Head: Normocephalic and atraumatic.      Mouth/Throat:      Mouth: Mucous membranes are moist.      Pharynx: Oropharynx is clear.   Eyes:      Extraocular Movements: Extraocular movements intact.      Conjunctiva/sclera: Conjunctivae normal.      Pupils: Pupils are equal, round, and reactive to light.   Cardiovascular:     "  PMI at left midclavicular line. Normal rate. Regular rhythm. Normal S1. Normal S2.       Murmurs: There is no murmur.      No gallop.  No click. No rub.       No audible carotid bruit     No lower extremity edema on exam  Pulmonary:      Effort: Pulmonary effort is normal.      Breath sounds: Normal breath sounds.   Abdominal:      General: Abdomen is flat. Bowel sounds are normal.      Palpations: Abdomen is soft and non-tender  Musculoskeletal:      Cervical back: Normal range of motion and neck supple.      Hip, left: Limited ROM  Skin:     General: Skin is warm and dry.      Capillary Refill: Capillary refill takes less than 2 seconds.   Neurological:      General: No focal deficit present.      Mental Status: She is alert and oriented to person, place, and time. Mental status is at baseline.   Psychiatric:         Mood and Affect: Mood normal.         Behavior: Behavior normal.         Thought Content: Thought content normal.         Judgment: Judgment normal.     Vitals and nursing note reviewed. Physical exam within normal limits.     Visit Vitals  /80   Pulse 78   Temp 36.3 °C (97.4 °F)   Resp 16   Ht 1.575 m (5' 2\")   Wt 72.6 kg (160 lb)   SpO2 98%   BMI 29.26 kg/m²   OB Status Postmenopausal   Smoking Status Never   BSA 1.78 m²     DASI Risk Score      Flowsheet Row Pre-Admission Testing from 6/18/2025 in [facility] Questionnaire Series Submission from 5/29/2025 in [facility] OR with Generic Provider Christen   Can you take care of yourself (eat, dress, bathe, or use toilet)?  2.75 filed at 06/18/2025 1552 2.75  filed at 05/29/2025 1508   Can you walk indoors, such as around your house? 1.75 filed at 06/18/2025 1552 1.75  filed at 05/29/2025 1508   Can you walk a block or two on level ground?  2.75 filed at 06/18/2025 1552 0  filed at 05/29/2025 1508   Can you climb a flight of stairs or walk up a hill? 5.5 filed at 06/18/2025 1552 5.5  filed at 05/29/2025 1508 "   Can you run a short distance? 0 filed at 06/18/2025 1552 0  filed at 05/29/2025 1508   Can you do light work around the house like dusting or washing dishes? 2.7 filed at 06/18/2025 1552 2.7  filed at 05/29/2025 1508   Can you do moderate work around the house like vacuuming, sweeping floors or carrying groceries? 3.5 filed at 06/18/2025 1552 3.5  filed at 05/29/2025 1508   Can you do heavy work around the house like scrubbing floors or lifting and moving heavy furniture?  0 filed at 06/18/2025 1552 0  filed at 05/29/2025 1508   Can you do yard work like raking leaves, weeding or pushing a mower? 0 filed at 06/18/2025 1552 0  filed at 05/29/2025 1508   Can you have sexual relations? 5.25 filed at 06/18/2025 1552 0  filed at 05/29/2025 1508   Can you participate in moderate recreational activities like golf, bowling, dancing, doubles tennis or throwing a baseball or football? 0 filed at 06/18/2025 1552 0  filed at 05/29/2025 1508   Can you participate in strenous sports like swimming, singles tennis, football, basketball, or skiing? 0 filed at 06/18/2025 1552 0  filed at 05/29/2025 1508   DASI SCORE 24.2 filed at 06/18/2025 1552 16.2  filed at 05/29/2025 1508   METS Score (Will be calculated only when all the questions are answered) 5.7 filed at 06/18/2025 1552 4.7  filed at 05/29/2025 1508          Caprini DVT Assessment      Flowsheet Row Pre-Admission Testing from 6/18/2025 in [facility] ED to Hosp-Admission (Discharged) from 11/23/2024 in [facility] Bldg A 1 with Charles Mendoza MD and Champ Felix MD   DVT Score (IF A SCORE IS NOT CALCULATING, MUST SELECT A BMI TO COMPLETE) 9 filed at 06/18/2025 1551 4 filed at 11/23/2024 1530   Surgical Factors Elective major lower extremity arthroplasty filed at 06/18/2025 1551 --   BMI (BMI MUST BE CHOSEN) 30 or less filed at 06/18/2025 1551 30 or less filed at 11/23/2024 1530          Modified Frailty Index      Flowsheet Row  Pre-Admission Testing from 6/18/2025 in [facility]   Non-independent functional status (problems with dressing, bathing, personal grooming, or cooking) 0 filed at 06/18/2025 1552   History of diabetes mellitus  0 filed at 06/18/2025 1552   History of COPD 0 filed at 06/18/2025 1552   History of CHF No filed at 06/18/2025 1552   History of MI 0 filed at 06/18/2025 1552   History of Percutaneous Coronary Intervention, Cardiac Surgery, or Angina No filed at 06/18/2025 1552   Hypertension requiring the use of medication  0 filed at 06/18/2025 1552   Peripheral vascular disease 0 filed at 06/18/2025 1552   Impaired sensorium (cognitive impairement or loss, clouding, or delirium) 0 filed at 06/18/2025 1552   TIA or CVA withouy residual deficit 0 filed at 06/18/2025 1552   Cerebrovascular accident with deficit 0 filed at 06/18/2025 1552   Modified Frailty Index Calculator 0 filed at 06/18/2025 1552          [id number] Stroke Risk Points  Current as of just now        N/A 0 to 9 Points:      Last Change: N/A          The [id number] risk score (Lip ANDREY, et al. 2009. © 2010 [employer]) quantifies the risk of stroke for a patient with atrial fibrillation. For patients without atrial fibrillation or under the age of 18 this score appears as N/A. Higher score values generally indicate higher risk of stroke.        This score is not applicable to this patient. Components are not calculated.          Revised Cardiac Risk Index      Flowsheet Row Pre-Admission Testing from 6/18/2025 in [facility]   High-Risk Surgery (Intraperitoneal, Intrathoracic,Suprainguinal vascular) 0 filed at 06/18/2025 1552   History of ischemic heart disease (History of MI, History of positive exercuse test, Current chest paint considered due to myocardial ischemia, Use of nitrate therapy, ECG with pathological Q Waves) 0 filed at 06/18/2025 1552   History of congestive heart failure (pulmonary  edemia, bilateral rales or S3 gallop, Paroxysmal nocturnal dyspnea, CXR showing pulmonary vascular redistribution) 0 filed at 06/18/2025 1552   History of cerebrovascular disease (Prior TIA or stroke) 0 filed at 06/18/2025 1552   Pre-operative insulin treatment 0 filed at 06/18/2025 1552   Pre-operative creatinine>2 mg/dl 0 filed at 06/18/2025 1552   Revised Cardiac Risk Calculator 0 filed at 06/18/2025 1552          Apfel Simplified Score    No data to display       Risk Analysis Index Results This Encounter    No data found in the last 10 encounters.       Stop Bang Score      Flowsheet Row Pre-Admission Testing from 6/18/2025 in [facility] Questionnaire Series Submission from 5/29/2025 in [facility] OR with Generic Provider Christen   Do you snore loudly? 0 filed at 06/18/2025 1505 0  filed at 05/29/2025 1508   Do you often feel tired or fatigued after your sleep? 1 filed at 06/18/2025 1505 0  filed at 05/29/2025 1508   Has anyone ever observed you stop breathing in your sleep? 0 filed at 06/18/2025 1505 0  filed at 05/29/2025 1508   Do you have or are you being treated for high blood pressure? 0 filed at 06/18/2025 1505 0  filed at 05/29/2025 1508   Recent BMI (Calculated) 29.3 filed at 06/18/2025 1505 29.1  filed at 05/29/2025 1508   Is BMI greater than 35 kg/m2? 0=No filed at 06/18/2025 1505 0=No  filed at 05/29/2025 1508   Age older than 50 years old? 1=Yes filed at 06/18/2025 1505 1=Yes  filed at 05/29/2025 1508   Is your neck circumference greater than 17 inches (Male) or 16 inches (Female)? 0 filed at 06/18/2025 1505 --   Gender - Male 0=No filed at 06/18/2025 1505 0=No  filed at 05/29/2025 1508   STOP-BANG Total Score 2 filed at 06/18/2025 1505 --          Prodigy: High Risk  Total Score: 12              Prodigy Age Score           ARISCAT Score for Postoperative Pulmonary Complications      Flowsheet Row Pre-Admission Testing from 6/18/2025 in [facility]    Age Calculated Score 3 filed at 06/18/2025 1552   Preoperative SpO2 0 filed at 06/18/2025 1552   Respiratory infection in the last month Either upper or lower (i.e., URI, bronchitis, pneumonia), with fever and antibiotic treatment 0 filed at 06/18/2025 1552   Preoperative anemia (Hgb less than 10 g/dl) 0 filed at 06/18/2025 1552   Surgical incision  0 filed at 06/18/2025 1552   Duration of surgery  16 filed at 06/18/2025 1552   Emergency Procedure  0 filed at 06/18/2025 1552   ARISCAT Total Score  19 filed at 06/18/2025 1552          Elayne Perioperative Risk for Myocardial Infarction or Cardiac Arrest (LORELEI)      Flowsheet Row Pre-Admission Testing from 6/18/2025 in [facility]   Calculated Age Score 1.58 filed at 06/18/2025 1552   Functional Status  0 filed at 06/18/2025 1552   ASA Class  -3.29 filed at 06/18/2025 1552   Creatinine 0 filed at 06/18/2025 1552   Type of Procedure  0.80 filed at 06/18/2025 1552   LORELEI Total Score  -6.16 filed at 06/18/2025 1552   LORELEI % 0.21 filed at 06/18/2025 1552          Assessment & Plan:    Neuro:  No diagnosis or significant findings on chart review or clinical presentation and evaluation.     HEENT/Airway:  No diagnosis or significant findings on chart review or clinical presentation and evaluation.   STOP-BANG Score-2 points moderate risk for MANN    Mallampati::  III    TM distance::  >3 FB    Neck ROM::  Full  Dentures-denies  Crowns-reports x 1  Implants-denies    Cardiovascular:  No significant findings on chart review or clinical presentation and evaluation.   History of Hyperlipidemia-Managed with Atorvastatin.   METS: 5.7  RCRI: 0 points, 3.9%  risk for postoperative MACE   LORELEI: 0.21% risk for postoperative MACE  EKG -Completed 6/18/2025  Normal sinus rhythm  Normal EKG    Pulmonary:  No diagnosis or significant findings on chart review or clinical presentation and evaluation.   ARISCAT: <26 points, 1.6% risk of in-hospital postoperative pulmonary  complication  PRODIGY: Moderate risk for opioid induced respiratory depression  Smoking History-She has never smoked.  Discussed smoking cessation and deep breathing handout given    Renal/Urinary:  No diagnosis or significant findings on chart review or clinical presentation and evaluation, however, the patient is at increased risk of perioperative renal complications secondary to age>/= 56. Preventative measures include BP monitoring, medication compliance, and hydration management.   CMP-Reviewed, stable  Creatinine-0.61  GFR->90    Endocrine:  No diagnosis or significant findings on chart review or clinical presentation and evaluation.   AHB1P-0.7%    Hematologic/Immunology:  No diagnosis or significant findings on chart review or clinical presentation and evaluation.  The patient is not a Jehovah’s witness and will accept blood and blood products if medically indicated.   History of previous blood transfusions No  CBC-Reviewed, stable  HGB-13.5  Caprini Score 9, patient at High for postoperative DVT. Pt supplied education/VTE handout  Anticoagulation use: No     Gastrointestinal:   No diagnosis or significant findings on chart review or clinical presentation and evaluation.   Recreational drug use: none  Alcohol use none    Infectious disease:   No diagnosis or significant findings on chart review or clinical presentation and evaluation.   Prescription provided for CHG body wash and dental rinse. CHG use instructions reviewed and provided to patient.  Staph screen collected-Negative    Musculoskeletal:   No diagnosis or significant findings on chart review or clinical presentation and evaluation.   JHFRAT score-2 points. low risk for falls    Anesthesia:  ASA 2 - Patient with mild systemic disease with no functional limitations  Anticipated anesthesia-Consult  History of General anesthesia- yes  Complications- No anesthesia complications  No family history of anesthesia complications    Labs & Imaging  ordered:  CBC, CMP, HBA1C, MRSA, EKG  Nickel/metal allergy-negative  Shellfish allergy-negative    Overall, patient Low Risk for the scheduled Moderate Risk surgery. Discussed with patient medication instructions, NPO guidelines, and any questions or concerns.     Face to face time spent 45 minutes  All resulted testing from PAT was forwarded to the surgeon and the patient's PCP if applicable.        [1]   Past Medical History:  Diagnosis Date    Acute diverticulitis     Allergic     Anemia 2024    Arthritis     Chronic vaginitis 2023    GI bleed 2018    History of arthroplasty of right shoulder 2024    Pain in left hip 2022    Left hip pain    Personal history of urinary (tract) infections 2014    Personal history of urinary tract infection    Scoliosis     Subacute and chronic vaginitis 2015    Chronic vaginitis    Tear of medial meniscus of knee 2023    Vertigo, benign positional 2023   [2]   Past Surgical History:  Procedure Laterality Date     SECTION, CLASSIC  2014     Section     SECTION, LOW TRANSVERSE      COLONOSCOPY  2014    Complete Colonoscopy    FOOT SURGERY Left     JOINT REPLACEMENT      TOTAL SHOULDER ARTHROPLASTY Right     WISDOM TOOTH EXTRACTION     [3]   Family History  Problem Relation Name Age of Onset    Osteoporosis Mother Anne-Marie Jarvis     Stroke Mother Anne-Marie Jarvis     Colon cancer Father Haris Conley     Hypertension Father Haris Conley     Migraines Father Haris Conley     Prostate cancer Father Haris Conley     Arthritis Father Haris Conley     Kidney disease Father Haris Conley     Asthma Daughter Elmira Moore     Diabetes Daughter Elmira Moore     Breast cancer Maternal Grandmother Alena Thomas     Cancer Maternal Grandmother Alena Thomas     Hypertension Other      Cancer Other     [4]   Allergies  Allergen Reactions    Ampicillin Unknown    Penicillins Hives     Sulfamethoxazole Hives

## 2025-06-19 LAB
ATRIAL RATE: 78 BPM
P AXIS: 58 DEGREES
P OFFSET: 175 MS
P ONSET: 121 MS
PR INTERVAL: 190 MS
Q ONSET: 216 MS
QRS COUNT: 13 BEATS
QRS DURATION: 78 MS
QT INTERVAL: 410 MS
QTC CALCULATION(BAZETT): 467 MS
QTC FREDERICIA: 447 MS
R AXIS: -5 DEGREES
T AXIS: 48 DEGREES
T OFFSET: 421 MS
VENTRICULAR RATE: 78 BPM

## 2025-06-20 LAB — STAPHYLOCOCCUS SPEC CULT: NORMAL

## 2025-06-23 ENCOUNTER — APPOINTMENT (OUTPATIENT)
Dept: ORTHOPEDIC SURGERY | Facility: HOSPITAL | Age: 80
End: 2025-06-23
Payer: MEDICARE

## 2025-06-23 ENCOUNTER — APPOINTMENT (OUTPATIENT)
Dept: PREADMISSION TESTING | Facility: HOSPITAL | Age: 80
End: 2025-06-23
Payer: MEDICARE

## 2025-06-25 ENCOUNTER — APPOINTMENT (OUTPATIENT)
Dept: ORTHOPEDIC SURGERY | Facility: HOSPITAL | Age: 80
End: 2025-06-25
Payer: MEDICARE

## 2025-06-25 ENCOUNTER — EDUCATION (OUTPATIENT)
Dept: ORTHOPEDIC SURGERY | Facility: HOSPITAL | Age: 80
End: 2025-06-25
Payer: MEDICARE

## 2025-06-25 ENCOUNTER — APPOINTMENT (OUTPATIENT)
Facility: CLINIC | Age: 80
End: 2025-06-25
Payer: MEDICARE

## 2025-06-25 ENCOUNTER — APPOINTMENT (OUTPATIENT)
Dept: PREADMISSION TESTING | Facility: HOSPITAL | Age: 80
End: 2025-06-25
Payer: MEDICARE

## 2025-06-25 PROCEDURE — E0143 WALKER FOLDING WHEELED W/O S: HCPCS | Performed by: ORTHOPAEDIC SURGERY

## 2025-06-25 PROCEDURE — E0218 FLUID CIRC COLD PAD W PUMP: HCPCS | Performed by: ORTHOPAEDIC SURGERY

## 2025-06-25 NOTE — GROUP NOTE
In addition to the group class activities, Sharifa Moore had the following lab work completed:  No orders of the defined types were placed in this encounter.      A new History and Physical was not completed.    This class lasted approximately 2 hour and had 6 participants. The nurse instructor covered the following topics:  MyChart Enrollment  Communication with Care Team  My Chart is the best form of communication to reach all of your caregivers  You can send messages to specific care givers, or a care team  Continued Education  You will be enrolled in a Joint Replacement care plan to receive additional education before and after surgery  You can review a short recording of the class content - [email or web address]  Access to Medical Records  You can access test results, office notes, appointments, etc.  You can connect to other healthcare systems who use [employer] ([facility], [facility], [facility], etc.)  [employer]  Program Information  Opportunity to Opt Out    Background/Understanding of Joint Replacement Surgery  Potential for same day discharge  Any questions or concerns to be directed to the surgeon's office  Not all patients are appropriate for same day discharge  All patients will be required to meet discharge criteria prior to leaving our care    How to Prepare for Surgery  Use of Recreational Products (Nicotine, Alcohol, THC, CBD, Drugs, Etc.)  The ultimate goal is to quit using thee products!  Stop several weeks before surgery  Such products slow down the healing process and increase risk of post-op infection and complications  Clearance for Surgery  Preadmission Testing - Appropriateness for anesthesia  Medical Clearance by Specialists  Dental Clearance  Cracked/Broken/Loose teeth left untreated may postpone surgery  The importance of post-op antibiotics for dental visits per surgeon protocol  Preadmission Testing  **Potential for postponed surgery if appropriate clearance is not  obtained  Medication Instruction  Follow instructions provided by the doctor who prescribes your medication (typically, but not limited to cardiologist)  Preadmission testing will provide additional instructions during your appointment on what to stop and what to take as you get closer to surgery  For clarification of these instructions, please call preadmission testing directly - [phone]  Tips for Preparing the home for discharge from the hospital  Care Partner  Requirement for surgery, the patient must have a plan to have help at home  Potential for postponed surgery if plan for home support cannot be established  Your Care Partner does not need medical training  How the care partner can help after surgery  CHG Body Wash/Mouth Wash  Follow the instructions given at preadmission testing  Body wash is to be used on the body and hair for 5 washes  Mouthwash is to be used the night before and morning of surgery  **This is a system-wide protocol developed by infectious disease professionals, we will not alter our recommendations for those with sensitive skin or those who have special hair needs.  Please follow the instructions as they are written as this will provide the best infection prevention measures for surgery.  Should you have an allergy to one of the products, please discuss with your preadmission team**    What to Expect in the Hospital/At Home  Morning of Surgery NPO Guidelines  Nothing to eat after midnight  Water can be consumed up to 2 hours prior to arrival  Surgical and Post-Surgical Care Team  Surgical Team  Anesthesia Team  Nursing  Physical Therapy  Care Coordinating  Pharmacy  Hospital Arrival Instructions  Arrive at the time provided to you  Consider traffic patterns (rush-hour) based on arrival time  Have arrangements made for a ride home  If discharging same day, care partner should remain at the hospital  Recovering after Surgery  Recovery Room - Visitors are not brought back  Transition to  hospital room - 2nd Floor, Visitors will be directed to your room  The presence of and strategies for controlling surgical pain and swelling  The importance of early mobility  Side effects after surgery  What to expect if staying overnight    Discharge Planning  The intended plan for discharge will be for patients to discharge home  All patients require a care partner (family, friend, neighbor, etc.) to stay with the patient for the first few nights after surgery  The inability to secure help at home may postpone surgery  Home Care Services set up per surgeon order  Physical Therapy  Occupational Therapy  **If desired, private duty care can be arranged by the patient ahead of time**  Outpatient Physical Therapy per surgeon order    Recovering at Home  Wound Care  Follow wound care instructions found in your discharge paperwork  Bandage is water-resistant and you may shower with the bandage  Do not scrub directly over the bandage  Do not submerge in water until cleared (bathtub, hot tub, pool, etc.)    Post-Op Risk Prevention  Infection Prevention  Promptly seek treatment for any infections post-operatively  Routine dental visits must be postponed for 3 months after surgery  Your surgeon may require antibiotics prior to future dental visits  Any concerns for infection not related directly to the knee or the hip should be managed by your primary care provider  Blood Clots  Be sure to complete the course of blood thinning medication as prescribed by your surgeon  Movement every 1-2 hours during the day is encouraged to prevent blood clots  Monitor for signs of blood clots  Wear compression stockings as prescribed by your surgeon  Constipation  Constipation is common following surgery  Drink plenty of fluids  Take stool softener/laxative as prescribed by your surgeon  Move around frequently  Eat foods high in fiber  Fall Prevention  Prepare home ahead of time to clear space to move with walker  Remove throw rugs and  electrical cords from walkways  Install railings near any stairways with more than 2 steps  Use night lights for increased visibility at night  Continue to use your assistive device until cleared by surgeon or physical therapy  Dislocation Prevention - Not all procedures will have dislocation precautions  Follow dislocation precautions provided by your surgeon  It is OK to resume sexual activity about 6 weeks following surgery  Be sure to follow any dislocation precautions assigned    Durable Medical Equipment  Cold Therapy  Breg Cold Therapy Machines  Ice/Gel Packs  Assistive Devices  Folding Walker with Wheels (in the front only)  No Rollators  Crutches if approved by Physical Therapy and Surgeon after surgery  Hip Kits  Raised Toilet Seats  Additional Compression Stockings    Joint Preservation  Healthy Activities when Cleared  Walking  Swimming  Bike Riding  Activities to Avoid  Refrain from repetitive motions which have a high impact on the joint  Gradual Progression  Progress activity slowly, listen to your body  Common Findings - NORMAL after surgery  Clicking/Grinding  Numbness near incision    Physical Therapy  Prehabilitation exercises  START TODAY ON BOTH LEGS  Surgery Specific Precautions  Follow surgery specific precautions found in your discharge paperwork    Follow-Up Visit  All patients will see their surgeon for a follow up visit after surgery  The visit may range from 2-6 weeks after surgery and is surgeon specific      Please don't hesitate to reach out if you have any additional questions or concerns.    Thank you,  NADJA Lindquist, RN  Rosie Vieyra RN  [facility]  [facility]   [phone]

## 2025-06-26 ENCOUNTER — APPOINTMENT (OUTPATIENT)
Facility: CLINIC | Age: 80
End: 2025-06-26
Payer: MEDICARE

## 2025-06-26 ENCOUNTER — APPOINTMENT (OUTPATIENT)
Dept: ORTHOPEDIC SURGERY | Facility: HOSPITAL | Age: 80
End: 2025-06-26
Payer: MEDICARE

## 2025-06-26 PROCEDURE — E0218 FLUID CIRC COLD PAD W PUMP: HCPCS | Performed by: ORTHOPAEDIC SURGERY

## 2025-07-07 ENCOUNTER — TELEPHONE (OUTPATIENT)
Dept: ORTHOPEDIC SURGERY | Facility: HOSPITAL | Age: 80
End: 2025-07-07
Payer: MEDICARE

## 2025-07-07 NOTE — TELEPHONE ENCOUNTER
Thank you for taking my call today.  All questions were answered at the time of the call, but please feel free to reach out to me via MyChart or phone, [phone], with any new questions or concerns.       We confirmed that you opted to enroll in our [id number] program so your discharge prescriptions will be available to take home at the time of discharge.  Please bring any prescription insurance coverage with you on the morning of surgery so that we can enter the information into our system.     We confirmed that your plan would be to Stay Overnight.    We confirmed that you have DME needed for recovery.     Use the provided body wash for 4 days before surgery and complete a 5th shower on the morning of surgery, this includes your body and hair.  Follow the directions as provided during preadmission testing.  The mouth wash will be used the night before and the morning of surgery.       As a reminder, if you do not hear from our team, please call [phone] between 2pm and 3pm the business day before your surgery to confirm your arrival time and details.        Please don't hesitate to reach out with additional questions or concerns.    Thank you,  Janice Domingo, KEKEN, RN  Rosie Vieyra, JOHN  [facility]  [facility]  [phone]

## 2025-07-10 ENCOUNTER — OFFICE VISIT (OUTPATIENT)
Dept: ORTHOPEDIC SURGERY | Facility: HOSPITAL | Age: 80
End: 2025-07-10
Payer: MEDICARE

## 2025-07-10 ENCOUNTER — HOSPITAL ENCOUNTER (OUTPATIENT)
Dept: RADIOLOGY | Facility: EXTERNAL LOCATION | Age: 80
Discharge: HOME | End: 2025-07-10

## 2025-07-10 DIAGNOSIS — M17.11 ARTHRITIS OF RIGHT KNEE: Primary | ICD-10-CM

## 2025-07-10 PROCEDURE — 2500000004 HC RX 250 GENERAL PHARMACY W/ HCPCS (ALT 636 FOR OP/ED): Performed by: EMERGENCY MEDICINE

## 2025-07-10 PROCEDURE — 20611 DRAIN/INJ JOINT/BURSA W/US: CPT | Mod: RT | Performed by: EMERGENCY MEDICINE

## 2025-07-10 PROCEDURE — 99213 OFFICE O/P EST LOW 20 MIN: CPT | Mod: 25 | Performed by: EMERGENCY MEDICINE

## 2025-07-10 PROCEDURE — 99214 OFFICE O/P EST MOD 30 MIN: CPT | Performed by: EMERGENCY MEDICINE

## 2025-07-10 PROCEDURE — L1812 KO ELASTIC W/JOINTS PRE OTS: HCPCS | Performed by: EMERGENCY MEDICINE

## 2025-07-10 RX ORDER — LIDOCAINE HYDROCHLORIDE 10 MG/ML
4 INJECTION, SOLUTION INFILTRATION; PERINEURAL
Status: COMPLETED | OUTPATIENT
Start: 2025-07-10 | End: 2025-07-10

## 2025-07-10 RX ORDER — TRIAMCINOLONE ACETONIDE 40 MG/ML
80 INJECTION, SUSPENSION INTRA-ARTICULAR; INTRAMUSCULAR
Status: COMPLETED | OUTPATIENT
Start: 2025-07-10 | End: 2025-07-10

## 2025-07-10 RX ADMIN — LIDOCAINE HYDROCHLORIDE 4 ML: 10 INJECTION, SOLUTION INFILTRATION; PERINEURAL at 11:21

## 2025-07-10 RX ADMIN — TRIAMCINOLONE ACETONIDE 80 MG: 200 INJECTION, SUSPENSION INTRA-ARTICULAR; INTRAMUSCULAR at 11:21

## 2025-07-10 NOTE — PROGRESS NOTES
Subjective   Patient ID: Sharifa Moore is a 80 y.o. female who presents for Follow-up of the Right Knee (CSI).  History of Present Illness  The patient returns today for right knee pain. She has known right knee DJD and presents today requesting a corticosteroid injection. Multiple injections have been performed for her in the past, with the most recent one on 04/04/2025. She would like to have a repeat injection today. No additional complaints.    All other systems have been reviewed and are negative for complaint.      Objective     There were no vitals taken for this visit.     Physical Exam  - Musculoskeletal:  Right knee: No rash.  No deformity.  No edema.  Full flexion and extension.  Positive varus stress testing for laxity.    Patient ID: Sharifa Moore is a 80 y.o. female.    L Inj/Asp: R knee on 7/10/2025 11:21 AM  Indications: pain  Details: 25 G needle, ultrasound-guided superolateral approach  Medications: 80 mg triamcinolone acetonide 40 mg/mL; 4 mL lidocaine 10 mg/mL (1 %)  Outcome: tolerated well, no immediate complications  Procedure, treatment alternatives, risks and benefits explained, specific risks discussed. Consent was given by the patient. Immediately prior to procedure a time out was called to verify the correct patient, procedure, equipment, support staff and site/side marked as required. Patient was prepped and draped in the usual sterile fashion.           1. Arthritis of right knee  Point of Care Ultrasound    Knee brace          Assessment & Plan  1. Right knee pain due to degenerative joint disease (DJD).  - Examination revealed pseudolaxity, which is not indicative of an LCL injury but rather a result of ligamentous laxity due to cartilage loss.  - A small effusion was noted but not significant enough to warrant drainage.  - A corticosteroid injection was administered today to alleviate the pain and reduce swelling.  - Advised to avoid water exposure for 2 days, including hot  tubs and baths, but showering is permissible. A brace will be fitted for additional support.    PROCEDURE  Right knee corticosteroid injection was performed today.    Patient was prescribed a web reaction lateral  brace for lateral compartment DJD. Physical examination positive for mild laxity with varus stress. The patient is ambulatory with or without aid; but, has weakness, instability and/or deformity of their [right / left] [extremity] which requires stabilization from this orthosis to improve their function.      Verbal and written instructions for the use, wear schedule, cleaning and application of this item were given.  Patient was instructed that should the brace result in increased pain, decreased sensation, increased swelling, or an overall worsening of their medical condition, to please contact our office immediately.     Orthotic management and training was provided for skin care, modifications due to healing tissues, edema changes, interruption in skin integrity, and safety precautions with the orthosis.        Aldo Guzman,          This medical note was created with the assistance of artificial intelligence (AI) for documentation purposes. The content has been reviewed and confirmed by the healthcare provider for accuracy and completeness. Patient consented to the use of audio recording and use of AI during their visit.

## 2025-07-13 PROBLEM — Z96.649: Status: ACTIVE | Noted: 2025-07-13

## 2025-07-13 RX ORDER — TRAMADOL HYDROCHLORIDE 50 MG/1
50 TABLET, FILM COATED ORAL EVERY 6 HOURS PRN
Qty: 28 TABLET | Refills: 0 | Status: SHIPPED | OUTPATIENT
Start: 2025-07-13 | End: 2025-07-21

## 2025-07-13 RX ORDER — ACETAMINOPHEN 500 MG
1000 TABLET ORAL EVERY 6 HOURS PRN
Qty: 240 TABLET | Refills: 0 | Status: SHIPPED | OUTPATIENT
Start: 2025-07-13 | End: 2025-08-12

## 2025-07-13 RX ORDER — ASPIRIN 81 MG/1
81 TABLET ORAL 2 TIMES DAILY
Qty: 60 TABLET | Refills: 0 | Status: SHIPPED | OUTPATIENT
Start: 2025-07-13 | End: 2025-08-13

## 2025-07-13 RX ORDER — OXYCODONE HYDROCHLORIDE 5 MG/1
5 TABLET ORAL EVERY 6 HOURS PRN
Qty: 28 TABLET | Refills: 0 | Status: SHIPPED | OUTPATIENT
Start: 2025-07-13 | End: 2025-07-21

## 2025-07-13 RX ORDER — PANTOPRAZOLE SODIUM 40 MG/1
40 TABLET, DELAYED RELEASE ORAL DAILY
Qty: 30 TABLET | Refills: 0 | Status: SHIPPED | OUTPATIENT
Start: 2025-07-13 | End: 2025-08-13

## 2025-07-13 RX ORDER — POLYETHYLENE GLYCOL 3350 17 G/17G
17 POWDER, FOR SOLUTION ORAL DAILY
Qty: 510 G | Refills: 0 | Status: SHIPPED | OUTPATIENT
Start: 2025-07-13

## 2025-07-13 RX ORDER — MELOXICAM 15 MG/1
15 TABLET ORAL DAILY
Qty: 30 TABLET | Refills: 0 | Status: SHIPPED | OUTPATIENT
Start: 2025-07-13 | End: 2025-08-13

## 2025-07-13 RX ORDER — DOCUSATE SODIUM 100 MG/1
100 CAPSULE, LIQUID FILLED ORAL 2 TIMES DAILY
Qty: 60 CAPSULE | Refills: 0 | Status: SHIPPED | OUTPATIENT
Start: 2025-07-13 | End: 2025-08-13

## 2025-07-13 NOTE — DISCHARGE INSTRUCTIONS
MD Julia William MPAS, PHONG, ATC  Adult Reconstruction and Joint Replacement Surgery  Phone: [phone]     Fax: [phone]        DISCHARGE INSTRUCTIONS      PLEASE READ CAREFULLY BEFORE CONTACTING YOUR PROVIDER.    WE WORK COLLABORATIVELY AS A TEAM. CALLING MULTIPLE STAFF MEMBERS REGARDING THE SAME ISSUE WILL DELAY YOUR CARE.    [employer] IS THE PREFERRED COMMUNICATION FOR ALL TEAM MEMBERS.    POSTOPERATIVE INSTRUCTIONS: TOTAL HIP & TOTAL KNEE ARTHROPLASTY    JOINT CARE TEAM  Please use the information below to contact your care team following surgery.  If you are leaving a message or using the [facility] Chart portal, please include your full name, date of birth and date of surgery so that we can correctly identify you.  Your call will be returned within 1-2 business days, please do not leave multiple messages with other staff regarding a single issue while you are awaiting a return call.     Who to call Contact Information Matters needing handled   Julia Ramon PA-C  Physician Assistant [facility]   Medical questions/concerns       Teodoro Livingston-    [email or web address]           [phone]  opt. 2    [phone] fax  [phone]         Scheduling office Visits  Medical questions/concerns  Leave of Absence or other paperwork  Any concerns more than 6 weeks from surgery - an appointment will need to be made    After Hour and Weekend Emergency Answering Service [phone]     Augusta Gates MBA, BSN, RN-BC, ONC  NADJA Lindquist, RN  Ortho Nurse Navigators [place]        __________________________________    Arnulfo Dudley RN, BSN  Ortho Coordinator [place] DAVIESN-BC  Ortho Nurse Navigator [place]       [phone]     _____________________    [phone] Please call staff at the institution in which you had surgery for prescription refills        Prescription Refills  Nursing, medical question related  questions or concerns within 6 weeks of surgery   Orders for Outpatient Physical Therapy             MEDICATION REFILLS - MyChart or Nurse Navigator (Above) at the institution in which you had surgery. [facility] or [facility].    -You will NOT receive a call indicating that your prescription has been filled.  Please contact your pharmacy with any questions.    Medication refills will be filled Monday-Friday 7am to 1pm ONLY. Please call the nurse navigator office or send a [employer] message for a refill request.  Any requests received outside of this timeframe will be handled on the next business day.  Please do not call multiple times or call other members of the care team for medication needs, this will cause the refill to take longer.    Per State and Institutional policy, pain medications can only be refilled every 7 days for up to six weeks following surgery.    My Chart Portal: If you are using the My Chart portal and are requesting a medication refill, please list what type of surgery you had and left or right side, medication that needs refilled, and pharmacy you would like your medication sent.     WEIGHT BEARING- weight bearing as tolerated to operative extremity     ACTIVITY-As Tolerated    DRIVING & TRAVEL AFTER SURGERY   Patients should anticipate waiting at least 4-6 weeks before traveling long distances after surgery.  You will need to stop to walk around ever 1 hour during your travel to help with blood clot prevention.    Patients may not drive until cleared by the joint nurse or the office and you are off of all narcotics.    DENTAL PROCEDURES & CLEANINGS  You must wait a minimum of 3 months for elective dental appointments after a total joint replacement, including routine cleanings or dental work including bridges, crowns, extractions, etc.. Unless, it is an emergency. You will need a prophylactic antibiotic lifelong prior to any dental visit, cleaning or procedure. Your surgeon's office or your  dentist may provide a prescription antibiotic. Antibiotics are a lifelong need before dental appointments.      You do not need antibiotics for endoscopic procedures such as colonoscopy or EGD, dermatologic biopsies or eye surgeries.    WOUND CARE  If you experience continued drainage or bleeding, you may cover with abdominal/Maxi pads (purchase at local drug store).  Knee replacements should wrap with an ace wrap.  You may shower with waterproof dressing on. Your surgical bandage will be removed by your home therapist 1 week after surgery. If you have staples intact, home care will remove in 2 weeks. If you have sutures intact, you will need to return to the office in 2 weeks for suture removal. Once the dressing is removed by home care, you may continue to shower. Let soap and water wash over the wound. DO NOT SCRUB.  Steri-strips under the bandage will remain in place until they fall off on their own.  If they are loose, you may gently remove.  If they have not fallen off in two weeks, gently peel them off. Do not remove if pulling causes resistance against the incision.  You will see suture tails sticking out of the ends of the incision.  DO NOT CUT THEM.  They will fall off when the sutures dissolve.  If they are bothersome, cover with a band aid.  Do not soak in a bath tub, hot tub, pool or lake until you are 8 weeks out from surgery.  Do not apply lotions, creams or ointments until you are 6 weeks out from surgery,    PAIN, SWELLING, BRUISING & CLICKING  Pain and swelling are a natural part of your recovery which is considered normal for up to a year after surgery.  Symptoms may be treated with movement, ice, compression stockings, elevating your leg, and by following the pain medication regimen as prescribed.  Bruising is normal for several weeks after surgery. You may also have leg swelling and pain in your shin.  You may ice areas that are tender to help with discomfort.  You are required to wear the  provided compression stockings, every day, for 4 weeks following surgery.  Remove the stockings at night and place them back on in the morning.  Pain and swelling may temporarily increase with an increase in activity or exercise.  Use ice after activity.  Audible clicking with movement or exercises is considered normal following joint replacement.  If this persists at 6 months or 1 year, please notify your surgeon.  You may also feel decreased sensation or numbness near the incision site.  This is normal and sensation may or may not return.    PERSONAL HYGIENE  You may shower upon discharging from the hospital.  Soap and water is permitted to run over the surgical dressing, steri-strips and incision.  Do not scrub directly over these items.  DO NOT soak your incision in a bath, hot tub, pool or pond/lake for a minimum of 8 weeks following your surgery.  DO NOT use lotions, creams, ointments on your wound for a minimum of 6 weeks following your surgery. At that time you may use vitamin E to assist with softening of your incision.      RESTARTING HOME ROUTINE - DIET & MEDICATIONS  Post-operative constipation can result due to a combination of inactivity, anesthesia and pain medication. To help prevent this, you should increase your water and fiber intake. Physical activity such as walking will also help stimulate the bowels.   You may resume your normal diet when you discharge home.    To avoid constipation, choose foods that help promote good bowel habits, such as foods high in fiber.  You may restart your home medications the following day after your surgery UNLESS you have been given alternate instructions.  Follow the instructions given to you on your hospital discharge instructions for more information regarding your home medications.  IF YOU EXPERIENCE NAUSEA OR DIARRHEA, FOLLOW THE B.R.A.T. DIET UNTIL SYMPTOMS RESOLVE.  If you are experiencing vomiting that lasts more than 24 hours, please contact your joint  nurse.      IN-HOME PHYSICAL THERAPY & OUTPATIENT PHYSICAL THERAPY  In-home physical therapy will start 1-2 days after you get home from the hospital.    The home care agency will call within the first 24-48 hours to set up their first visit.  Please do not call your care team to inquire during this timeframe.  Continue the exercises you were given in the hospital until you have been seen by in-home therapy.  Make sure to provide a phone number with the ability for the home care staff to leave a message if you do not answer your phone.    Outpatient physical therapy following knee replacement surgery should begin 2-3 weeks after surgery.  You will be given physical therapy order prior to discharge from the hospital. You should call to schedule this appointment ASAP if not already scheduled before surgery.  Waiting until you are ready for outpatient physical therapy will cause a delay in your care.  You may choose any outpatient physical therapy location.      EMERGENCIES - WHEN TO CONTACT THE SURGEON'S OFFICE IMMEDIATELY  Fever >101 with chills that has been present for at least 48 hours.   Excessive bleeding from incision that will not slow down. A small amount of drainage is normal and expected.  Once pressure is applied and the area is covered, do not continue to check the area regularly.  This will remove pressure and bleeding will continue.  Leave in place for 4-6 hours.  Signs of infection of incision-excessive drainage that is soaking through your dressing (especially if it is pus-like), redness that is spreading out from the edges of your incision, or increased warmth around the area.  Excruciating pain for which the pain medication, taken as instructed, is not helping.  Severe calf pain.  Go directly to the emergency room or call 911, if you are experiencing chest pain or difficulty breathing.    After Hour and Weekend Emergency Answering Service [phone]    ICE & COLD THERAPY INSTRUCTIONS    To assist  with pain control and post-op swelling, you should be using ice regularly throughout recovery, especially for the first 6 weeks, regardless of the cold therapy method you use.      Always make sure there is a layer of protection between the cold pad and your skin.    If you are using ICE PACKS or GEL PACKS, you will need to alternate 20 minutes on, 20 minutes off twice per hour.    If you are using an ICE MACHINE, please follow the provided ice machine instructions.  These devices differ from ice or ice packs whereas the mechanism circulates water through tubing and a pad to provide longer periods of cold therapy to the desired site.  You can use your cold devices around the clock for optimal comfort.  We recommend using cold therapy after working with therapy or completing exercises on your own.  There is no set schedule in which you must follow while using cold therapy.  Below are a few points to remember when using a cold therapy device:    You do not need to need to use the 20 on, 20 off method.  Detach the pad from the cooler and ambulate at least once every hour.  You can check your skin under the pad at this time.  You may wear the cold therapy device during periods of sleep including overnight.  If you wake up during the night, you can check the skin at this time.  You do not need to wake up specifically to perform skin checks.  Empty the cooler and pad when device is not in use.  Follow 's instructions for cleaning your cold therapy device.    DISCHARGE MEDICATIONS - Please reference the sample schedule on the reverse side for instructions on how to best schedule medications.    PAIN MEDICATION    ___X_ Tramadol / Oxycodone  Tramadol and Oxycodone have been prescribed for post-operative pain control.    These medications will only be refilled ONCE every 7 days for a period of up to 6 weeks following surgery.  After 6 weeks, you will transition to acetaminophen and over -the- counter  anti-inflammatories such as Ibuprofen, Advil or Aleve in conjunction with ICE/COLD THERAPY.   Side effects may be constipation and nausea, vomiting, sleepiness, dizziness, lightheadedness, headache, blurred vision, dry mouth sweating, itching (if you have itching, over-the -counter Benadryl can be used as needed).  You may NOT operate a motor vehicle while taking these medications or have been cleared by your care team.     ___X_ Acetaminophen (Tylenol)  Acetaminophen has been prescribed as an adjunct for pain control. Take two 500 mg tablets every 6 hours for 4 weeks. You will not receive a refill on this medication.  Do not exceed 4000mg of acetaminophen within a 24 hour period.  Side effects may include nausea, heartburn, drowsiness, and headache.    ___X_ Meloxicam (Mobic)-Meloxicam has been prescribed as an adjunct anti-inflammatory to assist in pain control.    Take one 15mg tablet once daily for 4 weeks.  You will not receive refills on this medication.   Side effects may include nausea.  May not be prescribed if you are on a more potent blood thinner than aspirin or have chronic kidney disease.    BLOOD THINNER    ___X_ Blood Thinner   A blood thinner has been prescribed to prevent blood clots in your leg or lungs. Take as prescribed on the bottle for 4 weeks. You will not receive a refill on this medication.    ANTI NAUSEA    ___X_ Proton Pump Inhibitor (PPI)-Stomach Acid Reduction Medication  If you are already on a PPI, you will continue your regular medication. If you are not, you will be prescribed Pantoprazole to help with nausea and protect your stomach while taking pain medication.  You will not receive a refill on this medication.    STOOL SOFTENERS    ___X_ Colace (Docusate Sodium) & Miralax (polyethylene glycol)  Take both medications to help with constipation while using the Oxycodone and Tramadol for pain control.  You will not receive a refill on this medication.    Continued Constipation  If  you continue to be constipated despite daily use of Miralax and Colace, you try an over-the-counter Dulcolax Suppository and use per instructions on the package.       SPECIAL INSTRUCTIONS   Please take aspirin 81mg two times per day to prevent blood clots.    You will not receive refills on the following medications.   Acetaminophen (Tylenol  Meloxicam  Miralax  Colace  Proton Pump Inhibitor (PPI)  Blood Thinner    Pain Medication Refills - Ortho Nurse Navigator or MyChart- Monday through Friday 7am-1pm    FOLLOW-UP- You should have an appointment with either Dr. Jaramillo or VALENTIN Farris in 6 weeks.         SAMPLE              The times below are an example of how to organize medications to optimize pain control  Your actual medication schedule may vary based on your last dose taken IN THE HOSPITAL      Time 3:00 am 6:00 am 9:00 am 12:00 pm 3:00 pm 6:00 pm 9:00 pm 12:00 am   Medications Tramadol Tylenol  Oxycodone  Miralax   Blood Thinner  Colace  Pantoprazole or other PPI  Tramadol  Meloxicam Tylenol  Oxycodone Tramadol Tylenol  Oxycodone  Miralax Blood Thinner  Colace  Tramadol   Tylenol  Oxycodone            You may begin to wean off the pain medication as your pain remains controlled with increased activity.  The schedules provided are meant to serve as an example.  You may wean off based on your pain control.  Please note that pain medications are not filled beyond 6 weeks after surgery.              The times below are an example of how to WEAN OFF medications WHILE CONTINUING TO OPTIMIZE PAIN CONTROL.  Your actual medication schedule may vary based on your last dose taken.    Time 12:00am 4:00am 8:00am 12:00pm 4:00pm 8:00pm   Med Tramadol Oxycodone   Tramadol Oxycodone Tramadol Oxycodone     Time 12:00am 6:00am 12:00pm 6:00pm   Med Tramadol Oxycodone   Tramadol Oxycodone     Time 12:00am 8:00am 4:00pm   Med Tramadol Oxycodone   Tramadol     Time 12:00am 12:00pm   Med Tramadol Tramadol         TOTAL  KNEE REPLACEMENT PATIENTS SHOULD TRANSITION TO OUTPATIENT PHYSICAL THERAPY NO MORE THAN 3 WEEKS FOLLOWING SURGERY.  PLEASE SEE THE LIST OF  FACILITIES BELOW.  CALL TO SCHEDULE YOUR FIRST APPOINTMENT BEFORE YOU HAVE YOUR SURGERY.

## 2025-07-14 ENCOUNTER — ANESTHESIA EVENT (OUTPATIENT)
Dept: OPERATING ROOM | Facility: HOSPITAL | Age: 80
End: 2025-07-14
Payer: MEDICARE

## 2025-07-14 ENCOUNTER — HOME HEALTH ADMISSION (OUTPATIENT)
Dept: HOME HEALTH SERVICES | Facility: HOME HEALTH | Age: 80
End: 2025-07-14
Payer: MEDICARE

## 2025-07-14 ENCOUNTER — APPOINTMENT (OUTPATIENT)
Dept: RADIOLOGY | Facility: HOSPITAL | Age: 80
End: 2025-07-14
Payer: MEDICARE

## 2025-07-14 ENCOUNTER — PHARMACY VISIT (OUTPATIENT)
Dept: PHARMACY | Facility: CLINIC | Age: 80
End: 2025-07-14
Payer: COMMERCIAL

## 2025-07-14 ENCOUNTER — HOSPITAL ENCOUNTER (OUTPATIENT)
Facility: HOSPITAL | Age: 80
Discharge: HOME HEALTH | End: 2025-07-15
Attending: ORTHOPAEDIC SURGERY | Admitting: ORTHOPAEDIC SURGERY
Payer: MEDICARE

## 2025-07-14 ENCOUNTER — ANESTHESIA (OUTPATIENT)
Dept: OPERATING ROOM | Facility: HOSPITAL | Age: 80
End: 2025-07-14
Payer: MEDICARE

## 2025-07-14 DIAGNOSIS — M16.12 UNILATERAL PRIMARY OSTEOARTHRITIS, LEFT HIP: ICD-10-CM

## 2025-07-14 DIAGNOSIS — M16.12 PRIMARY OSTEOARTHRITIS OF LEFT HIP: Primary | ICD-10-CM

## 2025-07-14 DIAGNOSIS — Z96.649 STATUS POST HIP REPLACEMENT, UNSPECIFIED LATERALITY: ICD-10-CM

## 2025-07-14 PROBLEM — D64.9 ANEMIA: Status: ACTIVE | Noted: 2024-05-20

## 2025-07-14 PROCEDURE — 2500000004 HC RX 250 GENERAL PHARMACY W/ HCPCS (ALT 636 FOR OP/ED): Mod: JW | Performed by: NURSE ANESTHETIST, CERTIFIED REGISTERED

## 2025-07-14 PROCEDURE — 99221 1ST HOSP IP/OBS SF/LOW 40: CPT | Performed by: STUDENT IN AN ORGANIZED HEALTH CARE EDUCATION/TRAINING PROGRAM

## 2025-07-14 PROCEDURE — 3600000005 HC OR TIME - INITIAL BASE CHARGE - PROCEDURE LEVEL FIVE: Performed by: ORTHOPAEDIC SURGERY

## 2025-07-14 PROCEDURE — 7100000001 HC RECOVERY ROOM TIME - INITIAL BASE CHARGE: Performed by: ORTHOPAEDIC SURGERY

## 2025-07-14 PROCEDURE — 2500000004 HC RX 250 GENERAL PHARMACY W/ HCPCS (ALT 636 FOR OP/ED): Performed by: PHYSICIAN ASSISTANT

## 2025-07-14 PROCEDURE — 2500000001 HC RX 250 WO HCPCS SELF ADMINISTERED DRUGS (ALT 637 FOR MEDICARE OP)

## 2025-07-14 PROCEDURE — A6213 FOAM DRG >16<=48 SQ IN W/BDR: HCPCS | Performed by: ORTHOPAEDIC SURGERY

## 2025-07-14 PROCEDURE — 2780000003 HC OR 278 NO HCPCS: Performed by: ORTHOPAEDIC SURGERY

## 2025-07-14 PROCEDURE — 2500000001 HC RX 250 WO HCPCS SELF ADMINISTERED DRUGS (ALT 637 FOR MEDICARE OP): Performed by: STUDENT IN AN ORGANIZED HEALTH CARE EDUCATION/TRAINING PROGRAM

## 2025-07-14 PROCEDURE — A4649 SURGICAL SUPPLIES: HCPCS | Performed by: ORTHOPAEDIC SURGERY

## 2025-07-14 PROCEDURE — 7100000002 HC RECOVERY ROOM TIME - EACH INCREMENTAL 1 MINUTE: Performed by: ORTHOPAEDIC SURGERY

## 2025-07-14 PROCEDURE — 3700000002 HC GENERAL ANESTHESIA TIME - EACH INCREMENTAL 1 MINUTE: Performed by: ORTHOPAEDIC SURGERY

## 2025-07-14 PROCEDURE — 2500000004 HC RX 250 GENERAL PHARMACY W/ HCPCS (ALT 636 FOR OP/ED): Performed by: ORTHOPAEDIC SURGERY

## 2025-07-14 PROCEDURE — 3600000010 HC OR TIME - EACH INCREMENTAL 1 MINUTE - PROCEDURE LEVEL FIVE: Performed by: ORTHOPAEDIC SURGERY

## 2025-07-14 PROCEDURE — 7100000011 HC EXTENDED STAY RECOVERY HOURLY - NURSING UNIT

## 2025-07-14 PROCEDURE — 2500000004 HC RX 250 GENERAL PHARMACY W/ HCPCS (ALT 636 FOR OP/ED): Performed by: STUDENT IN AN ORGANIZED HEALTH CARE EDUCATION/TRAINING PROGRAM

## 2025-07-14 PROCEDURE — A27130 PR TOTAL HIP ARTHROPLASTY: Performed by: NURSE ANESTHETIST, CERTIFIED REGISTERED

## 2025-07-14 PROCEDURE — RXMED WILLOW AMBULATORY MEDICATION CHARGE

## 2025-07-14 PROCEDURE — C1713 ANCHOR/SCREW BN/BN,TIS/BN: HCPCS | Performed by: ORTHOPAEDIC SURGERY

## 2025-07-14 PROCEDURE — 97530 THERAPEUTIC ACTIVITIES: CPT | Mod: GP

## 2025-07-14 PROCEDURE — 2500000004 HC RX 250 GENERAL PHARMACY W/ HCPCS (ALT 636 FOR OP/ED)

## 2025-07-14 PROCEDURE — 72170 X-RAY EXAM OF PELVIS: CPT

## 2025-07-14 PROCEDURE — 2500000001 HC RX 250 WO HCPCS SELF ADMINISTERED DRUGS (ALT 637 FOR MEDICARE OP): Performed by: PHYSICIAN ASSISTANT

## 2025-07-14 PROCEDURE — 72170 X-RAY EXAM OF PELVIS: CPT | Performed by: RADIOLOGY

## 2025-07-14 PROCEDURE — A27130 PR TOTAL HIP ARTHROPLASTY: Performed by: ANESTHESIOLOGY

## 2025-07-14 PROCEDURE — C1776 JOINT DEVICE (IMPLANTABLE): HCPCS | Performed by: ORTHOPAEDIC SURGERY

## 2025-07-14 PROCEDURE — 2500000005 HC RX 250 GENERAL PHARMACY W/O HCPCS: Performed by: NURSE ANESTHETIST, CERTIFIED REGISTERED

## 2025-07-14 PROCEDURE — 3700000001 HC GENERAL ANESTHESIA TIME - INITIAL BASE CHARGE: Performed by: ORTHOPAEDIC SURGERY

## 2025-07-14 PROCEDURE — 2500000005 HC RX 250 GENERAL PHARMACY W/O HCPCS: Performed by: PHYSICIAN ASSISTANT

## 2025-07-14 PROCEDURE — 2720000007 HC OR 272 NO HCPCS: Performed by: ORTHOPAEDIC SURGERY

## 2025-07-14 PROCEDURE — 27130 TOTAL HIP ARTHROPLASTY: CPT | Performed by: ORTHOPAEDIC SURGERY

## 2025-07-14 PROCEDURE — 99100 ANES PT EXTEME AGE<1 YR&>70: CPT | Performed by: ANESTHESIOLOGY

## 2025-07-14 DEVICE — PINNACLE CANCELLOUS BONE SCREW 6.5MM X 25MM
Type: IMPLANTABLE DEVICE | Site: HIP | Status: FUNCTIONAL
Brand: PINNACLE

## 2025-07-14 DEVICE — SUMMIT FEMORAL STEM 12/14 TAPER TAPER ED W/POROCOAT SIZE 3 STD 135MM
Type: IMPLANTABLE DEVICE | Site: HIP | Status: FUNCTIONAL
Brand: SUMMIT POROCOAT

## 2025-07-14 DEVICE — M-SPEC METAL FEMORAL HEAD 12/14 TAPER DIAMETER 36MM +1.5: Type: IMPLANTABLE DEVICE | Site: HIP | Status: FUNCTIONAL

## 2025-07-14 DEVICE — PINNACLE HIP SOLUTIONS ALTRX POLYETHYLENE ACETABULAR LINER NEUTRAL 36MM ID 52MM OD
Type: IMPLANTABLE DEVICE | Site: HIP | Status: FUNCTIONAL
Brand: PINNACLE ALTRX

## 2025-07-14 DEVICE — PINNACLE GRIPTION ACETABULAR SHELL SECTOR 52MM OD
Type: IMPLANTABLE DEVICE | Site: HIP | Status: FUNCTIONAL
Brand: PINNACLE GRIPTION

## 2025-07-14 RX ORDER — CETIRIZINE HYDROCHLORIDE 10 MG/1
10 TABLET ORAL DAILY PRN
Status: DISCONTINUED | OUTPATIENT
Start: 2025-07-14 | End: 2025-07-15 | Stop reason: HOSPADM

## 2025-07-14 RX ORDER — ONDANSETRON HYDROCHLORIDE 2 MG/ML
INJECTION, SOLUTION INTRAVENOUS AS NEEDED
Status: DISCONTINUED | OUTPATIENT
Start: 2025-07-14 | End: 2025-07-14

## 2025-07-14 RX ORDER — METOCLOPRAMIDE HYDROCHLORIDE 5 MG/ML
10 INJECTION INTRAMUSCULAR; INTRAVENOUS EVERY 6 HOURS PRN
Status: DISCONTINUED | OUTPATIENT
Start: 2025-07-14 | End: 2025-07-15 | Stop reason: HOSPADM

## 2025-07-14 RX ORDER — ACETAMINOPHEN 325 MG/1
650 TABLET ORAL EVERY 4 HOURS PRN
Status: DISCONTINUED | OUTPATIENT
Start: 2025-07-14 | End: 2025-07-15 | Stop reason: HOSPADM

## 2025-07-14 RX ORDER — PHENYLEPHRINE HCL IN 0.9% NACL 1 MG/10 ML
SYRINGE (ML) INTRAVENOUS AS NEEDED
Status: DISCONTINUED | OUTPATIENT
Start: 2025-07-14 | End: 2025-07-14

## 2025-07-14 RX ORDER — HYDRALAZINE HYDROCHLORIDE 20 MG/ML
5 INJECTION INTRAMUSCULAR; INTRAVENOUS EVERY 30 MIN PRN
Status: DISCONTINUED | OUTPATIENT
Start: 2025-07-14 | End: 2025-07-14 | Stop reason: HOSPADM

## 2025-07-14 RX ORDER — ASCORBIC ACID 500 MG
500 TABLET ORAL DAILY
Status: DISCONTINUED | OUTPATIENT
Start: 2025-07-14 | End: 2025-07-15 | Stop reason: HOSPADM

## 2025-07-14 RX ORDER — NALOXONE HYDROCHLORIDE 0.4 MG/ML
0.2 INJECTION, SOLUTION INTRAMUSCULAR; INTRAVENOUS; SUBCUTANEOUS EVERY 5 MIN PRN
Status: DISCONTINUED | OUTPATIENT
Start: 2025-07-14 | End: 2025-07-15 | Stop reason: HOSPADM

## 2025-07-14 RX ORDER — ACETAMINOPHEN 325 MG/1
975 TABLET ORAL ONCE
Status: COMPLETED | OUTPATIENT
Start: 2025-07-14 | End: 2025-07-14

## 2025-07-14 RX ORDER — METOCLOPRAMIDE 10 MG/1
10 TABLET ORAL EVERY 6 HOURS PRN
Status: DISCONTINUED | OUTPATIENT
Start: 2025-07-14 | End: 2025-07-15 | Stop reason: HOSPADM

## 2025-07-14 RX ORDER — PROPOFOL 10 MG/ML
INJECTION, EMULSION INTRAVENOUS AS NEEDED
Status: DISCONTINUED | OUTPATIENT
Start: 2025-07-14 | End: 2025-07-14

## 2025-07-14 RX ORDER — CEFAZOLIN SODIUM 2 G/100ML
2 INJECTION, SOLUTION INTRAVENOUS EVERY 8 HOURS
Status: COMPLETED | OUTPATIENT
Start: 2025-07-14 | End: 2025-07-15

## 2025-07-14 RX ORDER — TRANEXAMIC ACID 1 G/10ML
INJECTION, SOLUTION INTRAVENOUS AS NEEDED
Status: DISCONTINUED | OUTPATIENT
Start: 2025-07-14 | End: 2025-07-14

## 2025-07-14 RX ORDER — OXYCODONE HYDROCHLORIDE 5 MG/1
10 TABLET ORAL EVERY 4 HOURS PRN
Status: DISCONTINUED | OUTPATIENT
Start: 2025-07-14 | End: 2025-07-15 | Stop reason: HOSPADM

## 2025-07-14 RX ORDER — PREGABALIN 75 MG/1
75 CAPSULE ORAL ONCE
Status: COMPLETED | OUTPATIENT
Start: 2025-07-14 | End: 2025-07-14

## 2025-07-14 RX ORDER — FENTANYL CITRATE 50 UG/ML
50 INJECTION, SOLUTION INTRAMUSCULAR; INTRAVENOUS EVERY 5 MIN PRN
Status: DISCONTINUED | OUTPATIENT
Start: 2025-07-14 | End: 2025-07-14 | Stop reason: HOSPADM

## 2025-07-14 RX ORDER — DOCUSATE SODIUM 100 MG/1
100 CAPSULE, LIQUID FILLED ORAL 2 TIMES DAILY
Status: DISCONTINUED | OUTPATIENT
Start: 2025-07-14 | End: 2025-07-15 | Stop reason: HOSPADM

## 2025-07-14 RX ORDER — FENTANYL CITRATE 50 UG/ML
INJECTION, SOLUTION INTRAMUSCULAR; INTRAVENOUS AS NEEDED
Status: DISCONTINUED | OUTPATIENT
Start: 2025-07-14 | End: 2025-07-14

## 2025-07-14 RX ORDER — ATORVASTATIN CALCIUM 20 MG/1
10 TABLET, FILM COATED ORAL NIGHTLY
Status: DISCONTINUED | OUTPATIENT
Start: 2025-07-15 | End: 2025-07-15

## 2025-07-14 RX ORDER — CHOLECALCIFEROL (VITAMIN D3) 25 MCG
25 TABLET ORAL DAILY
Status: DISCONTINUED | OUTPATIENT
Start: 2025-07-14 | End: 2025-07-15 | Stop reason: HOSPADM

## 2025-07-14 RX ORDER — ONDANSETRON 4 MG/1
4 TABLET, ORALLY DISINTEGRATING ORAL EVERY 8 HOURS PRN
Status: DISCONTINUED | OUTPATIENT
Start: 2025-07-14 | End: 2025-07-15 | Stop reason: HOSPADM

## 2025-07-14 RX ORDER — POLYETHYLENE GLYCOL 3350 17 G/17G
17 POWDER, FOR SOLUTION ORAL DAILY
Status: DISCONTINUED | OUTPATIENT
Start: 2025-07-14 | End: 2025-07-15 | Stop reason: HOSPADM

## 2025-07-14 RX ORDER — CYCLOBENZAPRINE HCL 10 MG
5 TABLET ORAL 3 TIMES DAILY PRN
Status: DISCONTINUED | OUTPATIENT
Start: 2025-07-14 | End: 2025-07-15 | Stop reason: HOSPADM

## 2025-07-14 RX ORDER — BISACODYL 10 MG/1
10 SUPPOSITORY RECTAL DAILY PRN
Status: DISCONTINUED | OUTPATIENT
Start: 2025-07-14 | End: 2025-07-15 | Stop reason: HOSPADM

## 2025-07-14 RX ORDER — KETOROLAC TROMETHAMINE 30 MG/ML
INJECTION, SOLUTION INTRAMUSCULAR; INTRAVENOUS AS NEEDED
Status: DISCONTINUED | OUTPATIENT
Start: 2025-07-14 | End: 2025-07-14

## 2025-07-14 RX ORDER — CEFAZOLIN SODIUM 2 G/100ML
2 INJECTION, SOLUTION INTRAVENOUS ONCE
Status: COMPLETED | OUTPATIENT
Start: 2025-07-14 | End: 2025-07-14

## 2025-07-14 RX ORDER — LIDOCAINE HYDROCHLORIDE 10 MG/ML
INJECTION, SOLUTION EPIDURAL; INFILTRATION; INTRACAUDAL; PERINEURAL AS NEEDED
Status: DISCONTINUED | OUTPATIENT
Start: 2025-07-14 | End: 2025-07-14

## 2025-07-14 RX ORDER — OXYCODONE HYDROCHLORIDE 5 MG/1
5 TABLET ORAL EVERY 6 HOURS PRN
Status: DISCONTINUED | OUTPATIENT
Start: 2025-07-14 | End: 2025-07-14

## 2025-07-14 RX ORDER — SODIUM CHLORIDE, SODIUM LACTATE, POTASSIUM CHLORIDE, CALCIUM CHLORIDE 600; 310; 30; 20 MG/100ML; MG/100ML; MG/100ML; MG/100ML
75 INJECTION, SOLUTION INTRAVENOUS CONTINUOUS
Status: DISCONTINUED | OUTPATIENT
Start: 2025-07-14 | End: 2025-07-14

## 2025-07-14 RX ORDER — IPRATROPIUM BROMIDE 0.5 MG/2.5ML
500 SOLUTION RESPIRATORY (INHALATION) ONCE
Status: DISCONTINUED | OUTPATIENT
Start: 2025-07-14 | End: 2025-07-14 | Stop reason: HOSPADM

## 2025-07-14 RX ORDER — SODIUM CHLORIDE 9 MG/ML
75 INJECTION, SOLUTION INTRAVENOUS CONTINUOUS
Status: DISCONTINUED | OUTPATIENT
Start: 2025-07-14 | End: 2025-07-15 | Stop reason: HOSPADM

## 2025-07-14 RX ORDER — ONDANSETRON HYDROCHLORIDE 2 MG/ML
4 INJECTION, SOLUTION INTRAVENOUS EVERY 8 HOURS PRN
Status: DISCONTINUED | OUTPATIENT
Start: 2025-07-14 | End: 2025-07-15 | Stop reason: HOSPADM

## 2025-07-14 RX ORDER — PANTOPRAZOLE SODIUM 40 MG/1
40 TABLET, DELAYED RELEASE ORAL
Status: DISCONTINUED | OUTPATIENT
Start: 2025-07-15 | End: 2025-07-15 | Stop reason: HOSPADM

## 2025-07-14 RX ORDER — SODIUM CHLORIDE, SODIUM LACTATE, POTASSIUM CHLORIDE, CALCIUM CHLORIDE 600; 310; 30; 20 MG/100ML; MG/100ML; MG/100ML; MG/100ML
100 INJECTION, SOLUTION INTRAVENOUS CONTINUOUS
Status: ACTIVE | OUTPATIENT
Start: 2025-07-14 | End: 2025-07-14

## 2025-07-14 RX ORDER — FENTANYL CITRATE 50 UG/ML
25 INJECTION, SOLUTION INTRAMUSCULAR; INTRAVENOUS EVERY 5 MIN PRN
Status: DISCONTINUED | OUTPATIENT
Start: 2025-07-14 | End: 2025-07-14 | Stop reason: HOSPADM

## 2025-07-14 RX ORDER — TALC
3 POWDER (GRAM) TOPICAL NIGHTLY PRN
Status: DISCONTINUED | OUTPATIENT
Start: 2025-07-14 | End: 2025-07-15 | Stop reason: HOSPADM

## 2025-07-14 RX ORDER — LABETALOL HYDROCHLORIDE 5 MG/ML
5 INJECTION, SOLUTION INTRAVENOUS ONCE AS NEEDED
Status: DISCONTINUED | OUTPATIENT
Start: 2025-07-14 | End: 2025-07-14 | Stop reason: HOSPADM

## 2025-07-14 RX ORDER — SIMETHICONE 80 MG
80 TABLET,CHEWABLE ORAL 4 TIMES DAILY PRN
Status: DISCONTINUED | OUTPATIENT
Start: 2025-07-14 | End: 2025-07-15 | Stop reason: HOSPADM

## 2025-07-14 RX ORDER — KETOROLAC TROMETHAMINE 15 MG/ML
15 INJECTION, SOLUTION INTRAMUSCULAR; INTRAVENOUS EVERY 6 HOURS
Status: DISCONTINUED | OUTPATIENT
Start: 2025-07-14 | End: 2025-07-15 | Stop reason: HOSPADM

## 2025-07-14 RX ORDER — ROPIVACAINE/EPI/CLONIDINE/KET 2.46-0.005
SYRINGE (ML) INJECTION AS NEEDED
Status: DISCONTINUED | OUTPATIENT
Start: 2025-07-14 | End: 2025-07-14 | Stop reason: HOSPADM

## 2025-07-14 RX ORDER — SCOPOLAMINE 1 MG/3D
1 PATCH, EXTENDED RELEASE TRANSDERMAL
Status: DISCONTINUED | OUTPATIENT
Start: 2025-07-14 | End: 2025-07-15 | Stop reason: HOSPADM

## 2025-07-14 RX ORDER — SODIUM CHLORIDE, SODIUM LACTATE, POTASSIUM CHLORIDE, CALCIUM CHLORIDE 600; 310; 30; 20 MG/100ML; MG/100ML; MG/100ML; MG/100ML
50 INJECTION, SOLUTION INTRAVENOUS CONTINUOUS
Status: DISCONTINUED | OUTPATIENT
Start: 2025-07-14 | End: 2025-07-15 | Stop reason: HOSPADM

## 2025-07-14 RX ORDER — ASPIRIN 81 MG/1
81 TABLET ORAL 2 TIMES DAILY
Status: DISCONTINUED | OUTPATIENT
Start: 2025-07-15 | End: 2025-07-15 | Stop reason: HOSPADM

## 2025-07-14 RX ORDER — OXYCODONE HYDROCHLORIDE 5 MG/1
5 TABLET ORAL EVERY 4 HOURS PRN
Refills: 0 | Status: DISCONTINUED | OUTPATIENT
Start: 2025-07-14 | End: 2025-07-15 | Stop reason: HOSPADM

## 2025-07-14 RX ORDER — ALBUTEROL SULFATE 0.83 MG/ML
2.5 SOLUTION RESPIRATORY (INHALATION) ONCE AS NEEDED
Status: DISCONTINUED | OUTPATIENT
Start: 2025-07-14 | End: 2025-07-14 | Stop reason: HOSPADM

## 2025-07-14 RX ORDER — BISACODYL 5 MG
10 TABLET, DELAYED RELEASE (ENTERIC COATED) ORAL DAILY PRN
Status: DISCONTINUED | OUTPATIENT
Start: 2025-07-14 | End: 2025-07-15 | Stop reason: HOSPADM

## 2025-07-14 RX ORDER — MELOXICAM 7.5 MG/1
7.5 TABLET ORAL ONCE
Status: COMPLETED | OUTPATIENT
Start: 2025-07-14 | End: 2025-07-14

## 2025-07-14 RX ORDER — OXYCODONE HCL 10 MG/1
10 TABLET, FILM COATED, EXTENDED RELEASE ORAL ONCE
Status: COMPLETED | OUTPATIENT
Start: 2025-07-14 | End: 2025-07-14

## 2025-07-14 RX ADMIN — PREGABALIN 75 MG: 75 CAPSULE ORAL at 11:59

## 2025-07-14 RX ADMIN — DEXAMETHASONE SODIUM PHOSPHATE 4 MG: 4 INJECTION INTRA-ARTICULAR; INTRALESIONAL; INTRAMUSCULAR; INTRAVENOUS; SOFT TISSUE at 13:44

## 2025-07-14 RX ADMIN — FENTANYL CITRATE 50 MCG: 50 INJECTION, SOLUTION INTRAMUSCULAR; INTRAVENOUS at 13:29

## 2025-07-14 RX ADMIN — FENTANYL CITRATE 25 MCG: 50 INJECTION, SOLUTION INTRAMUSCULAR; INTRAVENOUS at 15:18

## 2025-07-14 RX ADMIN — ACETAMINOPHEN 975 MG: 325 TABLET ORAL at 11:58

## 2025-07-14 RX ADMIN — Medication 100 MCG: at 14:29

## 2025-07-14 RX ADMIN — Medication 100 MCG: at 14:32

## 2025-07-14 RX ADMIN — POLYETHYLENE GLYCOL 3350 17 G: 17 POWDER, FOR SOLUTION ORAL at 21:13

## 2025-07-14 RX ADMIN — TRANEXAMIC ACID 1000 MG: 1 INJECTION, SOLUTION INTRAVENOUS at 13:30

## 2025-07-14 RX ADMIN — Medication 100 MCG: at 14:15

## 2025-07-14 RX ADMIN — KETOROLAC TROMETHAMINE 15 MG: 30 INJECTION, SOLUTION INTRAMUSCULAR at 14:47

## 2025-07-14 RX ADMIN — TRANEXAMIC ACID 1000 MG: 1 INJECTION, SOLUTION INTRAVENOUS at 14:48

## 2025-07-14 RX ADMIN — Medication 100 MCG: at 14:00

## 2025-07-14 RX ADMIN — MEPIVACAINE HYDROCHLORIDE 3.4 ML: 15 INJECTION, SOLUTION EPIDURAL; INFILTRATION at 13:28

## 2025-07-14 RX ADMIN — OXYCODONE HYDROCHLORIDE AND ACETAMINOPHEN 500 MG: 500 TABLET ORAL at 19:22

## 2025-07-14 RX ADMIN — FENTANYL CITRATE 25 MCG: 50 INJECTION, SOLUTION INTRAMUSCULAR; INTRAVENOUS at 14:56

## 2025-07-14 RX ADMIN — Medication 100 MCG: at 14:22

## 2025-07-14 RX ADMIN — CEFAZOLIN SODIUM 2 G: 2 INJECTION, SOLUTION INTRAVENOUS at 13:17

## 2025-07-14 RX ADMIN — Medication 25 MCG: at 19:22

## 2025-07-14 RX ADMIN — POVIDONE-IODINE 1 APPLICATION: 5 SOLUTION TOPICAL at 11:58

## 2025-07-14 RX ADMIN — PROPOFOL 500 MG: 10 INJECTION, EMULSION INTRAVENOUS at 13:30

## 2025-07-14 RX ADMIN — Medication 100 MCG: at 14:38

## 2025-07-14 RX ADMIN — ONDANSETRON 4 MG: 2 INJECTION, SOLUTION INTRAMUSCULAR; INTRAVENOUS at 14:47

## 2025-07-14 RX ADMIN — CEFAZOLIN SODIUM 2 G: 2 INJECTION, SOLUTION INTRAVENOUS at 21:13

## 2025-07-14 RX ADMIN — LIDOCAINE HYDROCHLORIDE 5 ML: 10 INJECTION, SOLUTION EPIDURAL; INFILTRATION; INTRACAUDAL; PERINEURAL at 13:30

## 2025-07-14 RX ADMIN — Medication 100 MCG: at 14:26

## 2025-07-14 RX ADMIN — DOCUSATE SODIUM 100 MG: 100 CAPSULE, LIQUID FILLED ORAL at 21:13

## 2025-07-14 RX ADMIN — OXYCODONE HYDROCHLORIDE 10 MG: 10 TABLET, FILM COATED, EXTENDED RELEASE ORAL at 11:59

## 2025-07-14 RX ADMIN — PROPOFOL 500 MG: 10 INJECTION, EMULSION INTRAVENOUS at 14:15

## 2025-07-14 RX ADMIN — KETOROLAC TROMETHAMINE 15 MG: 15 INJECTION, SOLUTION INTRAMUSCULAR; INTRAVENOUS at 21:13

## 2025-07-14 RX ADMIN — Medication 100 MCG: at 14:06

## 2025-07-14 RX ADMIN — SODIUM CHLORIDE, POTASSIUM CHLORIDE, SODIUM LACTATE AND CALCIUM CHLORIDE: 600; 310; 30; 20 INJECTION, SOLUTION INTRAVENOUS at 13:17

## 2025-07-14 RX ADMIN — SODIUM CHLORIDE 75 ML/HR: 900 INJECTION, SOLUTION INTRAVENOUS at 16:55

## 2025-07-14 RX ADMIN — SCOPOLAMINE 1 PATCH: 1.5 PATCH, EXTENDED RELEASE TRANSDERMAL at 11:59

## 2025-07-14 RX ADMIN — MELOXICAM 7.5 MG: 7.5 TABLET ORAL at 11:58

## 2025-07-14 SDOH — SOCIAL STABILITY: SOCIAL INSECURITY: WITHIN THE LAST YEAR, HAVE YOU BEEN HUMILIATED OR EMOTIONALLY ABUSED IN OTHER WAYS BY YOUR PARTNER OR EX-PARTNER?: NO

## 2025-07-14 SDOH — ECONOMIC STABILITY: HOUSING INSECURITY: IN THE LAST 12 MONTHS, WAS THERE A TIME WHEN YOU WERE NOT ABLE TO PAY THE MORTGAGE OR RENT ON TIME?: NO

## 2025-07-14 SDOH — ECONOMIC STABILITY: INCOME INSECURITY: IN THE PAST 12 MONTHS HAS THE ELECTRIC, GAS, OIL, OR WATER COMPANY THREATENED TO SHUT OFF SERVICES IN YOUR HOME?: NO

## 2025-07-14 SDOH — ECONOMIC STABILITY: HOUSING INSECURITY: IN THE PAST 12 MONTHS, HOW MANY TIMES HAVE YOU MOVED WHERE YOU WERE LIVING?: 0

## 2025-07-14 SDOH — ECONOMIC STABILITY: FOOD INSECURITY: WITHIN THE PAST 12 MONTHS, THE FOOD YOU BOUGHT JUST DIDN'T LAST AND YOU DIDN'T HAVE MONEY TO GET MORE.: NEVER TRUE

## 2025-07-14 SDOH — SOCIAL STABILITY: SOCIAL INSECURITY: ARE YOU OR HAVE YOU BEEN THREATENED OR ABUSED PHYSICALLY, EMOTIONALLY, OR SEXUALLY BY ANYONE?: NO

## 2025-07-14 SDOH — SOCIAL STABILITY: SOCIAL INSECURITY: DO YOU FEEL ANYONE HAS EXPLOITED OR TAKEN ADVANTAGE OF YOU FINANCIALLY OR OF YOUR PERSONAL PROPERTY?: NO

## 2025-07-14 SDOH — ECONOMIC STABILITY: FOOD INSECURITY: WITHIN THE PAST 12 MONTHS, YOU WORRIED THAT YOUR FOOD WOULD RUN OUT BEFORE YOU GOT THE MONEY TO BUY MORE.: NEVER TRUE

## 2025-07-14 SDOH — ECONOMIC STABILITY: TRANSPORTATION INSECURITY: IN THE PAST 12 MONTHS, HAS LACK OF TRANSPORTATION KEPT YOU FROM MEDICAL APPOINTMENTS OR FROM GETTING MEDICATIONS?: NO

## 2025-07-14 SDOH — ECONOMIC STABILITY: HOUSING INSECURITY: AT ANY TIME IN THE PAST 12 MONTHS, WERE YOU HOMELESS OR LIVING IN A SHELTER (INCLUDING NOW)?: NO

## 2025-07-14 SDOH — SOCIAL STABILITY: SOCIAL INSECURITY: WERE YOU ABLE TO COMPLETE ALL THE BEHAVIORAL HEALTH SCREENINGS?: YES

## 2025-07-14 SDOH — SOCIAL STABILITY: SOCIAL INSECURITY: WITHIN THE LAST YEAR, HAVE YOU BEEN AFRAID OF YOUR PARTNER OR EX-PARTNER?: NO

## 2025-07-14 SDOH — SOCIAL STABILITY: SOCIAL INSECURITY: DO YOU FEEL UNSAFE GOING BACK TO THE PLACE WHERE YOU ARE LIVING?: NO

## 2025-07-14 SDOH — ECONOMIC STABILITY: FOOD INSECURITY: HOW HARD IS IT FOR YOU TO PAY FOR THE VERY BASICS LIKE FOOD, HOUSING, MEDICAL CARE, AND HEATING?: NOT HARD AT ALL

## 2025-07-14 SDOH — SOCIAL STABILITY: SOCIAL INSECURITY: DOES ANYONE TRY TO KEEP YOU FROM HAVING/CONTACTING OTHER FRIENDS OR DOING THINGS OUTSIDE YOUR HOME?: NO

## 2025-07-14 SDOH — SOCIAL STABILITY: SOCIAL INSECURITY: HAVE YOU HAD ANY THOUGHTS OF HARMING ANYONE ELSE?: NO

## 2025-07-14 SDOH — SOCIAL STABILITY: SOCIAL INSECURITY: HAVE YOU HAD THOUGHTS OF HARMING ANYONE ELSE?: NO

## 2025-07-14 SDOH — SOCIAL STABILITY: SOCIAL INSECURITY: ARE THERE ANY APPARENT SIGNS OF INJURIES/BEHAVIORS THAT COULD BE RELATED TO ABUSE/NEGLECT?: NO

## 2025-07-14 SDOH — SOCIAL STABILITY: SOCIAL INSECURITY: HAS ANYONE EVER THREATENED TO HURT YOUR FAMILY OR YOUR PETS?: NO

## 2025-07-14 SDOH — SOCIAL STABILITY: SOCIAL INSECURITY: ABUSE: ADULT

## 2025-07-14 SDOH — HEALTH STABILITY: MENTAL HEALTH: CURRENT SMOKER: 0

## 2025-07-14 ASSESSMENT — ACTIVITIES OF DAILY LIVING (ADL)
JUDGMENT_ADEQUATE_SAFELY_COMPLETE_DAILY_ACTIVITIES: YES
DRESSING YOURSELF: INDEPENDENT
TOILETING: INDEPENDENT
WALKS IN HOME: INDEPENDENT
JUDGMENT_ADEQUATE_SAFELY_COMPLETE_DAILY_ACTIVITIES: YES
DRESSING YOURSELF: INDEPENDENT
WALKS IN HOME: INDEPENDENT
PATIENT'S MEMORY ADEQUATE TO SAFELY COMPLETE DAILY ACTIVITIES?: YES
FEEDING YOURSELF: INDEPENDENT
HEARING - RIGHT EAR: HEARING AID
LACK_OF_TRANSPORTATION: NO
ADEQUATE_TO_COMPLETE_ADL: YES
PATIENT'S MEMORY ADEQUATE TO SAFELY COMPLETE DAILY ACTIVITIES?: YES
HEARING - LEFT EAR: HEARING AID
GROOMING: INDEPENDENT
HEARING - RIGHT EAR: HEARING AID
LACK_OF_TRANSPORTATION: NO
GROOMING: INDEPENDENT
FEEDING YOURSELF: INDEPENDENT
TOILETING: INDEPENDENT
BATHING: INDEPENDENT
BATHING: INDEPENDENT
ADEQUATE_TO_COMPLETE_ADL: YES

## 2025-07-14 ASSESSMENT — PAIN - FUNCTIONAL ASSESSMENT
PAIN_FUNCTIONAL_ASSESSMENT: 0-10
PAIN_FUNCTIONAL_ASSESSMENT: FLACC (FACE, LEGS, ACTIVITY, CRY, CONSOLABILITY)
PAIN_FUNCTIONAL_ASSESSMENT: 0-10

## 2025-07-14 ASSESSMENT — PAIN SCALES - GENERAL
PAINLEVEL_OUTOF10: 0 - NO PAIN
PAIN_LEVEL: 0
PAINLEVEL_OUTOF10: 0 - NO PAIN
PAINLEVEL_OUTOF10: 6
PAINLEVEL_OUTOF10: 0 - NO PAIN

## 2025-07-14 ASSESSMENT — COGNITIVE AND FUNCTIONAL STATUS - GENERAL
CLIMB 3 TO 5 STEPS WITH RAILING: A LITTLE
MOBILITY SCORE: 20
HELP NEEDED FOR BATHING: A LITTLE
MOBILITY SCORE: 19
MOVING TO AND FROM BED TO CHAIR: A LITTLE
WALKING IN HOSPITAL ROOM: A LITTLE
DAILY ACTIVITIY SCORE: 20
PERSONAL GROOMING: A LITTLE
PERSONAL GROOMING: A LITTLE
TOILETING: A LITTLE
DRESSING REGULAR LOWER BODY CLOTHING: A LITTLE
CLIMB 3 TO 5 STEPS WITH RAILING: A LITTLE
DAILY ACTIVITIY SCORE: 20
TURNING FROM BACK TO SIDE WHILE IN FLAT BAD: A LITTLE
STANDING UP FROM CHAIR USING ARMS: A LITTLE
WALKING IN HOSPITAL ROOM: A LITTLE
HELP NEEDED FOR BATHING: A LITTLE
DRESSING REGULAR LOWER BODY CLOTHING: A LITTLE
TOILETING: A LITTLE
STANDING UP FROM CHAIR USING ARMS: A LITTLE
MOVING TO AND FROM BED TO CHAIR: A LITTLE
PATIENT BASELINE BEDBOUND: NO

## 2025-07-14 ASSESSMENT — PATIENT HEALTH QUESTIONNAIRE - PHQ9
SUM OF ALL RESPONSES TO PHQ9 QUESTIONS 1 & 2: 0
2. FEELING DOWN, DEPRESSED OR HOPELESS: NOT AT ALL
1. LITTLE INTEREST OR PLEASURE IN DOING THINGS: NOT AT ALL

## 2025-07-14 ASSESSMENT — LIFESTYLE VARIABLES
HOW OFTEN DO YOU HAVE 6 OR MORE DRINKS ON ONE OCCASION: NEVER
AUDIT-C TOTAL SCORE: 0
AUDIT-C TOTAL SCORE: 0
SKIP TO QUESTIONS 9-10: 1
HOW OFTEN DO YOU HAVE A DRINK CONTAINING ALCOHOL: NEVER
HOW MANY STANDARD DRINKS CONTAINING ALCOHOL DO YOU HAVE ON A TYPICAL DAY: PATIENT DOES NOT DRINK

## 2025-07-14 ASSESSMENT — PAIN DESCRIPTION - DESCRIPTORS: DESCRIPTORS: ACHING;SORE

## 2025-07-14 ASSESSMENT — COLUMBIA-SUICIDE SEVERITY RATING SCALE - C-SSRS
6. HAVE YOU EVER DONE ANYTHING, STARTED TO DO ANYTHING, OR PREPARED TO DO ANYTHING TO END YOUR LIFE?: NO
2. HAVE YOU ACTUALLY HAD ANY THOUGHTS OF KILLING YOURSELF?: NO
1. IN THE PAST MONTH, HAVE YOU WISHED YOU WERE DEAD OR WISHED YOU COULD GO TO SLEEP AND NOT WAKE UP?: NO

## 2025-07-14 NOTE — CONSULTS
Inpatient consult to Medicine  Consult performed by: Deedee Beltran MD  Consult ordered by: Jaime Jaramillo MD          Reason For Consult  Medical management of postop pain.    History Of Present Illness  Sharifa Moore is a 80 y.o. female who is postop day 0 status post left MIGUEL secondary to osteoarthritis.  She had a spinal block for the procedure.  She reports mild discomfort in her hip, but otherwise has no acute complaints.  Vital signs have been stable postoperatively.     Past Medical History  She has a past medical history of Acute diverticulitis, Allergic, Anemia (2024), Arthritis, Chronic vaginitis (2023), GI bleed (2018), History of arthroplasty of right shoulder (2024), Pain in left hip (2022), Personal history of urinary (tract) infections (2014), Scoliosis, Subacute and chronic vaginitis (2015), Tear of medial meniscus of knee (2023), and Vertigo, benign positional (2023).    Surgical History  She has a past surgical history that includes Colonoscopy (2014);  section, classic (2014);  section, low transverse; [place] tooth extraction; Total shoulder arthroplasty (Right); Foot surgery (Left); and Joint replacement.     Social History  She reports that she has never smoked. She has never used smokeless tobacco. She reports that she does not drink alcohol and does not use drugs.    Family History  Family History[1]     Allergies  Penicillins, Sulfamethoxazole, and Ampicillin    Review of Systems   All other systems reviewed and are negative.       Physical Exam  Constitutional:       General: She is not in acute distress.     Appearance: Normal appearance.   HENT:      Head: Normocephalic and atraumatic.      Nose: Nose normal.      Mouth/Throat:      Mouth: Mucous membranes are moist.      Pharynx: Oropharynx is clear.   Cardiovascular:      Rate and Rhythm: Normal rate and regular rhythm.   Pulmonary:       Effort: Pulmonary effort is normal. No respiratory distress.      Breath sounds: Normal breath sounds.   Abdominal:      General: Bowel sounds are normal.      Palpations: Abdomen is soft.      Tenderness: There is no abdominal tenderness. There is no rebound.   Musculoskeletal:         General: No swelling, deformity or signs of injury.      Cervical back: Normal range of motion and neck supple.   Skin:     General: Skin is warm and dry.   Neurological:      General: No focal deficit present.      Mental Status: She is alert and oriented to person, place, and time. Mental status is at baseline.   Psychiatric:         Attention and Perception: Attention and perception normal.         Mood and Affect: Mood normal.         Behavior: Behavior normal.         Judgment: Judgment normal.          Last Recorded Vitals  /74   Pulse 71   Temp 36.9 °C (98.4 °F) (Temporal)   Resp 18   Wt 71.9 kg (158 lb 8.2 oz)   SpO2 97%     Relevant Results  XR pelvis 1-2 views  Result Date: 7/14/2025  Interpreted By:  Olivia Longo, STUDY: XR PELVIS 1-2 VIEWS 7/14/2025 3:56 pm   INDICATION: Signs/Symptoms:Postop total hip arthroplasty   COMPARISON: Intraoperative film done earlier this same date   ACCESSION NUMBER(S): [id number]   ORDERING CLINICIAN: JOJO JOSEPH   TECHNIQUE: A single portable view of the pelvis is completed.   FINDINGS: There is postoperative change consisting of bipolar left hip arthroplasty with components anatomically aligned in the AP projection. No acute osseous abnormality is seen.       Satisfactory postoperative appearance of the bipolar left hip arthroplasty.   Signed by: Olivia Longo 7/14/2025 4:06 PM Dictation workstation:   [id number]       Assessment/Plan     Left hip osteoarthritis  Management per primary  PT/OT  Pain control, bowel regimen  Antibiotic and DVT prophylaxis per primary    Deedee Beltran MD             [1]   Family History  Problem Relation Name Age of Onset    Osteoporosis  Mother Anne-Marie Conley     Stroke Mother Anne-Marie Conley     Colon cancer Father Haris Conley     Hypertension Father Haris Conley     Migraines Father Haris Conley     Prostate cancer Father Haris Conley     Arthritis Father Hairs Conley     Kidney disease Father Haris Conley     Asthma Daughter Elmira Moore     Diabetes Daughter Elmira Moore     Breast cancer Maternal Grandmother Alena Thomas     Cancer Maternal Grandmother Alena Thomas     Hypertension Other      Cancer Other

## 2025-07-14 NOTE — PROGRESS NOTES
Therapy Communication Note    Patient Name: Sharifa Moore  MRN: [id number]  Department: [facility]  Room: 202/202-A  Today's Date: 7/14/2025     Discipline: Physical Therapy    Comment: RN contacted physical therapy requesting assist with transferring pt to restroom. Pt is a 79 y/o F POD#0 L posterior MIGUEL with Dr. Jaramillo. PT arrived at 1738 to pt in supine with RN present. PT provided pt education on weightbearing status and posterior hip precautions prior to pt mobilizing. Pt able to perform supine>sit transfer to L at supervision level with increased time and Mod verbal cueing from PT for body mechanics and sequencing within posterior hip precautions. Required Carlos to perform STS from raised EOB with cues for hand placement, sequencing, and body mechanics. Pt ambulated 15 feet using rolling walker and Min Assist from PT. Step to pattern, fwd flexed posture, and slow wilmer noted. Pt then performed toilet transfer with L grab bar use and CGA from PT to steady. Cues provided for hand placement and sequencing. Pt maintained posterior hip precautions. Pt left seated on toilet with call bell in reach and instructed to call for assist when finished voiding. Pt dtr at the bedside, RN aware of location. PT exited at 1750.         Jessica Hidalgo, PT, DPT

## 2025-07-14 NOTE — ANESTHESIA PROCEDURE NOTES
Spinal Block    Patient location during procedure: OR  Start time: 7/14/2025 1:23 PM  End time: 7/14/2025 1:28 PM  Reason for block: primary anesthetic  Staffing  Performed: CRNA   Authorized by: Yolande Constantino MD MPH    Performed by: BLAKE Eric    Preanesthetic Checklist  Completed: patient identified, IV checked, risks and benefits discussed, surgical consent, pre-op evaluation, timeout performed and sterile techniques followed  Block Timeout  RN/Licensed healthcare professional reads aloud to the Anesthesia provider and entire team: Patient identity, procedure with side and site, patient position, and as applicable the availability of implants/special equipment/special requirements.  Patient on coagulant treatment: no  Timeout performed at: 7/14/2025 1:20 PM  Spinal Block  Patient position: sitting  Prep: Betadine  Sterility prep: hand hygiene, mask, cap, drape and gloves  Sedation level: no sedation  Patient monitoring: continuous pulse oximetry and heart rate  Approach: midline  Vertebral space: L3-4  Injection technique: single-shot  Needle  Needle type: Quincke   Needle gauge: 25 G  Needle length: 3.5 in  Free flowing CSF: yes    Assessment bilateral  Block outcome: patient comfortable  Procedure assessment: patient tolerated procedure well with no immediate complications  Additional Notes  Expiration 7-31-27

## 2025-07-14 NOTE — NURSING NOTE
Patient arrived to floor from pacu. She is still very sleepy, denies pain at this time. She was oriented to room and encouraged to order something before kitchen closes. Bed alarm on and call light within her reach.

## 2025-07-14 NOTE — NURSING NOTE
Assumed care of patient. Report received from JOHN Lacy. Patient lying in bed watching TV. Vital signs stable, no acute distress. Surgical dressing left hip is clean, dry and intact with ice man cooler in place. She has voided and ambulated since surgery. Denies any pain at this time. No stated needs. Call light is within reach, bed is in lowest locked position with bed alarm activated.

## 2025-07-14 NOTE — NURSING NOTE
Patient ambulated to restroom. Moves slowly but tolerated well. She got up to the recliner after restroom but only stayed in chair for about 30min then assisted her back to bed. She still denies pain. Wants to take a nap. Call light in reach.

## 2025-07-14 NOTE — CARE PLAN
The patient's goals for the shift include  pain control and improved mobility     The clinical goals for the shift include safe ambulation, pain control

## 2025-07-14 NOTE — NURSING NOTE
1529: Patient arrived to PACU drowsy, resting comfortably-Patient able to respond to verbal commands. Cryotherapy pad plugged in and Ashutosh hugger applied to patient.   1551: X-Ray at bedside.  1600: Patient able to void 150ml on bedpan.  1609: Patient resting comfortably in bed able to respond to commands. Patient offers no complaints at this time-Declines oral intake.  1620: Report given to floor nurse Evonne LOPEZ.  1627: Patient stable to be discharged from PACU to floor. Patient reports no pain and reports no nausea since stay in PACU. No episodes of vomiting. Patient offers no complaints and all of patients questions answered. Patient transferred to floor by Red LOPEZ and Kay LOPEZ in stable condition.

## 2025-07-14 NOTE — ANESTHESIA POSTPROCEDURE EVALUATION
Patient: Sharifa Moore    Procedure Summary       Date: 07/14/25 Room / Location: [facility] 05 / [facility]    Anesthesia Start: 1317 Anesthesia Stop:     Procedure: ARTHROPLASTY, HIP, TOTAL, WITHOUT CEMENT (Left: Hip) Diagnosis:       Unilateral primary osteoarthritis, left hip      (Unilateral primary osteoarthritis, left hip [M16.12])    Surgeons: Jaime Jaramillo MD Responsible Provider: Yolande Constantino MD MPH    Anesthesia Type: spinal ASA Status: 2            Anesthesia Type: spinal    Vitals Value Taken Time   /93 07/14/25 15:34   Temp 36.5 07/14/25 15:34   Pulse 72 07/14/25 15:34   Resp 20 07/14/25 15:34   SpO2 97 07/14/25 15:34       Anesthesia Post Evaluation    Patient location during evaluation: PACU  Patient participation: complete - patient participated  Level of consciousness: awake and alert  Pain score: 0  Pain management: adequate  Multimodal analgesia pain management approach  Airway patency: patent  Two or more strategies used to mitigate risk of obstructive sleep apnea  Cardiovascular status: acceptable  Respiratory status: acceptable  Hydration status: acceptable  Postoperative Nausea and Vomiting: none        No notable events documented.

## 2025-07-14 NOTE — OP NOTE
ARTHROPLASTY, HIP, TOTAL, WITHOUT CEMENT (L) Operative Note     Date: 2025  OR Location: [facility]    Name: Sharifa Moore, : 1945, Age: 80 y.o., MRN: [id number], Sex: female    Diagnosis  Pre-op Diagnosis      * Unilateral primary osteoarthritis, left hip [M16.12] Post-op Diagnosis     * Unilateral primary osteoarthritis, left hip [M16.12]     Procedures  ARTHROPLASTY, HIP, TOTAL, WITHOUT CEMENT  99174 - AK ARTHRP ACETBLR/PROX FEM PROSTC AGRFT/ALGRFT      Surgeons      * Jaime Jaramillo - Primary    Resident/Fellow/Other Assistant:  Surgeons and Role:  * No surgeons found with a matching role *    Staff:   Circulator: Alex  Circulator: Lluvia Johnson Person: Jane Johnson Person: Jane Wellerub Person: Snow Villaseñor Circulator: Addie Villaseñor Scrub: Monica    Anesthesia Staff: Anesthesiologist: Yolande Constantino MD MPH  CRNA: ANNAMARIA BartholomewCRNA; BLAKE Eric    Procedure Summary  Anesthesia: Spinal  ASA: II  Estimated Blood Loss: 100mL  Intra-op Medications:   Administrations occurring from 1301 to 1531 on 25:   Medication Name Total Dose   ropivacaine-epinephrine-clonidine-ketorolac 2.46-0.005- 0.0008-0.3mg/mL periarticular syringe 50 mL   dexAMETHasone (Decadron) 4 mg/mL IV Syringe 2 mL 4 mg   fentaNYL (Sublimaze) injection 50 mcg/mL 75 mcg   ketorolac (Toradol) injection 30 mg 15 mg   lactated Ringer's infusion 279.17 mL   lidocaine PF (Xylocaine-MPF) local injection 1 % 5 mL   mepivacaine (Carbocaine) injection 1.5 % 3.4 mL   ondansetron (Zofran) 2 mg/mL injection 4 mg   phenylephrine 100 mcg/mL syringe 10 mL (prefilled) 800 mcg   propofol (Diprivan) injection 10 mg/mL 1,000 mg   tranexamic acid (Cyklokapron) injection 2,000 mg   ceFAZolin (Ancef) 2 g in dextrose (iso)  mL 2 g              Anesthesia Record               Intraprocedure I/O Totals          Intake    Tranexamic Acid 0.00 mL    The total shown is the total volume documented since Anesthesia Start was  filed.    Total Intake 0 mL       Output    Est. Blood Loss 100 mL    Total Output 100 mL       Net    Net Volume -100 mL          Specimen: No specimens collected     Implants:  Implants       Type Name Action Serial No.      Joint Hip ACETABULAR CUP, SECTOR, GRIPTON, SIZE 52MM - [id number] Implanted      Screw SCREW CANCELLOUS 6.5 X 25 - [id number] Implanted      Joint Hip LINER, ALTRX, NEURTAL, 36 X 52MM - [id number] Implanted      Joint Hip HIP STEM, SUMMIT POR 3 STD - [id number] Implanted      Joint Hip HIP BALL ARTIC/SP 36 +1.5 - [id number] Implanted               Findings: advanced OA    Indications: Sharifa Moore is an 80 y.o. female who is having surgery for Unilateral primary osteoarthritis, left hip [M16.12].     The patient was seen in the preoperative area. The risks, benefits, complications, treatment options, non-operative alternatives, expected recovery and outcomes were discussed with the patient. The possibilities of reaction to medication, pulmonary aspiration, injury to surrounding structures, bleeding, recurrent infection, the need for additional procedures, failure to diagnose a condition, and creating a complication requiring transfusion or operation were discussed with the patient. The patient concurred with the proposed plan, giving informed consent.  The site of surgery was properly noted/marked if necessary per policy. The patient has been actively warmed in preoperative area. Preoperative antibiotics have been ordered and given within 1 hours of incision. Venous thrombosis prophylaxis have been ordered including bilateral sequential compression devices    Procedure Details: left MIGUEL  Evidence of Infection: No   Complications:  None; patient tolerated the procedure well.    Disposition: PACU - hemodynamically stable.  Condition: stable     PREOPERATIVE DIAGNOSIS:  left hip osteoarthritis     POSTOPERATIVE DIAGNOSIS:  left hip osteoarthritis     OPERATION/PROCEDURE:  left total hip  arthroplasty     SURGEON:  Jaime Jaramillo MD     ASSISTANT(S):  Elias Mueller MD PGY4     ANESTHESIA:  Spinal     ESTIMATED BLOOD LOSS AND INTRAVENOUS FLUIDS:  Please see Anesthesia record.     LOCATION:  [facility] [place]     COMPONENTS USED:  1.  [place] Gription acetabular sector shell 52  2.  [place] femoral stem tapered with [place], size 3 STD  3.  [place] AltrX polyethylene acetabular liner, neutral, 36/52  4.  M-spec metal femoral head +1.5     BRIEF CLINICAL NOTE:  The patient is a 81 yo female with severe radiographic  osteoarthritis of the left hip.  They failed conservative treatment  and wished to proceed with total hip arthroplasty, which is indicated  at this time.  We discussed the risks, benefits, alternatives of  surgery including, but not limited to, infection, damage to vessel or  nerve, bleeding, soft tissue pain, DVT, PE, problems with anesthesia,  leg length discrepancy, dislocation, continued soft tissue pain, lack  of range of motion, need for further surgery, etc.  Consent was  obtained.  They were taken to the operating room in order to undergo  the procedure.     OPERATIVE REPORT:  The patient was transferred to the operating room table.  Time-out  was performed confirming patient name, medical record number,  surgical site, and adequate and appropriate imaging.  The patient  received appropriate IV antibiotics as well as tranexamic acid prior  to the start of the procedure.  Once we prepped and draped,  posterolateral approach to the hip was performed.  The skin and  subcutaneous tissues were incised sharply.  Hemostasis was obtained  using electrocautery.  The underlying gluteal fascia was identified  and entered using electrocautery followed by Castro scissors.  Charnley  bow was placed.  The short external rotators and capsule were taken  down in one piece and tagged for later reapproximation making sure to protect the sciatic nerve.  The hip was dislocated.  Provisional femoral neck cut was  performed.  The femoral head was removed.  They had extensive degenerative changes bipolarly.  The acetabulum was exposed.  We reamed until we had interference fit and placed a Gription shell in appropriate anteversion and inclination.  Two screws were placed to the cup in the pelvis.  Neutral trial liner was placed.  We turned our attention back to the femur.  The femur was sequentially reamed and broached until we had proximal fit and fill. We then trialed with multiple neck lengths and offsets.  They were stable in position of sleep, flexion, internalrotation, did not impinge in external rotation.  We obtained an intraoperative x-ray.  I was happy with theposition of the components and the stability of the hip.  All trial components were removed.  The wound was thoroughly irrigated.  The real polyethylene liner was  placed.  The stem was seated to the level of broach and the head was joined with the trunion. The hip was relocated.  The short external rotators and capsule were reapproximated to the trochanter through drill holes  using #5 Ethibond.  The surrounding soft tissue was injected with WINNIE solution the fascia was closed with interrupted 0 Vicryl.  The subcu was closed with interrupted 2-0 Vicryl, and the skin was closed running 3-0 Biosyn followed by Dermabond and Steri-Strips.  Dry  sterile dressing was placed.  The patient was transferred back to the hospital bed without evidence of complication.  They will be weightbearing as tolerated.  They will be on ASA and SCDs for DVT  prophylaxis.     Additional Details: WBAT, ASA    Attending Attestation: I was present and scrubbed for the key portions of the procedure.

## 2025-07-14 NOTE — NURSING NOTE
Admission vital signs obtained.  Pt. Oriented to the room.  Call light within reach.  Bed alarm on.  Scd's on.

## 2025-07-14 NOTE — ANESTHESIA PREPROCEDURE EVALUATION
Patient: Sharifa Moore    Procedure Information       Date/Time: 25 1301    Procedure: ARTHROPLASTY, HIP, TOTAL, WITHOUT CEMENT (DePuy [place] Cup, DePuy [place] Stem) ** Overnight ** (Left: Hip) - DePuy [place] Cup, DePuy [place] Stem    Location: [facility] /  [facility]    Surgeons: Jaime Jaramillo MD            Relevant Problems   Anesthesia (within normal limits)      Cardiac   (+) Hypertension   (+) Mixed hyperlipidemia      Pulmonary (within normal limits)      Neuro (within normal limits)      GI   (+) GERD without esophagitis      /Renal (within normal limits)      Liver (within normal limits)      Endocrine (within normal limits)      Hematology   (+) Anemia      Musculoskeletal   (+) Generalized osteoarthrosis of hand   (+) Osteoarthritis of left hip   (+) Primary osteoarthritis of ankle   (+) Primary osteoarthritis of left hip      HEENT   (+) Asymmetrical sensorineural hearing loss      ID (within normal limits)      Skin   (+) Eczema      GYN (within normal limits)     Past Surgical History:   Procedure Laterality Date     SECTION, CLASSIC  2014     Section     SECTION, LOW TRANSVERSE      COLONOSCOPY  2014    Complete Colonoscopy    FOOT SURGERY Left     JOINT REPLACEMENT      TOTAL SHOULDER ARTHROPLASTY Right     WISDOM TOOTH EXTRACTION         Clinical information reviewed:   Tobacco  Allergies  Meds   Med Hx  Surg Hx  OB Status  Fam Hx  Soc   Hx        NPO Detail:  NPO/Void Status  Date of Last Liquid: 25  Time of Last Liquid:   Date of Last Solid: 25  Time of Last Solid:   Time of Last Void: 1137         Physical Exam    Airway  Mallampati: I  TM distance: >3 FB  Neck ROM: full  Mouth opening: 3 or more finger widths     Cardiovascular    Dental    Pulmonary    Abdominal          Lab Results   Component Value Date    WBC 7.4 2025    HGB 13.5 2025    HCT 41.4 2025    MCV 90 2025      06/18/2025       Chemistry    Lab Results   Component Value Date/Time     06/18/2025 1524     02/28/2025 1431    K 3.5 06/18/2025 1524    K 4.1 02/28/2025 1431     06/18/2025 1524     02/28/2025 1431    CO2 25 06/18/2025 1524    CO2 25 02/28/2025 1431    BUN 17 06/18/2025 1524    BUN 17 02/28/2025 1431    CREATININE 0.61 06/18/2025 1524    CREATININE 0.77 02/28/2025 1431    Lab Results   Component Value Date/Time    CALCIUM 9.7 06/18/2025 1524    CALCIUM 9.7 02/28/2025 1431    ALKPHOS 83 06/18/2025 1524    ALKPHOS 107 02/28/2025 1431    AST 21 06/18/2025 1524    AST 21 02/28/2025 1431    ALT 24 06/18/2025 1524    ALT 20 02/28/2025 1431    BILITOT 1.0 06/18/2025 1524    BILITOT 0.7 02/28/2025 1431            Anesthesia Plan    History of general anesthesia?: yes  History of complications of general anesthesia?: no    ASA 2     spinal     The patient is not a current smoker.  Patient was not previously instructed to abstain from smoking on day of procedure.  Patient did not smoke on day of procedure.  Education provided regarding risk of obstructive sleep apnea.  intravenous induction   Postoperative pain plan includes opioids.  Anesthetic plan and risks discussed with patient.  Use of blood products discussed with patient who consented to blood products.    Plan discussed with CRNA and CAA.

## 2025-07-14 NOTE — DISCHARGE SUMMARY
MD Julia William, JACKYS, PARaminC, ATC  Adult Reconstruction and Joint Replacement Surgery  Phone: [phone]     Fax:[phone]             Discharge Summary    Discharge Diagnosis  Left Total Hip Arthroplasty    Issues Requiring Follow-Up  Home care services to start within 48 hours. Outpatient PT to start 2 weeks  S/P total Joint for Knees only. Hips optional.    Test Results Pending At Discharge  Pending Labs       No current pending labs.          Hospital Course  Patient underwent Left Total Hip Arthroplasty on 7/14/25 without complications. The patient was then taken to the PACU in stable condition. Patient was then transferred to the or.  Pain was appropriately controlled. Diet was advanced as tolerated. Patient progressed adequately through their recovery during hospital stay including PT/ OT and were recommended for discharge. Patient was then discharged on  to home in stable condition. Patient had uneventful hospital course. Patient was instructed on the use of pain medications as needed for pain. The signs and symptoms of infection were discussed and the patient was given our number to call should they have any questions or concerns following discharge.    Based on my clinical judgment, the patient was provided with a 7-day prescription for opioid medication at 30 MED, indicated for treatment of acute pain in the setting of recent Total Joint Arthroplasty. OARRS report was run and has demonstrated an appropriate time course.  The patient has been provided with counseling pertaining to safe use of opioid medication.    Patient may use operative extremity WBAT with use of walker for assistance with ambulation .  Mepilex dressing to be removed POD # 7 by home care and incision left open to air  OAC for DVT prophylaxis started on POD #1 and to be taken for 30 days    Patient is to follow-up in 6 weeks at scheduled post-op visit.     Face-to Face after surgery progress  note  Pertinent Physical Exam At Time of Discharge  Review of Systems   Constitutional: Negative.  Negative for activity change, chills, fatigue and fever.   HENT: Negative.     Eyes: Negative.    Respiratory: Negative.  Negative for cough, chest tightness, shortness of breath and wheezing.    Cardiovascular: Negative.  Negative for palpitations.   Gastrointestinal:  Negative for abdominal pain, blood in stool, nausea and vomiting.   Endocrine: Negative.  Negative for cold intolerance and polyuria.   Genitourinary: Negative.  Negative for difficulty urinating, dysuria, frequency, hematuria and urgency.   Musculoskeletal:  Positive for gait problem and joint swelling. Negative for arthralgias and back pain.   Skin: Negative.  Negative for color change, pallor, rash and wound.   Allergic/Immunologic: Negative.  Negative for environmental allergies.   Neurological:  Negative for dizziness, weakness and light-headedness.   Hematological: Negative.    Psychiatric/Behavioral:  Negative for agitation, confusion and suicidal ideas. The patient is not nervous/anxious.    All other systems reviewed and are negative    Physical Exam  side: left lower extremity  Vitals and nursing note reviewed. VSS, Afebrile  Constitutional:       Appearance: Normal appearance, awake and alert.  HENT:      Head: Normocephalic and atraumatic.       Pupils: Pupils are equal, round, and reactive to light.   Cardiovascular:      Rate and Rhythm: Normal rate and regular rhythm.   Pulmonary:      Effort: Pulmonary effort is normal.     Abdominal:         Palpations: Abdomen is soft.   Musculoskeletal:   Sensation intact bilaterally, sural/saph/sp/tibal n.  Motor intact flexion/extension/DF/PF/EHL/FHL bilaterally. Palpable symmetric DP/PT pulse bilaterally. Spinal wearing off.    Skin:      Bulky Dressing intact to the surgical extremity. No signs of gross bloody or purulent drainage.     General: Skin is warm and dry.      Capillary Refill:  Capillary refill takes less than 2 seconds.   Neurological:      General: No focal deficit present.      Mental Status: She is alert and oriented to person, place, and time. Mental status is at baseline.   Psychiatric:         Mood and Affect: Mood normal.        Home Medications  Scheduled medications  Current Medications[1]     PRN medications  PRN Medications[2]    Discharge medications     Your medication list        START taking these medications        Instructions Last Dose Given Next Dose Due   acetaminophen 500 mg tablet  Commonly known as: Tylenol Extra Strength  Replaces: Tylenol Arthritis Pain 650 mg ER tablet      Take 2 tablets (1,000 mg) by mouth every 6 hours if needed for mild pain (1 - 3).       aspirin 81 mg EC tablet      Take 1 tablet (81 mg) by mouth 2 times a day.       docusate sodium 100 mg capsule  Commonly known as: Colace      Take 1 capsule (100 mg) by mouth 2 times a day.       oxyCODONE 5 mg immediate release tablet  Commonly known as: Roxicodone      Take 1 tablet (5 mg) by mouth every 6 hours if needed for severe pain (7 - 10) for up to 7 days.       pantoprazole 40 mg EC tablet  Commonly known as: ProtoNix      Take 1 tablet (40 mg) by mouth once daily. Do not crush, chew, or split.       polyethylene glycol 17 gram/dose powder  Commonly known as: Glycolax, Miralax      Mix 17 g of powder and drink once daily. Mix 1 cap (17g) into 8 ounces of fluid.       traMADol 50 mg tablet  Commonly known as: Ultram      Take 1 tablet (50 mg) by mouth every 6 hours if needed for severe pain (7 - 10) for up to 7 days.              CHANGE how you take these medications        Instructions Last Dose Given Next Dose Due   atorvastatin 10 mg tablet  Commonly known as: Lipitor  What changed: when to take this      Take 1 tablet (10 mg) by mouth once daily.       meloxicam 15 mg tablet  Commonly known as: Mobic  What changed:   when to take this  reasons to take this      Take 1 tablet (15 mg) by mouth  once daily.              CONTINUE taking these medications        Instructions Last Dose Given Next Dose Due   ascorbic acid 500 mg tablet  Commonly known as: Vitamin C           clindamycin 300 mg capsule  Commonly known as: Cleocin           cyclobenzaprine 10 mg tablet  Commonly known as: Flexeril      Take 1 tablet (10 mg) by mouth as needed at bedtime for muscle spasms.       fexofenadine 180 mg tablet  Commonly known as: Allegra           Vitamin D3 25 mcg (1,000 units) tablet  Generic drug: cholecalciferol                  STOP taking these medications      ibuprofen 800 mg tablet        Tylenol Arthritis Pain 650 mg ER tablet  Generic drug: acetaminophen  Replaced by: acetaminophen 500 mg tablet                  Where to Get Your Medications        These medications were sent to [facility]  [address] , [address], [facility] [address]      Hours: 8 AM to 6 PM Mon-Fri, 9 AM to 1 PM Saturday Phone: [phone]   acetaminophen 500 mg tablet  aspirin 81 mg EC tablet  docusate sodium 100 mg capsule  meloxicam 15 mg tablet  oxyCODONE 5 mg immediate release tablet  pantoprazole 40 mg EC tablet  polyethylene glycol 17 gram/dose powder  traMADol 50 mg tablet         You have not been prescribed any medications.     Outpatient Follow-Up  Patient to follow-up with /Julia Ramon PA-C.  Thank you for trusting us with your care. You should be scheduled for a follow-up post-surgical visit in 6 weeks.    Special Instructions   Please take aspirin 81mg two times per day to prevent blood clots.    Please read discharge instructions provided by your surgeon before calling with questions as this will delay care.    Medication refills-Oxycodone and Tramadol will be refilled every 7 days per state law. Request refills through [facility] at the institution in which you had surgery or MyChart. All medication requests may take up to 72 hours to refill and refills after Friday 1pm will be refilled on  the next business day.      No future appointments.    DAVID Laureano PA-C ATC  Orthopedic Physician Assisant  Adult Reconstruction and Total Joint Replacement  General Orthopedics  Department of Orthopaedic Surgery  [place] [address]  Mystery[employer] messaging preferred        [1]   Current Facility-Administered Medications:     lactated Ringer's infusion, 75 mL/hr, intravenous, Continuous, Julia Ramon PA-C, Last Rate: 125 mL/hr at 07/14/25 1317, New Bag at 07/14/25 1317    ropivacaine-epinephrine-clonidine-ketorolac 2.46-0.005- 0.0008-0.3mg/mL periarticular syringe, , , PRN, Jaime Jaramillo MD, 50 mL at 07/14/25 1447    scopolamine (Transderm-Scop) patch 1 patch, 1 patch, transdermal, q72h, Julia Ramon PA-C, 1 patch at 07/14/25 1159    Facility-Administered Medications Ordered in Other Encounters:     dexAMETHasone (Decadron) injection, , intravenous, PRN, Neisha L Grobelny, APRN-CRNA, 4 mg at 07/14/25 1344    fentaNYL PF (Sublimaze) injection, , intravenous, PRN, Neisha L Grobelny, APRN-CRNA, 25 mcg at 07/14/25 1456    ketorolac (Toradol) injection, , intravenous, PRN, Neisha L Grobelny, APRN-CRNA, 15 mg at 07/14/25 1447    lidocaine PF (Xylocaine) 10 mg/mL (1 %) injection, , epidural, PRN, Neisha L Grobelny, APRN-CRNA, 5 mL at 07/14/25 1330    mepivacaine (Carbocaine) 15 mg/mL (1.5 %) injection, , intrathecal, PRN, Neisha L Grobelny, APRN-CRNA, 3.4 mL at 07/14/25 1328    ondansetron (Zofran) injection, , intravenous, PRN, Neisha L Grobelny, APRN-CRNA, 4 mg at 07/14/25 1447    phenylephrine in NS (Enrique-Synephrine) 100 mcg/mL syringe, , intravenous, PRN, Neisha L Grobelny, APRN-CRNA, 100 mcg at 07/14/25 1438    propofol (Diprivan) injection, , intravenous, PRN, BLAKE Eric, 500 mg at 07/14/25 1415    tranexamic acid (Cyklokapron) injection, , intravenous, PRN, HIRO Eric-CRNA, 1,000 mg at 07/14/25  1448  [2] PRN medications: ropivacaine-epinephrine-clonidine-ketorolac

## 2025-07-15 ENCOUNTER — DOCUMENTATION (OUTPATIENT)
Dept: HOME HEALTH SERVICES | Facility: HOME HEALTH | Age: 80
End: 2025-07-15
Payer: MEDICARE

## 2025-07-15 VITALS
OXYGEN SATURATION: 98 % | DIASTOLIC BLOOD PRESSURE: 75 MMHG | HEIGHT: 62 IN | SYSTOLIC BLOOD PRESSURE: 133 MMHG | RESPIRATION RATE: 14 BRPM | WEIGHT: 158.51 LBS | TEMPERATURE: 97.5 F | HEART RATE: 82 BPM | BODY MASS INDEX: 29.17 KG/M2

## 2025-07-15 PROBLEM — D64.9 ANEMIA: Status: RESOLVED | Noted: 2024-05-20 | Resolved: 2025-07-15

## 2025-07-15 LAB
ANION GAP SERPL CALC-SCNC: 11 MMOL/L (ref 10–20)
BUN SERPL-MCNC: 15 MG/DL (ref 6–23)
CALCIUM SERPL-MCNC: 8.3 MG/DL (ref 8.6–10.3)
CHLORIDE SERPL-SCNC: 104 MMOL/L (ref 98–107)
CO2 SERPL-SCNC: 25 MMOL/L (ref 21–32)
CREAT SERPL-MCNC: 0.64 MG/DL (ref 0.5–1.05)
EGFRCR SERPLBLD CKD-EPI 2021: 90 ML/MIN/1.73M*2
ERYTHROCYTE [DISTWIDTH] IN BLOOD BY AUTOMATED COUNT: 13.8 % (ref 11.5–14.5)
GLUCOSE SERPL-MCNC: 124 MG/DL (ref 74–99)
HCT VFR BLD AUTO: 34.4 % (ref 36–46)
HGB BLD-MCNC: 11.5 G/DL (ref 12–16)
MCH RBC QN AUTO: 30.1 PG (ref 26–34)
MCHC RBC AUTO-ENTMCNC: 33.4 G/DL (ref 32–36)
MCV RBC AUTO: 90 FL (ref 80–100)
NRBC BLD-RTO: ABNORMAL /100{WBCS}
PLATELET # BLD AUTO: 215 X10*3/UL (ref 150–450)
POTASSIUM SERPL-SCNC: 4.3 MMOL/L (ref 3.5–5.3)
RBC # BLD AUTO: 3.82 X10*6/UL (ref 4–5.2)
SODIUM SERPL-SCNC: 136 MMOL/L (ref 136–145)
WBC # BLD AUTO: 11.2 X10*3/UL (ref 4.4–11.3)

## 2025-07-15 PROCEDURE — 2500000001 HC RX 250 WO HCPCS SELF ADMINISTERED DRUGS (ALT 637 FOR MEDICARE OP)

## 2025-07-15 PROCEDURE — 7100000011 HC EXTENDED STAY RECOVERY HOURLY - NURSING UNIT

## 2025-07-15 PROCEDURE — 99232 SBSQ HOSP IP/OBS MODERATE 35: CPT | Performed by: STUDENT IN AN ORGANIZED HEALTH CARE EDUCATION/TRAINING PROGRAM

## 2025-07-15 PROCEDURE — 97530 THERAPEUTIC ACTIVITIES: CPT | Mod: GP

## 2025-07-15 PROCEDURE — 2500000001 HC RX 250 WO HCPCS SELF ADMINISTERED DRUGS (ALT 637 FOR MEDICARE OP): Performed by: STUDENT IN AN ORGANIZED HEALTH CARE EDUCATION/TRAINING PROGRAM

## 2025-07-15 PROCEDURE — 2500000004 HC RX 250 GENERAL PHARMACY W/ HCPCS (ALT 636 FOR OP/ED): Mod: JZ

## 2025-07-15 PROCEDURE — 97116 GAIT TRAINING THERAPY: CPT | Mod: GP

## 2025-07-15 PROCEDURE — 36415 COLL VENOUS BLD VENIPUNCTURE: CPT

## 2025-07-15 PROCEDURE — 80048 BASIC METABOLIC PNL TOTAL CA: CPT

## 2025-07-15 PROCEDURE — 97161 PT EVAL LOW COMPLEX 20 MIN: CPT | Mod: GP

## 2025-07-15 PROCEDURE — 85027 COMPLETE CBC AUTOMATED: CPT

## 2025-07-15 RX ORDER — ATORVASTATIN CALCIUM 20 MG/1
10 TABLET, FILM COATED ORAL DAILY
Status: DISCONTINUED | OUTPATIENT
Start: 2025-07-15 | End: 2025-07-15 | Stop reason: HOSPADM

## 2025-07-15 RX ADMIN — ASPIRIN 81 MG: 81 TABLET, COATED ORAL at 08:52

## 2025-07-15 RX ADMIN — POLYETHYLENE GLYCOL 3350 17 G: 17 POWDER, FOR SOLUTION ORAL at 08:51

## 2025-07-15 RX ADMIN — DOCUSATE SODIUM 100 MG: 100 CAPSULE, LIQUID FILLED ORAL at 08:51

## 2025-07-15 RX ADMIN — KETOROLAC TROMETHAMINE 15 MG: 15 INJECTION, SOLUTION INTRAMUSCULAR; INTRAVENOUS at 08:51

## 2025-07-15 RX ADMIN — ATORVASTATIN CALCIUM 10 MG: 20 TABLET, FILM COATED ORAL at 08:52

## 2025-07-15 RX ADMIN — CEFAZOLIN SODIUM 2 G: 2 INJECTION, SOLUTION INTRAVENOUS at 05:27

## 2025-07-15 RX ADMIN — PANTOPRAZOLE SODIUM 40 MG: 40 TABLET, DELAYED RELEASE ORAL at 06:12

## 2025-07-15 RX ADMIN — OXYCODONE HYDROCHLORIDE AND ACETAMINOPHEN 500 MG: 500 TABLET ORAL at 08:51

## 2025-07-15 RX ADMIN — Medication 25 MCG: at 08:51

## 2025-07-15 RX ADMIN — KETOROLAC TROMETHAMINE 15 MG: 15 INJECTION, SOLUTION INTRAMUSCULAR; INTRAVENOUS at 03:07

## 2025-07-15 ASSESSMENT — COGNITIVE AND FUNCTIONAL STATUS - GENERAL
MOVING TO AND FROM BED TO CHAIR: A LITTLE
MOBILITY SCORE: 23
MOBILITY SCORE: 20
WALKING IN HOSPITAL ROOM: A LITTLE
HELP NEEDED FOR BATHING: A LITTLE
CLIMB 3 TO 5 STEPS WITH RAILING: A LITTLE
DAILY ACTIVITIY SCORE: 20
DRESSING REGULAR LOWER BODY CLOTHING: A LITTLE
PERSONAL GROOMING: A LITTLE
TOILETING: A LITTLE
CLIMB 3 TO 5 STEPS WITH RAILING: A LITTLE
STANDING UP FROM CHAIR USING ARMS: A LITTLE

## 2025-07-15 ASSESSMENT — PAIN SCALES - GENERAL
PAINLEVEL_OUTOF10: 0 - NO PAIN
PAINLEVEL_OUTOF10: 0 - NO PAIN
PAINLEVEL_OUTOF10: 2
PAINLEVEL_OUTOF10: 4
PAINLEVEL_OUTOF10: 1

## 2025-07-15 ASSESSMENT — PAIN - FUNCTIONAL ASSESSMENT
PAIN_FUNCTIONAL_ASSESSMENT: 0-10

## 2025-07-15 ASSESSMENT — ACTIVITIES OF DAILY LIVING (ADL)
LACK_OF_TRANSPORTATION: NO
ADL_ASSISTANCE: INDEPENDENT
ADLS_ADDRESSED: NO

## 2025-07-15 ASSESSMENT — PAIN DESCRIPTION - DESCRIPTORS
DESCRIPTORS: DISCOMFORT
DESCRIPTORS: SORE

## 2025-07-15 NOTE — PROGRESS NOTES
80 yr old female admitted extended recovery following left hip replacement with Dr. Jaramillo.  Plan is home today with [facility] PT/OT. SOC confirmed on 7/16/25.  Post op follow up on  Thursday Aug 21, 2025 2:40 PM-Daisy.   Daughter to transport home and assist with care as needed.      07/15/25 0915   Discharge Planning   Living Arrangements Family members;Children   Support Systems Family members;Children   Assistance Needed transportation   Type of Residence Private residence   Number of Stairs to Enter Residence 0   Number of Stairs Within Residence 8   Do you have animals or pets at home? Yes   Type of Animals or Pets 1 cat   Who is requesting discharge planning? Provider   Home or Post Acute Services In home services   Type of Home Care Services Home PT;Home OT   Expected Discharge Disposition Home H  ( Home Care)   Does the patient need discharge transport arranged? No   Financial Resource Strain   How hard is it for you to pay for the very basics like food, housing, medical care, and heating? Not hard   Housing Stability   In the last 12 months, was there a time when you were not able to pay the mortgage or rent on time? N   In the past 12 months, how many times have you moved where you were living? 0   At any time in the past 12 months, were you homeless or living in a shelter (including now)? N   Transportation Needs   In the past 12 months, has lack of transportation kept you from medical appointments or from getting medications? no   In the past 12 months, has lack of transportation kept you from meetings, work, or from getting things needed for daily living? No   Patient Choice   Provider Choice list and CMS website (https://medicare.gov/care-compare#search) for post-acute Quality and Resource Measure Data were provided and reviewed with: Patient   Patient / Family choosing to utilize agency / facility established prior to hospitalization No   Stroke Family Assessment   Stroke Family Assessment Needed  No

## 2025-07-15 NOTE — PROGRESS NOTES
Medication Education     Medication education for Sharifa Moore was provided to the patient  for the following medication(s):  Acetaminophen 500  Aspirin 81  Docusate 100  Meloxicam 15  Oxycodone 5  Pantoprazole 40  PEG 17  Tramadol 50    Medication education provided by a Pharmacist:  Proper dose, indication, possible ADRs    Identified potential barriers to education:  None    Method(s) of Education:  Verbal Written materials provided and reviewed    An opportunity to ask questions and receive answers was provided.     Assessment of understanding the patient :  2= meets goals/outcomes    Additional Notes (if applicable):     SLAVA SANDHU, PharmD

## 2025-07-15 NOTE — PROGRESS NOTES
Sharifa Moore is a 80 y.o. female on day 0 of admission presenting with Osteoarthritis of left hip.      Subjective   Patient seen for follow-up of left MIGUEL on postop day 1.  No acute events overnight.  Did well with physical therapy today.  Pain is well-controlled.  No new complaints.       Objective     Last Recorded Vitals  /75 (BP Location: Right arm, Patient Position: Lying)   Pulse 82   Temp 36.4 °C (97.5 °F) (Temporal)   Resp 14   Wt 71.9 kg (158 lb 8.2 oz)   SpO2 98%   Intake/Output last 3 Shifts:    Intake/Output Summary (Last 24 hours) at 7/15/2025 1109  Last data filed at 7/15/2025 0726  Gross per 24 hour   Intake 1526.25 ml   Output 1900 ml   Net -373.75 ml       Admission Weight  Weight: 71.9 kg (158 lb 8.2 oz) (07/14/25 1155)    Daily Weight  07/14/25 : 71.9 kg (158 lb 8.2 oz)    Image Results  XR pelvis 1-2 views  Narrative: Interpreted By:  Olivia Longo,   STUDY:  XR PELVIS 1-2 VIEWS 7/14/2025 3:56 pm      INDICATION:  Signs/Symptoms:Postop total hip arthroplasty      COMPARISON:  Intraoperative film done earlier this same date      ACCESSION NUMBER(S):  [id number]      ORDERING CLINICIAN:  JJOO JOSEPH      TECHNIQUE:  A single portable view of the pelvis is completed.      FINDINGS:  There is postoperative change consisting of bipolar left hip  arthroplasty with components anatomically aligned in the AP  projection. No acute osseous abnormality is seen.      Impression: Satisfactory postoperative appearance of the bipolar left hip  arthroplasty.      Signed by: Olivia Longo 7/14/2025 4:06 PM  Dictation workstation:   [id number]  XR pelvis 1-2 views  Narrative: Interpreted By:  Lisandra Darling,   STUDY:  Single view pelvis.      INDICATION:  Signs/Symptoms:INTRAOP MIGUEL.      COMPARISON:  XR HIP LEFT WITH PELVIS WHEN PERFORMED 2 OR 3 VIEWS 6/18/2025.      ACCESSION NUMBER(S):  [id number]      ORDERING CLINICIAN:  JOJO JOSEPH      FINDINGS:  Intraoperative findings of  left total hip arthroplasty in progress  with acetabular component and femoral spacer device.      No acute fracture or malalignment.      Impression: 1. As above.      MACRO:  None.      Signed by: Lisandra Darling 7/14/2025 2:51 PM  Dictation workstation:   [id number]      Physical Exam  Constitutional:       General: She is not in acute distress.     Appearance: Normal appearance.   HENT:      Head: Normocephalic and atraumatic.      Nose: Nose normal.      Mouth/Throat:      Mouth: Mucous membranes are moist.      Pharynx: Oropharynx is clear.   Cardiovascular:      Rate and Rhythm: Normal rate and regular rhythm.   Pulmonary:      Effort: Pulmonary effort is normal. No respiratory distress.      Breath sounds: Normal breath sounds.   Abdominal:      General: Bowel sounds are normal.      Palpations: Abdomen is soft.      Tenderness: There is no abdominal tenderness. There is no rebound.   Musculoskeletal:         General: No swelling, deformity or signs of injury.      Cervical back: Normal range of motion and neck supple.   Skin:     General: Skin is warm and dry.   Neurological:      General: No focal deficit present.      Mental Status: She is alert and oriented to person, place, and time. Mental status is at baseline.   Psychiatric:         Attention and Perception: Attention and perception normal.         Mood and Affect: Mood normal.         Behavior: Behavior normal.         Judgment: Judgment normal.         Relevant Results                  Results for orders placed or performed during the hospital encounter of 07/14/25 (from the past 24 hours)   CBC   Result Value Ref Range    WBC 11.2 4.4 - 11.3 x10*3/uL    nRBC      RBC 3.82 (L) 4.00 - 5.20 x10*6/uL    Hemoglobin 11.5 (L) 12.0 - 16.0 g/dL    Hematocrit 34.4 (L) 36.0 - 46.0 %    MCV 90 80 - 100 fL    MCH 30.1 26.0 - 34.0 pg    MCHC 33.4 32.0 - 36.0 g/dL    RDW 13.8 11.5 - 14.5 %    Platelets 215 150 - 450 x10*3/uL   Basic metabolic panel   Result  Value Ref Range    Glucose 124 (H) 74 - 99 mg/dL    Sodium 136 136 - 145 mmol/L    Potassium 4.3 3.5 - 5.3 mmol/L    Chloride 104 98 - 107 mmol/L    Bicarbonate 25 21 - 32 mmol/L    Anion Gap 11 10 - 20 mmol/L    Urea Nitrogen 15 6 - 23 mg/dL    Creatinine 0.64 0.50 - 1.05 mg/dL    eGFR 90 >60 mL/min/1.73m*2    Calcium 8.3 (L) 8.6 - 10.3 mg/dL                 Assessment & Plan  Osteoarthritis of left hip    Status post hip replacement, unspecified laterality    Left hip osteoarthritis  Management per primary  PT/OT  Pain control, bowel regimen  Antibiotic and DVT prophylaxis per primary  Stable for discharge           Deedee Beltran MD

## 2025-07-15 NOTE — CARE PLAN
Problem: Pain - Adult  Goal: Verbalizes/displays adequate comfort level or baseline comfort level  Outcome: Met     Problem: Safety - Adult  Goal: Free from fall injury  Outcome: Met     Problem: Discharge Planning  Goal: Discharge to home or other facility with appropriate resources  Outcome: Met     Problem: Chronic Conditions and Co-morbidities  Goal: Patient's chronic conditions and co-morbidity symptoms are monitored and maintained or improved  Outcome: Met     Problem: Nutrition  Goal: Nutrient intake appropriate for maintaining nutritional needs  Outcome: Met     Problem: Pain  Goal: Takes deep breaths with improved pain control throughout the shift  Outcome: Met  Goal: Turns in bed with improved pain control throughout the shift  Outcome: Met  Goal: Walks with improved pain control throughout the shift  Outcome: Met  Goal: Performs ADL's with improved pain control throughout shift  Outcome: Met  Goal: Participates in PT with improved pain control throughout the shift  Outcome: Met  Goal: Free from opioid side effects throughout the shift  Outcome: Met  Goal: Free from acute confusion related to pain meds throughout the shift  Outcome: Met     Problem: Fall/Injury  Goal: Not fall by end of shift  Outcome: Met  Goal: Be free from injury by end of the shift  Outcome: Met  Goal: Verbalize understanding of personal risk factors for fall in the hospital  Outcome: Met  Goal: Verbalize understanding of risk factor reduction measures to prevent injury from fall in the home  Outcome: Met  Goal: Use assistive devices by end of the shift  Outcome: Met  Goal: Pace activities to prevent fatigue by end of the shift  Outcome: Met     Problem: Deep Vein Thrombosis  Goal: I will remain free from complications of deep vein thrombosis and maintain current level of mobility  Outcome: Met

## 2025-07-15 NOTE — PROGRESS NOTES
On the monitor, patient having events of desaturations at 88-89% on room air.  Observed patient with RN, snoring and hard to wake up.  After waking patient, RN explained to patient issues of oxygenation and the need to place patient on oxygen.  Patient was placed on 2LNC, now SpO2 97-98%.  Will continue to monitor patient during the night.    Farooq Galdamez, RRT

## 2025-07-15 NOTE — PROGRESS NOTES
DAVID Laureano, PARaminC, ATC  Orthopedic Physician Assisant  Adult Reconstruction and Total Joint Replacement  General Orthopedics  Department of Orthopaedic Surgery  [place] [id number]      JUDY SuárezC

## 2025-07-15 NOTE — HH CARE COORDINATION
Home Care received a Referral for Physical Therapy and Occupational Therapy. We have processed the referral for a Start of Care on 7/16/25.     If you have any questions or concerns, please feel free to contact us at [phone]. Follow the prompts, enter your five digit zip code, and you will be directed to your care team on CENTL 1.

## 2025-07-15 NOTE — PROGRESS NOTES
Physical Therapy Evaluation & Treatment    Patient Name: Sharifa Moore  MRN: [id number]  Department: [facility]  Room: 202/202  Today's Date: 7/15/2025   Time Calculation  Start Time: 0940  Stop Time: 1025  Time Calculation (min): 45 min    Assessment/Plan   PT Assessment  PT Assessment Results: Decreased strength, Decreased range of motion, Decreased endurance, Impaired balance, Decreased mobility, Decreased coordination, Orthopedic restrictions, Pain  Rehab Prognosis: Good  Barriers to Discharge Home: No anticipated barriers  Evaluation/Treatment Tolerance: Patient tolerated treatment well  End of Session Communication: Bedside nurse  Assessment Comment: Pt presents with impaired functional mobility s/p L THR. Recommend discharge home with intermittent assistance and home health PT. Pt was issued HEP handout. Pt verbalized and demonstrated understanding.    End of Session Patient Position: Up in chair, Alarm on BLE elevated, ice to surgical site, call light in reach, needs met, RN aware.   IP OR SWING BED PT PLAN  Inpatient or Swing Bed: Inpatient  PT Plan  Treatment/Interventions: Bed mobility, Transfer training, Gait training, Stair training, Strengthening, Endurance training, Range of motion, Therapeutic exercise, Therapeutic activity, Home exercise program  PT Plan: Ongoing PT  PT Frequency: BID  PT Discharge Recommendations: Low intensity level of continued care  Equipment Recommended upon Discharge: Wheeled walker  PT Recommended Transfer Status: Assist x1, Assistive device  PT - OK to Discharge: Yes      Subjective     PT Visit Info:  PT Received On: 07/15/25  General Visit Information:  General  Reason for Referral: L posterior MIGUEL on 7/14/2025  Referred By: Dr. Jaramillo  Past Medical History Relevant to Rehab: OA, diverticulitis, R shoulder arthroscopy (5/20/2024), scoliosis, BPPV  Family/Caregiver Present: No  Prior to Session Communication: Bedside nurse  Patient Position Received: Bed, 3 rail  "up  General Comment: L posterior hip post-op dressing dry and intact.    Home Living:  Home Living  Type of Home: House  Lives With: Adult children (dtr)  Home Adaptive Equipment: Walker rolling or standard  Home Layout: Two level, Able to live on main level with bedroom/bathroom  Home Access: Stairs to enter without rails  Entrance Stairs-Rails: None  Entrance Stairs-Number of Steps: 1  Bathroom Shower/Tub: Walk-in shower  Bathroom Toilet: Adaptive toilet seating  Bathroom Equipment: Hand-held shower hose, Built-in shower seat  Home Living Comments: pt normally sleeps upstairs, plans to stay on first floor w/bed/full bath setup until further progression  Prior Level of Function:  Prior Function Per Pt/Caregiver Report  Level of [place]: Independent with ADLs and functional transfers, Independent with homemaking with ambulation  Receives Help From: Family (dtr able to assist)  ADL Assistance: Independent  Homemaking Assistance:  (shared IADLs w/dtr)  Ambulatory Assistance: Independent (w/R knee brace)  Vocational: Retired  Prior Function Comments: pt has been wearing R knee brace PTA as pt reports \"the knee took a toll bc of the hip\", not worn during session; denies recent falls  Precautions:  Precautions  LE Weight Bearing Status: Weight Bearing as Tolerated  Medical Precautions: Fall precautions  Post-Surgical Precautions: Left hip precautions  Precautions Comment: L posterior hip precautions           Objective   Pain:  Pain Assessment  Pain Assessment: 0-10  0-10 (Numeric) Pain Score: 4  Pain Type: Surgical pain  Pain Location: Hip  Pain Orientation: Left  Pain Interventions: Cold applied, Repositioned, Elevated, Emotional support, Ambulation/increased activity  Cognition:  Cognition  Overall Cognitive Status: Within Functional Limits  Orientation Level: Oriented X4  Attention: Within Functional Limits  Memory: Within Funtional Limits (pt reports and appears loopy, likely attributed to pain meds, difficulty " w/ answering homeset up to start, difficulty recalling hip precautions to start, progressed from recall 1 of 3 to 3 of 3 with repetition, handout issued, RN made aware)  Problem Solving: Within Functional Limits  Numeric Reasoning: Within Functional Limits  Abstract Reasoning: Within Functional Limits  Safety/Judgement: Within Functional Limits  Insight: Within function limits  Impulsive: Within functional limits  Processing Speed: Within funtional limits    General Assessments:  Sensation  Light Touch: No apparent deficits       Coordination  Movements are Fluid and Coordinated: No  Lower Body Coordination: post-op deficits    Postural Control  Postural Control: Within Functional Limits    Static Sitting Balance  Static Sitting-Balance Support: Feet supported  Static Sitting-Level of Assistance: Close supervision  Dynamic Sitting Balance  Dynamic Sitting-Balance Support: Feet supported  Dynamic Sitting-Level of Assistance: Close supervision    Static Standing Balance  Static Standing-Balance Support: Bilateral upper extremity supported  Static Standing-Level of Assistance: Close supervision  Dynamic Standing Balance  Dynamic Standing-Balance Support: Bilateral upper extremity supported  Dynamic Standing-Level of Assistance: Close supervision  Functional Assessments:  ADL  ADL's Addressed: No    Bed Mobility  Bed Mobility: Yes  Bed Mobility 1  Bed Mobility 1: Supine to sitting  Level of Assistance 1: Close supervision  Bed Mobility Comments 1: pt performs from flat bed w/use of bedrails, cues for sequencing in order to maintain hip precautions w/good return demo, cues for breathing technique throughout    Transfers  Transfer: Yes  Transfer 1  Transfer From 1: Bed to  Transfer to 1: Chair with arms  Technique 1: Sit to stand, Stand to sit  Transfer Device 1: Walker  Transfer Level of Assistance 1: Contact guard, Close supervision  Trials/Comments 1: cues for hand placements, for controlled eccentric  lowering    Ambulation/Gait Training  Ambulation/Gait Training Performed: Yes  Ambulation/Gait Training 1  Surface 1: Level tile  Device 1: Rolling walker  Gait Support Devices: Gait belt  Assistance 1: Contact guard, Close supervision  Quality of Gait 1: Diminished heel strike, Antalgic, Forward flexed posture, Decreased step length  Comments/Distance (ft) 1: 100'x2; initially step to pattern w/poor positioning and distancing w/AD, occasionally tipping walker, improved sequencing w/cues and trial, progressed to step through gait pattern, no overt LOB    Stairs  Stairs: No (verbal instructions provided and reviewed for platform step to enter home, pt able to repeat sequencing, handout issued)  Extremity/Trunk Assessments:  RUE   RUE : Within Functional Limits  LUE   LUE: Within Functional Limits  RLE   RLE : Within Functional Limits  LLE   LLE : Exceptions to WFL hip ROM/strength limited due to post-op pain and surgeon restrictions.    Treatments:  Therapeutic Exercise  Therapeutic Exercise Performed: Yes B ankle pumps, L quad sets, L gluteal sets, L heel slides, L SAQ, and L hip abduction x 10 reps each.   Outcome Measures:  [facility] Basic Mobility  Turning from your back to your side while in a flat bed without using bedrails: None  Moving from lying on your back to sitting on the side of a flat bed without using bedrails: None  Moving to and from bed to chair (including a wheelchair): None  Standing up from a chair using your arms (e.g. wheelchair or bedside chair): None  To walk in hospital room: None  Climbing 3-5 steps with railing: A little  Basic Mobility - Total Score: 23        Education Documentation  Handouts, taught by Nadya Alston PT at 7/15/2025  9:40 AM.  Learner: Patient  Readiness: Acceptance  Method: Explanation, Demonstration, Handout  Response: Verbalizes Understanding, Demonstrated Understanding    Precautions, taught by Nadya Alston PT at 7/15/2025  9:40 AM.  Learner:  Patient  Readiness: Acceptance  Method: Explanation, Demonstration, Handout  Response: Verbalizes Understanding, Demonstrated Understanding    Body Mechanics, taught by Nadya Alston PT at 7/15/2025  9:40 AM.  Learner: Patient  Readiness: Acceptance  Method: Explanation, Demonstration, Handout  Response: Verbalizes Understanding, Demonstrated Understanding    Home Exercise Program, taught by Nadya Alston PT at 7/15/2025  9:40 AM.  Learner: Patient  Readiness: Acceptance  Method: Explanation, Demonstration, Handout  Response: Verbalizes Understanding, Demonstrated Understanding    Mobility Training, taught by Nadya Alston PT at 7/15/2025  9:40 AM.  Learner: Patient  Readiness: Acceptance  Method: Explanation, Demonstration, Handout  Response: Verbalizes Understanding, Demonstrated Understanding    Education Comments  No comments found.    Nadya Alston PT, DPT

## 2025-07-15 NOTE — NURSING NOTE
Assisted patient to restroom with walker and standby assist. Void x 1. Assisted back to bed and positioned for comfort with ice man cooler in place. Patient ambulates slowly but tolerated well. No stated needs. Call light is within reach and bed alarm is activated.

## 2025-07-15 NOTE — NURSING NOTE
Assumed care of patient at 0700. Patient lying in bed. Is feeling good today and has minimal pain. She has no concerns or needs at this time. Bed alarm on, call light in reach.

## 2025-07-15 NOTE — NURSING NOTE
Morning labs drawn and sent to the lab. Vital signs stable. Assisted to restroom with walker and standby assist. Void x 1. Assisted back to bed and positioned for comfort. Tolerated very well. Denies any pain at this time. Call light is within reach and bed alarm is activated.

## 2025-07-16 ENCOUNTER — HOME CARE VISIT (OUTPATIENT)
Dept: HOME HEALTH SERVICES | Facility: HOME HEALTH | Age: 80
End: 2025-07-16
Payer: MEDICARE

## 2025-07-16 VITALS
DIASTOLIC BLOOD PRESSURE: 20 MMHG | TEMPERATURE: 98.2 F | HEART RATE: 68 BPM | RESPIRATION RATE: 14 BRPM | SYSTOLIC BLOOD PRESSURE: 104 MMHG

## 2025-07-16 PROCEDURE — G0151 HHCP-SERV OF PT,EA 15 MIN: HCPCS | Mod: HHH

## 2025-07-16 PROCEDURE — 169592 NO-PAY CLAIM PROCEDURE

## 2025-07-16 ASSESSMENT — ENCOUNTER SYMPTOMS
PAIN LOCATION - PAIN FREQUENCY: CONSTANT
PERSON REPORTING PAIN: PATIENT
LOSS OF SENSATION IN FEET: 0
SLEEP QUALITY: ADEQUATE
ANGER WITHIN DEFINED LIMITS: 1
SUBJECTIVE PAIN PROGRESSION: WAXING AND WANING
LIMITED RANGE OF MOTION: 1
PAIN LOCATION - PAIN QUALITY: ACHE
LOWEST PAIN SEVERITY IN PAST 24 HOURS: 2/10
DEPRESSION: 0
MUSCLE WEAKNESS: 1
AGGRESSION WITHIN DEFINED LIMITS: 1
PAIN LOCATION: LEFT HIP
PAIN SEVERITY GOAL: 0/10
PAIN LOCATION - PAIN SEVERITY: 2/10
HIGHEST PAIN SEVERITY IN PAST 24 HOURS: 8/10
OCCASIONAL FEELINGS OF UNSTEADINESS: 1
PAIN: 1

## 2025-07-16 ASSESSMENT — ACTIVITIES OF DAILY LIVING (ADL)
AMBULATION ASSISTANCE ON FLAT SURFACES: 1
OASIS_M1830: 03
ENTERING_EXITING_HOME: STAND BY ASSIST

## 2025-07-16 NOTE — SIGNIFICANT EVENT
Thank you for taking my call today regarding your recent joint replacement surgery with Dr. Jaime Jaramillo.      We discussed that: Home Health Care services (physical and/or occupational therapy) have been initiated Your pain is Controlled on the current regimen Will fluctuate throughout recovery with increased activity You are able to tolerate regular activity and exercises The importance of continued cold therapy throughout recovery The importance of following the prescribed precautions by your surgeon You have not had a bowel movement, and we discussed the importance of a well balanced diet, hydration, and continued use of stool softener/laxative as prescribed The importance of continuing blood thinner as prescribed The importance of wearing compression stockings as prescribed    You indicated that all of your questions have been answered at the time of our call.    Please don't hesitate to reach out if you have any additional questions or concerns.    Thank you,  KEKE LindquistN, RN  Rosie Vieyra, RN  Orthopedic Patient Navigator  [facility]   [phone]

## 2025-07-17 ENCOUNTER — HOME CARE VISIT (OUTPATIENT)
Dept: HOME HEALTH SERVICES | Facility: HOME HEALTH | Age: 80
End: 2025-07-17
Payer: MEDICARE

## 2025-07-17 PROCEDURE — G0152 HHCP-SERV OF OT,EA 15 MIN: HCPCS | Mod: HHH

## 2025-07-17 ASSESSMENT — ENCOUNTER SYMPTOMS
LOWEST PAIN SEVERITY IN PAST 24 HOURS: 4/10
PERSON REPORTING PAIN: PATIENT
PAIN LOCATION: LEFT HIP
PAIN: 1
AGGRESSION WITHIN DEFINED LIMITS: 1
HIGHEST PAIN SEVERITY IN PAST 24 HOURS: 7/10
SLEEP QUALITY: ADEQUATE
ANGER WITHIN DEFINED LIMITS: 1

## 2025-07-17 ASSESSMENT — ACTIVITIES OF DAILY LIVING (ADL)
TOILETING: 1
DRESSING_LB_CURRENT_FUNCTION: MINIMUM ASSIST
BATHING_CURRENT_FUNCTION: SUPERVISION
DRESSING_UB_CURRENT_FUNCTION: SUPERVISION
BATHING ASSESSED: 1
TOILETING: INDEPENDENT

## 2025-07-18 ENCOUNTER — HOME CARE VISIT (OUTPATIENT)
Dept: HOME HEALTH SERVICES | Facility: HOME HEALTH | Age: 80
End: 2025-07-18
Payer: MEDICARE

## 2025-07-18 VITALS — HEART RATE: 92 BPM | OXYGEN SATURATION: 96 % | RESPIRATION RATE: 16 BRPM

## 2025-07-18 PROCEDURE — G0151 HHCP-SERV OF PT,EA 15 MIN: HCPCS | Mod: HHH

## 2025-07-18 SDOH — HEALTH STABILITY: PHYSICAL HEALTH: EXERCISE ACTIVITIES SETS: 2

## 2025-07-18 SDOH — HEALTH STABILITY: PHYSICAL HEALTH: PHYSICAL EXERCISE: SIT, STAND, SUPINE

## 2025-07-18 SDOH — HEALTH STABILITY: PHYSICAL HEALTH: EXERCISE ACTIVITY: OPEN/ CLOSED

## 2025-07-18 SDOH — HEALTH STABILITY: PHYSICAL HEALTH: EXERCISE TYPE: THA

## 2025-07-18 SDOH — HEALTH STABILITY: PHYSICAL HEALTH: PHYSICAL EXERCISE: 15

## 2025-07-18 ASSESSMENT — ENCOUNTER SYMPTOMS
LIMITED RANGE OF MOTION: 1
PAIN LOCATION - EXACERBATING FACTORS: ROM
PAIN LOCATION - PAIN DURATION: MIN
PAIN LOCATION - PAIN QUALITY: ACHE
MUSCLE WEAKNESS: 1
PAIN LOCATION: LEFT HIP
PERSON REPORTING PAIN: PATIENT
PAIN LOCATION - RELIEVING FACTORS: CP
PAIN LOCATION - PAIN FREQUENCY: INTERMITTENT
PAIN: 1
PAIN LOCATION - PAIN SEVERITY: 4/10

## 2025-07-18 ASSESSMENT — ACTIVITIES OF DAILY LIVING (ADL)
AMBULATION_DISTANCE/DURATION_TOLERATED: 100 FT
PHYSICAL TRANSFERS ASSESSED: 1
CURRENT_FUNCTION: INDEPENDENT
AMBULATION ASSISTANCE ON FLAT SURFACES: 1

## 2025-07-21 ENCOUNTER — HOME CARE VISIT (OUTPATIENT)
Dept: HOME HEALTH SERVICES | Facility: HOME HEALTH | Age: 80
End: 2025-07-21
Payer: MEDICARE

## 2025-07-21 VITALS — HEART RATE: 94 BPM | OXYGEN SATURATION: 98 % | RESPIRATION RATE: 16 BRPM

## 2025-07-21 PROCEDURE — G0151 HHCP-SERV OF PT,EA 15 MIN: HCPCS | Mod: HHH

## 2025-07-21 SDOH — HEALTH STABILITY: PHYSICAL HEALTH: EXERCISE ACTIVITIES SETS: 2

## 2025-07-21 SDOH — HEALTH STABILITY: PHYSICAL HEALTH: EXERCISE ACTIVITY: OPEN/ CLOSED CHAIN EX

## 2025-07-21 SDOH — HEALTH STABILITY: PHYSICAL HEALTH: PHYSICAL EXERCISE: 15

## 2025-07-21 SDOH — HEALTH STABILITY: PHYSICAL HEALTH: PHYSICAL EXERCISE: SIT, STAND, SUPINE

## 2025-07-21 SDOH — HEALTH STABILITY: PHYSICAL HEALTH: EXERCISE TYPE: THA

## 2025-07-21 ASSESSMENT — ACTIVITIES OF DAILY LIVING (ADL)
AMBULATION ASSISTANCE ON FLAT SURFACES: 1
AMBULATION_DISTANCE/DURATION_TOLERATED: 100 FT
CURRENT_FUNCTION: INDEPENDENT
PHYSICAL TRANSFERS ASSESSED: 1

## 2025-07-21 ASSESSMENT — ENCOUNTER SYMPTOMS
HIGHEST PAIN SEVERITY IN PAST 24 HOURS: 5/10
PAIN LOCATION - PAIN FREQUENCY: INTERMITTENT
PAIN LOCATION - PAIN DURATION: MIN
LOWEST PAIN SEVERITY IN PAST 24 HOURS: 1/10
PAIN LOCATION - RELIEVING FACTORS: MEDS, CP
SUBJECTIVE PAIN PROGRESSION: GRADUALLY IMPROVING
PAIN LOCATION - EXACERBATING FACTORS: ROM
MUSCLE WEAKNESS: 1
PAIN LOCATION - PAIN SEVERITY: 5/10
PAIN SEVERITY GOAL: 0/10
LIMITED RANGE OF MOTION: 1
PAIN: 1
PAIN LOCATION - PAIN QUALITY: ACHE
PERSON REPORTING PAIN: PATIENT
PAIN LOCATION: LEFT HIP

## 2025-07-23 ENCOUNTER — HOME CARE VISIT (OUTPATIENT)
Dept: HOME HEALTH SERVICES | Facility: HOME HEALTH | Age: 80
End: 2025-07-23
Payer: MEDICARE

## 2025-07-23 VITALS — RESPIRATION RATE: 16 BRPM

## 2025-07-23 PROCEDURE — G0151 HHCP-SERV OF PT,EA 15 MIN: HCPCS | Mod: HHH

## 2025-07-23 SDOH — HEALTH STABILITY: PHYSICAL HEALTH: PHYSICAL EXERCISE: 10

## 2025-07-23 SDOH — HEALTH STABILITY: PHYSICAL HEALTH: EXERCISE ACTIVITY: OPEN/ CLOSED CHAIN EX

## 2025-07-23 SDOH — HEALTH STABILITY: PHYSICAL HEALTH: PHYSICAL EXERCISE: SIT, STAND, SUPINE

## 2025-07-23 SDOH — HEALTH STABILITY: PHYSICAL HEALTH: EXERCISE ACTIVITIES SETS: 2

## 2025-07-23 SDOH — HEALTH STABILITY: PHYSICAL HEALTH: EXERCISE TYPE: THA

## 2025-07-23 ASSESSMENT — ACTIVITIES OF DAILY LIVING (ADL)
AMBULATION_DISTANCE/DURATION_TOLERATED: 100 FT
AMBULATION ASSISTANCE ON FLAT SURFACES: 1
HOME_HEALTH_OASIS: 00
PHYSICAL TRANSFERS ASSESSED: 1
CURRENT_FUNCTION: INDEPENDENT
OASIS_M1830: 00

## 2025-07-23 ASSESSMENT — ENCOUNTER SYMPTOMS
SUBJECTIVE PAIN PROGRESSION: GRADUALLY IMPROVING
LIMITED RANGE OF MOTION: 1
PAIN LOCATION - PAIN QUALITY: ACHE
MUSCLE WEAKNESS: 1
PAIN LOCATION - PAIN FREQUENCY: INTERMITTENT
PERSON REPORTING PAIN: PATIENT
PAIN LOCATION - PAIN DURATION: MIN
PAIN SEVERITY GOAL: 0/10
PAIN LOCATION: LEFT HIP
PAIN LOCATION - EXACERBATING FACTORS: MVT
PAIN: 1
LOWEST PAIN SEVERITY IN PAST 24 HOURS: 1/10
PAIN LOCATION - PAIN SEVERITY: 3/10
PAIN LOCATION - RELIEVING FACTORS: CP, MEDS
HIGHEST PAIN SEVERITY IN PAST 24 HOURS: 3/10

## 2025-07-28 ENCOUNTER — EVALUATION (OUTPATIENT)
Dept: PHYSICAL THERAPY | Facility: CLINIC | Age: 80
End: 2025-07-28
Payer: MEDICARE

## 2025-07-28 DIAGNOSIS — Z96.649 STATUS POST HIP REPLACEMENT, UNSPECIFIED LATERALITY: Primary | ICD-10-CM

## 2025-07-28 PROCEDURE — 97161 PT EVAL LOW COMPLEX 20 MIN: CPT | Mod: GP

## 2025-07-28 PROCEDURE — 97110 THERAPEUTIC EXERCISES: CPT | Mod: GP

## 2025-07-28 NOTE — PROGRESS NOTES
Physical Therapy    Physical Therapy Evaluation and Treatment      Patient Name: Sharifa Moore  MRN: [id number]  Today's Date: 7/28/2025    Time Entry:   Time Calculation  Start Time: 1435  Stop Time: 1515  Time Calculation (min): 40 min  PT Evaluation Time Entry  PT Evaluation (Low) Time Entry: 15  PT Therapeutic Procedures Time Entry  Therapeutic Exercise Time Entry: 25  Insurance: Medicare and [employer]  Visit Limit: Med Nec  Visit: 1  LEFS: 36/80    Assessment:  PT Assessment  Assessment Comment: Patient is a 79 y/o female who was referred to outpatient physical therapy status post a left total hip arthroplasty on 7/14/25. She will benefit from medically necessary skilled physical therapy interventions to improve left hip ROM, strength and gait to facilitate daily activities.     Plan:  OP PT Plan  Treatment/Interventions: Cryotherapy, Education/ Instruction, Electrical stimulation, Gait training, Hot pack, Manual therapy, Neuromuscular re-education, Self care/ home management, Therapeutic activities, Therapeutic exercises  PT Plan: Skilled PT  PT Frequency: 2 times per week  Duration: 10 weeks  Onset Date: 07/14/25  Certification Period Start Date: 07/28/25  Certification Period End Date: 10/26/25  Number of Treatments Authorized: Med Nec  Rehab Potential: Excellent  Plan of Care Agreement: Patient    Current Problem:   Problem List Items Addressed This Visit           ICD-10-CM    Status post hip replacement, unspecified laterality - Primary Z96.649    Relevant Orders    Follow Up In Physical Therapy         Subjective    General:  General  Reason for Referral: L MIGUEL  Referred By: Julia Ramon PA-C  Preferred Learning Style: auditory, kinesthetic, verbal, visual, written  General Comment: Patient is a 79 y/o female who was referred to outpatient physical therapy status post a left total hip arthroplasty on 7/14/25. She is transitioning from home health physical therapy to outpatient physical therapy. She  "notes that it is sore around the incision but no pain. She reports increased swelling in the legs since the surgery. Patient reports no falls within the past year. She is icing thoughout the day. The patient is taking Meloxicam, oxycondne and tramadol as needed. She arrives to therapy ambulating with a FWW.  Precautions:   Posterior-lateral MIGUEL surgical precautions  Pain:   0/10  Prior Level of Function:   Independent with ADLs    Objective   Cognition:   WNL  General Assessments:  Right hip ROM  Flexion 100  Extension 25  Abduction 45  ER 40  IR 15    Left hip ROM  Flexion 90  Extension 25  Abduction 35  ER 30  IR not tested     Right hip strength   Flexion 4+/5  Extension 4+/5  Abduction 4+/5  ER 4+/5  IR 4+/5    Left hip strength   Flexion 3+/5  Extension 3+/5  Abduction 3+/5  ER 3+/5  IR 2/5    Functional Assessments:  Gait: shuffling gait pattern with FWW  Balance: single leg balance 0 seconds     Treatments:  Glute sets 2 x 10 x 5\"  Quad sets 2 x 10 x 5\"  Hook lying hip adduction isometrics 2 x 10 x 5\"  Hook lying hip abduction green TB 2 x 10 x 5\"  Bridge 5 x 3\"  SAQ 2 x 10 x 3\"  Nustep x 5'    EDUCATION:   Home exercise program daily.     Goals:  Active       PT Problem       Patient will report compliance with her home exercise program.         Start:  07/28/25    Expected End:  10/26/25            Patient will report improvement via the LEFS to demonstrate improved activity tolerance.         Start:  07/28/25    Expected End:  10/26/25            Patient will demonstrate improvements in left hip strength to 5/5 to facilitate weight bearing activity.         Start:  07/28/25    Expected End:  10/26/25            Patient will ambulate with a normalized gait pattern with least restrictive assistive device.         Start:  07/28/25    Expected End:  10/26/25                      "

## 2025-07-30 ENCOUNTER — HOSPITAL ENCOUNTER (EMERGENCY)
Facility: HOSPITAL | Age: 80
Discharge: HOME | End: 2025-07-30
Payer: MEDICARE

## 2025-07-30 ENCOUNTER — TREATMENT (OUTPATIENT)
Dept: PHYSICAL THERAPY | Facility: CLINIC | Age: 80
End: 2025-07-30
Payer: MEDICARE

## 2025-07-30 ENCOUNTER — APPOINTMENT (OUTPATIENT)
Dept: RADIOLOGY | Facility: HOSPITAL | Age: 80
End: 2025-07-30
Payer: MEDICARE

## 2025-07-30 VITALS
RESPIRATION RATE: 18 BRPM | TEMPERATURE: 97.2 F | HEIGHT: 62 IN | WEIGHT: 158 LBS | BODY MASS INDEX: 29.08 KG/M2 | OXYGEN SATURATION: 98 % | SYSTOLIC BLOOD PRESSURE: 145 MMHG | HEART RATE: 92 BPM | DIASTOLIC BLOOD PRESSURE: 78 MMHG

## 2025-07-30 DIAGNOSIS — Z96.649 STATUS POST HIP REPLACEMENT, UNSPECIFIED LATERALITY: ICD-10-CM

## 2025-07-30 DIAGNOSIS — L03.116 CELLULITIS OF LEFT LOWER EXTREMITY: Primary | ICD-10-CM

## 2025-07-30 LAB
ALBUMIN SERPL BCP-MCNC: 3.9 G/DL (ref 3.4–5)
ALP SERPL-CCNC: 130 U/L (ref 33–136)
ALT SERPL W P-5'-P-CCNC: 25 U/L (ref 7–45)
ANION GAP SERPL CALC-SCNC: 11 MMOL/L (ref 10–20)
AST SERPL W P-5'-P-CCNC: 21 U/L (ref 9–39)
BASOPHILS # BLD AUTO: 0.04 X10*3/UL (ref 0–0.1)
BASOPHILS NFR BLD AUTO: 0.6 %
BILIRUB SERPL-MCNC: 0.8 MG/DL (ref 0–1.2)
BNP SERPL-MCNC: 58 PG/ML (ref 0–99)
BUN SERPL-MCNC: 15 MG/DL (ref 6–23)
CALCIUM SERPL-MCNC: 8.8 MG/DL (ref 8.6–10.3)
CHLORIDE SERPL-SCNC: 104 MMOL/L (ref 98–107)
CO2 SERPL-SCNC: 27 MMOL/L (ref 21–32)
CREAT SERPL-MCNC: 0.63 MG/DL (ref 0.5–1.05)
EGFRCR SERPLBLD CKD-EPI 2021: 90 ML/MIN/1.73M*2
EOSINOPHIL # BLD AUTO: 0.57 X10*3/UL (ref 0–0.4)
EOSINOPHIL NFR BLD AUTO: 8.2 %
ERYTHROCYTE [DISTWIDTH] IN BLOOD BY AUTOMATED COUNT: 14.3 % (ref 11.5–14.5)
GLUCOSE SERPL-MCNC: 93 MG/DL (ref 74–99)
HCT VFR BLD AUTO: 35.5 % (ref 36–46)
HGB BLD-MCNC: 11.5 G/DL (ref 12–16)
IMM GRANULOCYTES # BLD AUTO: 0.04 X10*3/UL (ref 0–0.5)
IMM GRANULOCYTES NFR BLD AUTO: 0.6 % (ref 0–0.9)
LYMPHOCYTES # BLD AUTO: 1.73 X10*3/UL (ref 0.8–3)
LYMPHOCYTES NFR BLD AUTO: 25 %
MCH RBC QN AUTO: 30.3 PG (ref 26–34)
MCHC RBC AUTO-ENTMCNC: 32.4 G/DL (ref 32–36)
MCV RBC AUTO: 94 FL (ref 80–100)
MONOCYTES # BLD AUTO: 0.57 X10*3/UL (ref 0.05–0.8)
MONOCYTES NFR BLD AUTO: 8.2 %
NEUTROPHILS # BLD AUTO: 3.96 X10*3/UL (ref 1.6–5.5)
NEUTROPHILS NFR BLD AUTO: 57.4 %
NRBC BLD-RTO: 0 /100 WBCS (ref 0–0)
PLATELET # BLD AUTO: 297 X10*3/UL (ref 150–450)
POTASSIUM SERPL-SCNC: 3.8 MMOL/L (ref 3.5–5.3)
PROT SERPL-MCNC: 6.6 G/DL (ref 6.4–8.2)
RBC # BLD AUTO: 3.79 X10*6/UL (ref 4–5.2)
SODIUM SERPL-SCNC: 138 MMOL/L (ref 136–145)
WBC # BLD AUTO: 6.9 X10*3/UL (ref 4.4–11.3)

## 2025-07-30 PROCEDURE — 93970 EXTREMITY STUDY: CPT

## 2025-07-30 PROCEDURE — 36415 COLL VENOUS BLD VENIPUNCTURE: CPT

## 2025-07-30 PROCEDURE — 80053 COMPREHEN METABOLIC PANEL: CPT

## 2025-07-30 PROCEDURE — 83880 ASSAY OF NATRIURETIC PEPTIDE: CPT

## 2025-07-30 PROCEDURE — 85025 COMPLETE CBC W/AUTO DIFF WBC: CPT

## 2025-07-30 PROCEDURE — 99284 EMERGENCY DEPT VISIT MOD MDM: CPT | Mod: 25

## 2025-07-30 PROCEDURE — 97116 GAIT TRAINING THERAPY: CPT | Mod: GP,CQ

## 2025-07-30 RX ORDER — CLINDAMYCIN HYDROCHLORIDE 150 MG/1
450 CAPSULE ORAL 3 TIMES DAILY
Qty: 63 CAPSULE | Refills: 0 | Status: SHIPPED | OUTPATIENT
Start: 2025-07-30 | End: 2025-08-06

## 2025-07-30 ASSESSMENT — PAIN SCALES - GENERAL
PAINLEVEL_OUTOF10: 7
PAINLEVEL_OUTOF10: 0 - NO PAIN

## 2025-07-30 ASSESSMENT — PAIN DESCRIPTION - LOCATION: LOCATION: LEG

## 2025-07-30 ASSESSMENT — PAIN DESCRIPTION - PAIN TYPE: TYPE: ACUTE PAIN

## 2025-07-30 ASSESSMENT — PAIN - FUNCTIONAL ASSESSMENT: PAIN_FUNCTIONAL_ASSESSMENT: 0-10

## 2025-07-30 ASSESSMENT — PAIN DESCRIPTION - ORIENTATION: ORIENTATION: LEFT;LOWER

## 2025-07-30 NOTE — DISCHARGE INSTRUCTIONS
You were seen for leg swelling and erythema, your DVT studies did not show any evidence of DVTs however given your redness we are treating you for cellulitis of the left leg.  You should continue to monitor for increasing pain or swelling, if your symptoms worsen or persist you should return for reevaluation, you should follow-up with your orthopedic provider.

## 2025-07-30 NOTE — ED PROVIDER NOTES
History of Present Illness     History provided by: Patient  Limitations to History: None    HPI:  Sharifa Moore is a Sharifapatrice Moore is a 80 female s/p left total hip arthroplasty 2025 presenting to the ED for bilateral left greater than right lower extremity swelling and erythema.  Notes redness started in the left leg 2 days ago and has gotten mildly worse, PT recommended she come to the ED for evaluation of DVT.  She states her hip has been healing well and she has been increasing her strength and walking, no fever or infectious symptoms, no new injuries or wounds.  No shortness of breath or chest pain, no history of DVT, denies history of heart failure, takes daily aspirin.     Physical Exam   Triage vitals:  T 36.2 °C (97.2 °F)  HR 91  /86  RR 18  O2 98 % None (Room air)    General: Awake, alert, in no acute distress, non-toxic appearing  Eyes: Gaze conjugate.  No scleral icterus or injection  HENT: Normo-cephalic, atraumatic. No stridor. External auditory canals without erythema or drainage.  TM's normal in appearance bilaterally without erythema, or bulging  CV: Regular rate, regular rhythm. No MRG. Cap refill less than 2 seconds  Resp: Breathing non-labored, clear to auscultation bilaterally, no accessory muscle use  GI: Soft, non-distended, non-tender. No rebound or guarding.  MSK/Extremities: Able to ambulate, left hip surgical incision site with overlying Steri-Strips clean, dry, no surrounding erythema fluctuance or drainage. Bilateral lower extremity 2+ pitting edema to knee, left lower extremity with erythema laterally minimally warm, posterior calf tenderness bilaterally. No fluctuance crepitus or drainage, no open wounds.   Skin: Warm. Appropriate color  Neuro: Awake and Alert. Face symmetric. Appropriate tone. Moving all extremities equally.    Medical Decision Making & ED Course   Medical Decision Makin female hx prediabetes s/p left total hip arthroplasty 2025  presenting to the ED for left greater than right lower extremity swelling with left lower extremity erythema, has been healing appropriately since hip replacement and notes improving hip pain, no concerns with hip surgical site today, she has no erythema warmth or fluctuance regarding her surgical site.  She does however have bilateral lower extremity edema with some erythema to the left leg, no joint swelling or tenderness, nontoxic-appearing afebrile.  At this time presentation is less consistent with postoperative complication however we did consider DVT due to recent surgery and immobilization, DVT of the bilateral lower extremities was obtained and was negative.  Considered volume overload peripheral edema lymphedema, basic labs obtained including BNP which was not grossly elevated, she is having no pulmonary respiratory symptoms.  Due to erythema and swelling seen on ultrasound imaging there was some concern for cellulitis, feel that outpatient management appropriate at this time however we did discuss need for close follow-up with her primary care provider and surgeon.  She will be started on clindamycin, recommended compression stockings and frequent leg elevation, to return for worsening swelling or pain.  ----       Social Determinants of Health which Significantly Impact Care: None identified     EKG Independent Interpretation: See ED course for my independent interpretation if ECG was obtained.    Independent Result Review and Interpretation: Please see MDM and ED course for my independent interpretation of the results    Chronic conditions affecting the patient's care: Please see H&P and MDM    The patient was discussed with the following consultants/services: None    Care Considerations: As document above in [facility]    ED Course:  ED Course as of 07/30/25 1936 Wed Jul 30, 2025   5913 BNP: 58 [KR]   1932 Vascular US lower extremity venous duplex bilateral  DVT ultrasound showing no evidence of DVT of the  bilateral lower extremities, patient does have some left calf subcutaneous edema.  Given that she has erythema and tenderness to this side we will plan to treat for cellulitis with Keflex, she was encouraged to follow-up with her orthopedic surgeon however suspicion for complication related to surgery is low as surgical site is well appearing and has no surrounding erythema or fluctuance.  She has no joint swelling or tenderness suggestive of joint space infection.  She has no leukocytosis or signs of systemic illness we will plan to discharge home on oral antibiotic therapy. [KR]      ED Course User Index  [KR] Mary Colón DO         Diagnoses as of 07/30/25 1936   Cellulitis of left lower extremity     Disposition   As a result of the work-up, the patient was discharged home.  Sharifa was informed of Sharifa's diagnosis and instructed to come back with any concerns or worsening of condition.  Sharifa and was agreeable to the plan as discussed above.  Sharifa was given the opportunity to ask questions.  All of the patient's questions were answered.    Procedures   Procedures    Mary Colón DO  Emergency Medicine     Mary Colón DO  07/31/25 0028

## 2025-07-30 NOTE — PROGRESS NOTES
"Physical Therapy    Physical Therapy Treatment    Patient Name: Sharifa Moore  MRN: [id number]  Today's Date: 7/30/2025    Time Entry:   Time Calculation  Start Time: 0315  Stop Time: 0325  Time Calculation (min): 10 min     PT Therapeutic Procedures Time Entry  Gait Training Time Entry: 10            VISIT# 2       Assessment:    Pt arrives with stated ease of discomfort in Left hip. And c/o increased swelling in both LE's . With plans to call MD this date  Plan:  To continue POC as able. To attempt cane training next      Current Problem  Status post hip replacement, unspecified laterality - Primary Z96.649      Relevant Orders     Follow Up In Physical Therapy             Subjective    Pt arrives with c/o increased swelling in B/LE  L>R.       Precautions:   Posterior-lateral MIGUEL surgical precautions  Pain:   0/10  Prior Level of Function:   Independent with ADLs    Objective   Adjustment to walker and discussed ease of gait with walker and to bring in her cane next time for cane training.    Upon getting prepared for Ex. Pt experiences increased pain in Left lower extremity with assistance on to the table. Upon looking at her L/LE observed tightness in her skin , Redness from mid calf - distally , + Homans sign exhibited and sharp pains with [place] compression hose. PT ( Juan Quigley )was notified and agrees pt should contact MD and have her leg checked out .     No exercises performed this date.            ((   NOT  TODAY   ))-all  Treatments:  Glute sets 2 x 10 x 5\"  Quad sets 2 x 10 x 5\"  Hook lying hip adduction isometrics 2 x 10 x 5\"  Hook lying hip abduction green TB 2 x 10 x 5\"  Bridge 5 x 3\"  SAQ 2 x 10 x 3\"  Nustep x 5'     OP EDUCATION:       Goals:  Active       PT Problem       Patient will report compliance with her home exercise program.         Start:  07/28/25    Expected End:  10/26/25            Patient will report improvement via the LEFS to demonstrate improved activity " tolerance.         Start:  07/28/25    Expected End:  10/26/25            Patient will demonstrate improvements in left hip strength to 5/5 to facilitate weight bearing activity.         Start:  07/28/25    Expected End:  10/26/25            Patient will ambulate with a normalized gait pattern with least restrictive assistive device.         Start:  07/28/25    Expected End:  10/26/25

## 2025-07-30 NOTE — ED TRIAGE NOTES
Pt states that she had a total hip replacement on 7-14. Pt states that she noticed her lower left leg swelling. Pt states that her leg is painful to the touch and could not do PT today. Pt has swelling, redness and shine to lower left leg.

## 2025-07-30 NOTE — ED TRIAGE NOTES
Emergency Department Encounter  [facility] EMERGENCY MEDICINE    Patient: Sharifa Moore  MRN: [id number]  : 1945  Date of Evaluation: 2025  Triage Provider: Mary Colón DO      Chief Complaint       Chief Complaint   Patient presents with    Leg Swelling    Leg Pain     [place]    (Location/Symptom, Timing/Onset, Context/Setting, Quality, Duration, Modifying Factors, Severity) Note limiting factors.       Sharifa Moore is a 80 female s/p left total hip arthroplasty 2025 presenting to the ED for bilateral left greater than right lower extremity swelling and erythema.  Notes redness started in the left leg 2 days ago and has gotten mildly worse, PT recommended she come to the ED for evaluation of DVT.  She states her hip has been healing well and she has been increasing her strength and walking, no fever or infectious symptoms, no new injuries or wounds.  No shortness of breath or chest pain, no history of DVT, denies history of heart failure, takes daily aspirin.      Past History   Medical History[1]  Surgical History[2]  Social History[3]    Medications/Allergies     Previous Medications    ACETAMINOPHEN (TYLENOL EXTRA STRENGTH) 500 MG TABLET    Take 2 tablets (1,000 mg) by mouth every 6 hours if needed for mild pain (1 - 3).    ASCORBIC ACID (VITAMIN C) 500 MG TABLET    Take 1 tablet (500 mg) by mouth once daily.    ASPIRIN 81 MG EC TABLET    Take 1 tablet (81 mg) by mouth 2 times a day.    ATORVASTATIN (LIPITOR) 10 MG TABLET    Take 1 tablet (10 mg) by mouth once daily.    CHOLECALCIFEROL (VITAMIN D3) 25 MCG (1,000 UNITS) TABLET    Take 1 tablet (25 mcg) by mouth once daily.    CLINDAMYCIN (CLEOCIN) 300 MG CAPSULE    TAKE 2 CAPSULES BY MOUTH ONE HOUR PRIOR TO APPT    CYCLOBENZAPRINE (FLEXERIL) 10 MG TABLET    Take 1 tablet (10 mg) by mouth as needed at bedtime for muscle spasms.    DOCUSATE SODIUM (COLACE) 100 MG CAPSULE    Take 1 capsule (100 mg) by mouth 2 times a day.     FEXOFENADINE (ALLEGRA) 180 MG TABLET    Take 1 tablet (180 mg) by mouth once daily as needed (allergies).    MELOXICAM (MOBIC) 15 MG TABLET    Take 1 tablet (15 mg) by mouth once daily.    PANTOPRAZOLE (PROTONIX) 40 MG EC TABLET    Take 1 tablet (40 mg) by mouth once daily. Do not crush, chew, or split.    POLYETHYLENE GLYCOL (GLYCOLAX, MIRALAX) 17 GRAM/DOSE POWDER    Mix 17 g of powder and drink once daily. Mix 1 cap (17g) into 8 ounces of fluid.     Allergies[4]     Physical Exam       ED Triage Vitals [07/30/25 1620]   Temperature Heart Rate Respirations BP   36.2 °C (97.2 °F) 91 18 150/86      Pulse Ox Temp Source Heart Rate Source Patient Position   98 % Temporal Monitor --      BP Location FiO2 (%)     -- --         Physical Exam    Focused PE    GENERAL:  The patient appears nourished and normally developed. Vital signs as documented.     PULMONARY:  Lungs are clear to auscultation, without any respiratory distress. Able to speak full sentences, no accessory muscle use    CARDIAC:   Normal rate. No murmurs, rubs or gallops    ABDOMEN:  Soft, non distended, non tender. No rebound or guarding, no peritoneal signs, may be limited by patient positioning in triage    MUSCULOSKELETAL/skin:   Able to ambulate, left hip surgical incision site with overlying Steri-Strips clean, dry, no surrounding erythema fluctuance or drainage.  Bilateral lower extremity 2+ pitting edema to knee, left lower extremity with erythema laterally minimally warm, posterior calf tenderness bilaterally.  No fluctuance crepitus or drainage, no open wounds.    NEURO:  Alert and oriented, speech clear and coherent    Plan     80 female s/p left total hip arthroplasty 7/14 coming in with bilateral left greater than right lower extremity swelling, now with 1 day of left leg erythema consent by PT with concerns for DVT.  She has no open wounds or injuries, no fever or infectious symptoms, is afebrile nontoxic-appearing, she has painless range of  motion of the left hip and the hip surgical incision site on limited evaluation does not have any significant erythema induration or warmth.  She does however have some left lower leg erythema tenderness with consideration for DVT, also considered cellulitis, less likely joint infection.  Basic labs and DVT ultrasound ordered.      For the remainder of the patient's workup and ED course, please see the main ED provider note.  We discussed need for diagnostic testing including lab studies and imaging.  We also discussed that they may be asked to wait in the waiting room while these test are pending.  They understand that if they choose to leave without having the testing completed or resulted that we cannot rule out acute life-threatening illnesses and the risks involved to lead to worsening condition, permanent disability or even death.        Comment: Please note this report has been produced using speech recognition software and may contain errors related to that system including errors in grammar, punctuation, and spelling, as well as words and phrases that may be inappropriate.  If there are any questions or concerns please feel free to contact the dictating provider for clarification.    Mary Colón DO         [1]   Past Medical History:  Diagnosis Date    Acute diverticulitis     Allergic     Anemia 2024    Arthritis     Chronic vaginitis 2023    GI bleed 2018    History of arthroplasty of right shoulder 2024    Pain in left hip 2022    Left hip pain    Personal history of urinary (tract) infections 2014    Personal history of urinary tract infection    Scoliosis     Subacute and chronic vaginitis 2015    Chronic vaginitis    Tear of medial meniscus of knee 2023    Vertigo, benign positional 2023   [2]   Past Surgical History:  Procedure Laterality Date     SECTION, CLASSIC  2014     Section     SECTION, LOW TRANSVERSE       COLONOSCOPY  01/31/2014    Complete Colonoscopy    FOOT SURGERY Left     JOINT REPLACEMENT      TOTAL SHOULDER ARTHROPLASTY Right     WISDOM TOOTH EXTRACTION     [3]   Social History  Socioeconomic History    Marital status:    Tobacco Use    Smoking status: Never    Smokeless tobacco: Never   Vaping Use    Vaping status: Never Used   Substance and Sexual Activity    Alcohol use: Never    Drug use: Never    Sexual activity: Not Currently     Partners: Male     Birth control/protection: None     Comment:      Social Drivers of Health     Financial Resource Strain: Low Risk  (7/15/2025)    Overall Financial Resource Strain (CARDIA)     Difficulty of Paying Living Expenses: Not hard at all   Food Insecurity: No Food Insecurity (7/14/2025)    Hunger Vital Sign     Worried About Running Out of Food in the Last Year: Never true     Ran Out of Food in the Last Year: Never true   Transportation Needs: No Transportation Needs (7/23/2025)    OASIS : Transportation     Lack of Transportation (Medical): No     Lack of Transportation (Non-Medical): No     Patient Unable or Declines to Respond: No   Social Connections: Feeling Socially Integrated (7/23/2025)    OASIS : Social Isolation     Frequency of experiencing loneliness or isolation: Never   Intimate Partner Violence: Not At Risk (7/14/2025)    Humiliation, Afraid, Rape, and Kick questionnaire     Fear of Current or Ex-Partner: No     Emotionally Abused: No     Physically Abused: No     Sexually Abused: No   Housing Stability: Low Risk  (7/15/2025)    Housing Stability Vital Sign     Unable to Pay for Housing in the Last Year: No     Number of Times Moved in the Last Year: 0     Homeless in the Last Year: No   [4]   Allergies  Allergen Reactions    Penicillins Hives    Sulfamethoxazole Hives    Ampicillin Hives

## 2025-07-31 ENCOUNTER — OFFICE VISIT (OUTPATIENT)
Dept: ORTHOPEDIC SURGERY | Facility: CLINIC | Age: 80
End: 2025-07-31
Payer: MEDICARE

## 2025-07-31 DIAGNOSIS — Z96.642 S/P TOTAL LEFT HIP ARTHROPLASTY: Primary | ICD-10-CM

## 2025-07-31 PROCEDURE — 1159F MED LIST DOCD IN RCRD: CPT | Performed by: PHYSICIAN ASSISTANT

## 2025-07-31 PROCEDURE — 99024 POSTOP FOLLOW-UP VISIT: CPT | Performed by: PHYSICIAN ASSISTANT

## 2025-07-31 PROCEDURE — 99212 OFFICE O/P EST SF 10 MIN: CPT | Performed by: PHYSICIAN ASSISTANT

## 2025-07-31 PROCEDURE — 1036F TOBACCO NON-USER: CPT | Performed by: PHYSICIAN ASSISTANT

## 2025-07-31 ASSESSMENT — PAIN - FUNCTIONAL ASSESSMENT: PAIN_FUNCTIONAL_ASSESSMENT: NO/DENIES PAIN

## 2025-07-31 NOTE — PROGRESS NOTES
DAVID Laureano, PA-C, ATC  Adult Reconstruction and Joint Replacement Surgery  Phone: [phone]     Fax:[phone]          HPI:  Sharifa Moore is a pleasant 80 y.o. year-old female here for follow-up of their Left Total Hip Arthroplasty by Dr. Jaramillo.  The patient is approximately  week postop.  She was at physical therapy yesterday when her PTA noticed some erythema to the left lower extremity as well as swelling.  He immediately sent her to the emergency room.  She was seen in the ED a DVT rule out was planned the ultrasound was negative.  And was placed on an antibiotic.  Patient is here for follow-up for wound check.  She has not been wearing compression stockings.    Review of Systems  Medical History[1]  Problem List[2]  Medication Documentation Review Audit       Reviewed by Paola Moran LPN (Licensed Nurse) on 25 at 1312      Medication Order Taking? Sig Documenting Provider Last Dose Status   acetaminophen (Tylenol Extra Strength) 500 mg tablet [id number]  Take 2 tablets (1,000 mg) by mouth every 6 hours if needed for mild pain (1 - 3). Elias Mueller MD  Active   ascorbic acid (Vitamin C) 500 mg tablet [id number] No Take 1 tablet (500 mg) by mouth once daily. Historical Provider, MD Past Week Active   aspirin 81 mg EC tablet [id number]  Take 1 tablet (81 mg) by mouth 2 times a day. Elias Mueller MD  Active   atorvastatin (Lipitor) 10 mg tablet [id number] No Take 1 tablet (10 mg) by mouth once daily.   Patient taking differently: Take 1 tablet (10 mg) by mouth once daily at bedtime.    Nicole Quiles MD 2025  25 2026   cholecalciferol (Vitamin D3) 25 mcg (1,000 units) tablet [id number] No Take 1 tablet (25 mcg) by mouth once daily. Historical Provider, MD Past Week Active   clindamycin (Cleocin) 150 mg capsule [id number]  Take 3 capsules (450 mg) by mouth 3 times a day for 7 days. Mary Colón DO  Active   Discontinued 25 193   cyclobenzaprine  (Flexeril) 10 mg tablet [id number] No Take 1 tablet (10 mg) by mouth as needed at bedtime for muscle spasms. Nicole Quiles MD Past Month Active   docusate sodium (Colace) 100 mg capsule [id number]  Take 1 capsule (100 mg) by mouth 2 times a day. Elias Mueller MD  Active   fexofenadine (Allegra) 180 mg tablet [id number] No Take 1 tablet (180 mg) by mouth once daily as needed (allergies). Ana Tamez MD Past Month Active   meloxicam (Mobic) 15 mg tablet [id number]  Take 1 tablet (15 mg) by mouth once daily. Elias Mueller MD  Active   pantoprazole (ProtoNix) 40 mg EC tablet [id number]  Take 1 tablet (40 mg) by mouth once daily. Do not crush, chew, or split. Elias Mueller MD  Active   polyethylene glycol (Glycolax, Miralax) 17 gram/dose powder [id number]  Mix 17 g of powder and drink once daily. Mix 1 cap (17g) into 8 ounces of fluid. Elias Mueller MD  Active                  RX Allergies[3]  Social History     Socioeconomic History    Marital status:      Spouse name: Not on file    Number of children: Not on file    Years of education: Not on file    Highest education level: Not on file   Occupational History    Not on file   Tobacco Use    Smoking status: Never    Smokeless tobacco: Never   Vaping Use    Vaping status: Never Used   Substance and Sexual Activity    Alcohol use: Never    Drug use: Never    Sexual activity: Not Currently     Partners: Male     Birth control/protection: None     Comment:    Other Topics Concern    Not on file   Social History Narrative    Not on file     Social Drivers of Health     Financial Resource Strain: Low Risk  (7/15/2025)    Overall Financial Resource Strain (CARDIA)     Difficulty of Paying Living Expenses: Not hard at all   Food Insecurity: No Food Insecurity (7/14/2025)    Hunger Vital Sign     Worried About Running Out of Food in the Last Year: Never true     Ran Out of Food in the Last Year: Never true   Transportation Needs: No Transportation Needs  (7/23/2025)    OASIS : Transportation     Lack of Transportation (Medical): No     Lack of Transportation (Non-Medical): No     Patient Unable or Declines to Respond: No   Physical Activity: Not on file   Stress: Not on file   Social Connections: Feeling Socially Integrated (7/23/2025)    OASIS : Social Isolation     Frequency of experiencing loneliness or isolation: Never   Intimate Partner Violence: Not At Risk (7/14/2025)    Humiliation, Afraid, Rape, and Kick questionnaire     Fear of Current or Ex-Partner: No     Emotionally Abused: No     Physically Abused: No     Sexually Abused: No   Housing Stability: Low Risk  (7/15/2025)    Housing Stability Vital Sign     Unable to Pay for Housing in the Last Year: No     Number of Times Moved in the Last Year: 0     Homeless in the Last Year: No     Surgical History[4]    Physical Exam  side: left hip  There were no vitals filed for this visit.  AxO x 3 in NAD.   Assistive Device: no device. Coordination and balance intact.  Normal bilateral upper and lower extremities.  No erythema, ecchymosis, temperature changes. No popliteal lymphadenopathy,  No overlying lesion  Mood: euthymic  Respirations non-labored    Neurovascular exam is at baseline.  Range of motion slowly returning  5/5 hip flexion/knee extension/DF/PF/EHL  SILT in sumaya/saph/ per/tib distribution   Extremities warm and well perfused.  No lower extremity calf tenderness, warmth or swelling. Lower extremity well perfused  2+ Femoral/DP/PT pulses bilaterally  Incision is midline no erythema, drainage or discharge.  She does have a little bit of erythema to the left lower extremity secondary to swelling.  She has not been wearing compression stockings.  Impression/Plan  Sharifa Moore and I discussed the etiology of symptoms.  We discussed in detail a treatment plan that he/she is comfortable with.    1. S/P Left Total Hip Arthroplasty -I discussed with the patient today in clinic that her incision  looks great.  She has no evidence of infection along the incision site.  She does have a little bit of erythema to the left lower extremity.  The ED placed her on an antibiotic to cover against any potential infection.  I encouraged the use of compression stockings.  We will see her back in a week for a follow-up.    Follow-up  1 week    DAVID Laureano, PA-C, ATC  Orthopedic Physician Assisant  Adult Reconstruction and Total Joint Replacement  General Orthopedics  Department of Orthopaedic Surgery  [place] [address]  [employer] messaging preferred           [1]   Past Medical History:  Diagnosis Date    Acute diverticulitis     Allergic     Anemia 05/20/2024    Arthritis     Chronic vaginitis 09/20/2023    GI bleed 07/20/2018    History of arthroplasty of right shoulder 05/20/2024    Pain in left hip 04/22/2022    Left hip pain    Personal history of urinary (tract) infections 05/09/2014    Personal history of urinary tract infection    Scoliosis     Subacute and chronic vaginitis 01/05/2015    Chronic vaginitis    Tear of medial meniscus of knee 09/20/2023    Vertigo, benign positional 09/20/2023   [2]   Patient Active Problem List  Diagnosis    Achilles tendinitis of right lower extremity    Arthritis    Dermatitis    Eczema    Shoulder arthritis    Chronic eczematous otitis externa of right ear    GERD without esophagitis    High magnesium levels    Insomnia    Mixed hyperlipidemia    Osteoporosis    Tear of medial meniscus of right knee    Age-related nuclear cataract of both eyes    Asymmetrical sensorineural hearing loss    Arthritis of knee    Generalized osteoarthrosis of hand    Hypertension    Primary osteoarthritis of ankle    Elevated fasting glucose    Right forearm cellulitis    Primary osteoarthritis of left hip    Osteoarthritis of left hip    Status post hip replacement, unspecified laterality   [3]   Allergies  Allergen  Reactions    Penicillins Hives    Sulfamethoxazole Hives    Ampicillin Hives   [4]   Past Surgical History:  Procedure Laterality Date     SECTION, CLASSIC  2014     Section     SECTION, LOW TRANSVERSE      COLONOSCOPY  2014    Complete Colonoscopy    FOOT SURGERY Left     JOINT REPLACEMENT      TOTAL SHOULDER ARTHROPLASTY Right     WISDOM TOOTH EXTRACTION

## 2025-08-04 ENCOUNTER — TREATMENT (OUTPATIENT)
Dept: PHYSICAL THERAPY | Facility: CLINIC | Age: 80
End: 2025-08-04
Payer: MEDICARE

## 2025-08-04 DIAGNOSIS — Z96.649 STATUS POST HIP REPLACEMENT, UNSPECIFIED LATERALITY: ICD-10-CM

## 2025-08-04 PROCEDURE — 97110 THERAPEUTIC EXERCISES: CPT | Mod: GP,CQ

## 2025-08-04 NOTE — PROGRESS NOTES
"Physical Therapy    Physical Therapy Treatment    Patient Name: Sharifa Moore  MRN: [id number]  Today's Date: 8/4/2025    Time Entry:   Time Calculation  Start Time: 0311  Stop Time: 0352  Time Calculation (min): 41 min     PT Therapeutic Procedures Time Entry  Therapeutic Exercise Time Entry: 41            VISIT# 3     Assessment:    Pt tolerating routine well with less discomfort this date.   Plan:  To continue POC as able. To advance closed chain TE's as tolerated      Current Problem  Status post hip replacement, unspecified laterality - Primary Z96.649      Relevant Orders     Follow Up In Physical Therapy             Subjective    Pt arrives ready for care.       Precautions:   Posterior-lateral MIGUEL surgical precautions  Pain:   0/10  Prior Level of Function:   Independent with ADLs    Objective   Performs routine as logged with frequent breaks d/t fatigue. Exhibits good AAROM of left LE/hip.    Treatments:  Heel slide + Abduction slide 20 x  Glute sets 2 x 10 x 5\"  Quad sets 2 x 10 x 5\"  Hook lying hip adduction isometrics 2 x 10 x 5\"  Hook lying hip abduction green TB 2 x 10 x 5\"  Bridge 10 x 2    3\"  SAQ 2 x 10 x 2    3\"  Nustep x 5'     OP EDUCATION:       Goals:  Active       PT Problem       Patient will report compliance with her home exercise program.         Start:  07/28/25    Expected End:  10/26/25            Patient will report improvement via the LEFS to demonstrate improved activity tolerance.         Start:  07/28/25    Expected End:  10/26/25            Patient will demonstrate improvements in left hip strength to 5/5 to facilitate weight bearing activity.         Start:  07/28/25    Expected End:  10/26/25            Patient will ambulate with a normalized gait pattern with least restrictive assistive device.         Start:  07/28/25    Expected End:  10/26/25              "

## 2025-08-06 ENCOUNTER — TREATMENT (OUTPATIENT)
Dept: PHYSICAL THERAPY | Facility: CLINIC | Age: 80
End: 2025-08-06
Payer: MEDICARE

## 2025-08-06 DIAGNOSIS — Z96.649 STATUS POST HIP REPLACEMENT, UNSPECIFIED LATERALITY: ICD-10-CM

## 2025-08-06 PROCEDURE — 97110 THERAPEUTIC EXERCISES: CPT | Mod: GP,CQ

## 2025-08-06 PROCEDURE — 97530 THERAPEUTIC ACTIVITIES: CPT | Mod: GP,CQ

## 2025-08-06 NOTE — PROGRESS NOTES
"Physical Therapy    Physical Therapy Treatment    Patient Name: Sharifa Moore  MRN: [id number]  Today's Date: 8/6/2025    Time Entry:   Time Calculation  Start Time: 0317  Stop Time: 0402  Time Calculation (min): 45 min     PT Therapeutic Procedures Time Entry  Therapeutic Exercise Time Entry: 30  Therapeutic Activity Time Entry: 15            VISIT# 4    Assessment:    Pt pleased with progression of abilities in her ADL's   Plan:  To continue POC as able. To advance closed chain TE's as tolerated      Current Problem  Status post hip replacement, unspecified laterality - Primary Z96.649      Relevant Orders     Follow Up In Physical Therapy             Subjective    Pt arrives ready for care.       Precautions:   Posterior-lateral MIGUEL surgical precautions  Pain:   0/10  Prior Level of Function:   Independent with ADLs    Objective   Pt pleased with progress to date. Adding closed chain TE's this date. Working on stair climbing strategies. Tolerating all well with lite fatigue with breaks.  Treatments:  Therapeutic Ex.  Heel slide + Abduction slide 20 x 2  ea.  Glute sets 2 x 10 x 5\"  Quad sets 2 x 10 x 5\"  Hook lying hip adduction isometrics 2 x 10 x 5\"  Hook lying hip abduction green TB 2 x 10 x 5\"  Bridge 10 x 2    3\"  SAQ 2 x 10 x 2    3\"  Nustep x 5'    Therapeutic Act:   Step up 4 inch  10 x 2  stair strategy instruction   Lat step up 4 inch 10 x 2 stair strategy instruction   STS  chair + 1 airex  10 x   Lateral stepping  2 laps       OP EDUCATION:       Goals:  Active       PT Problem       Patient will report compliance with her home exercise program.         Start:  07/28/25    Expected End:  10/26/25            Patient will report improvement via the LEFS to demonstrate improved activity tolerance.         Start:  07/28/25    Expected End:  10/26/25            Patient will demonstrate improvements in left hip strength to 5/5 to facilitate weight bearing activity.         Start:  07/28/25    Expected " End:  10/26/25            Patient will ambulate with a normalized gait pattern with least restrictive assistive device.         Start:  07/28/25    Expected End:  10/26/25            Physical Therapy Treatment    Patient Name: Sharifa Moore  MRN: [id number]  YOB: 1945  Encounter Date: 8/6/2025    Time Entry:  Time Calculation  Start Time: 0317  Stop Time: 0402  Time Calculation (min): 45 min     PT Therapeutic Procedures Time Entry  Therapeutic Exercise Time Entry: 30  Therapeutic Activity Time Entry: 15                   Rehab Insurance Information:                    Rehab Falls Risk Assessment:         Problem List Items Addressed This Visit           ICD-10-CM       Musculoskeletal and Injuries    Status post hip replacement, unspecified laterality Z96.649            Subjective             Patient-Reported Outcomes  Hoos Jr-Hip Disability And Osteoarthritis Outcome Score For Joint Replacement    5/29/2025  2:56 PM EDT - Filed by Patient   Hoos Jr Scoring (range: 0 - 100) 46.65            Objective               Activities                                                                Assessment/Plan   Assessment:          Plan:

## 2025-08-07 ENCOUNTER — OFFICE VISIT (OUTPATIENT)
Dept: ORTHOPEDIC SURGERY | Facility: CLINIC | Age: 80
End: 2025-08-07
Payer: MEDICARE

## 2025-08-07 DIAGNOSIS — Z96.642 S/P TOTAL LEFT HIP ARTHROPLASTY: Primary | ICD-10-CM

## 2025-08-07 PROCEDURE — 99212 OFFICE O/P EST SF 10 MIN: CPT | Performed by: PHYSICIAN ASSISTANT

## 2025-08-07 PROCEDURE — 1159F MED LIST DOCD IN RCRD: CPT | Performed by: PHYSICIAN ASSISTANT

## 2025-08-07 PROCEDURE — 99024 POSTOP FOLLOW-UP VISIT: CPT | Performed by: PHYSICIAN ASSISTANT

## 2025-08-07 PROCEDURE — 1036F TOBACCO NON-USER: CPT | Performed by: PHYSICIAN ASSISTANT

## 2025-08-07 ASSESSMENT — PAIN - FUNCTIONAL ASSESSMENT: PAIN_FUNCTIONAL_ASSESSMENT: NO/DENIES PAIN

## 2025-08-07 NOTE — PROGRESS NOTES
DAVID Laureano, PARaminC, ATC  Adult Reconstruction and Joint Replacement Surgery  Phone: [phone]     Fax:[phone]          HPI:  Sharifa Moore is a pleasant 80 y.o. year-old female here for follow-up of their Left Total Hip Arthroplasty by Dr. Jaramillo.  The patient is approximately 3week postop. The patient noticed some erythema in her left lower extremity.  She went to the emergency room and had a Doppler ultrasound done and was placed on clindamycin.  She states that she does have some skin tenderness but no deep tenderness to palpation suggestive of a DVT.  Her incision looks great.  There is no erythema.    Review of Systems  Medical History[1]  Problem List[2]  Medication Documentation Review Audit       Reviewed by Paola Moran LPN (Licensed Nurse) on 25 at 1056      Medication Order Taking? Sig Documenting Provider Last Dose Status   acetaminophen (Tylenol Extra Strength) 500 mg tablet [id number]  Take 2 tablets (1,000 mg) by mouth every 6 hours if needed for mild pain (1 - 3). Elias Mueller MD  Active   ascorbic acid (Vitamin C) 500 mg tablet [id number] No Take 1 tablet (500 mg) by mouth once daily. Historical Provider, MD Past Week Active   aspirin 81 mg EC tablet [id number]  Take 1 tablet (81 mg) by mouth 2 times a day. Elias Mueller MD  Active   atorvastatin (Lipitor) 10 mg tablet [id number] No Take 1 tablet (10 mg) by mouth once daily.   Patient taking differently: Take 1 tablet (10 mg) by mouth once daily at bedtime.    Nicole Quiles MD 2025  25 235   cholecalciferol (Vitamin D3) 25 mcg (1,000 units) tablet [id number] No Take 1 tablet (25 mcg) by mouth once daily. Historical Provider, MD Past Week Active   clindamycin (Cleocin) 150 mg capsule [id number]  Take 3 capsules (450 mg) by mouth 3 times a day for 7 days. Mary Colón DO   25 2359   cyclobenzaprine (Flexeril) 10 mg tablet [id number] No Take 1 tablet (10 mg) by mouth as needed at  bedtime for muscle spasms. Nicole Quiles MD Past Month Active   docusate sodium (Colace) 100 mg capsule [id number]  Take 1 capsule (100 mg) by mouth 2 times a day. Elias Mueller MD  Active   fexofenadine (Allegra) 180 mg tablet [id number] No Take 1 tablet (180 mg) by mouth once daily as needed (allergies). Ana Tamez MD Past Month Active   meloxicam (Mobic) 15 mg tablet [id number]  Take 1 tablet (15 mg) by mouth once daily. Elias Mueller MD  Active   pantoprazole (ProtoNix) 40 mg EC tablet [id number]  Take 1 tablet (40 mg) by mouth once daily. Do not crush, chew, or split. Elias Mueller MD  Active   polyethylene glycol (Glycolax, Miralax) 17 gram/dose powder [id number]  Mix 17 g of powder and drink once daily. Mix 1 cap (17g) into 8 ounces of fluid. Elias Mueller MD  Active                  RX Allergies[3]  Social History     Socioeconomic History    Marital status:      Spouse name: Not on file    Number of children: Not on file    Years of education: Not on file    Highest education level: Not on file   Occupational History    Not on file   Tobacco Use    Smoking status: Never    Smokeless tobacco: Never   Vaping Use    Vaping status: Never Used   Substance and Sexual Activity    Alcohol use: Never    Drug use: Never    Sexual activity: Not Currently     Partners: Male     Birth control/protection: None     Comment:    Other Topics Concern    Not on file   Social History Narrative    Not on file     Social Drivers of Health     Financial Resource Strain: Low Risk  (7/15/2025)    Overall Financial Resource Strain (CARDIA)     Difficulty of Paying Living Expenses: Not hard at all   Food Insecurity: No Food Insecurity (7/14/2025)    Hunger Vital Sign     Worried About Running Out of Food in the Last Year: Never true     Ran Out of Food in the Last Year: Never true   Transportation Needs: No Transportation Needs (7/23/2025)    OASIS : Transportation     Lack of Transportation (Medical): No      Lack of Transportation (Non-Medical): No     Patient Unable or Declines to Respond: No   Physical Activity: Not on file   Stress: Not on file   Social Connections: Feeling Socially Integrated (7/23/2025)    OASIS : Social Isolation     Frequency of experiencing loneliness or isolation: Never   Intimate Partner Violence: Not At Risk (7/14/2025)    Humiliation, Afraid, Rape, and Kick questionnaire     Fear of Current or Ex-Partner: No     Emotionally Abused: No     Physically Abused: No     Sexually Abused: No   Housing Stability: Low Risk  (7/15/2025)    Housing Stability Vital Sign     Unable to Pay for Housing in the Last Year: No     Number of Times Moved in the Last Year: 0     Homeless in the Last Year: No     Surgical History[4]    Physical Exam  side: left hip  There were no vitals filed for this visit.  AxO x 3 in NAD.   Assistive Device: cane. Coordination and balance intact.  Normal bilateral upper and lower extremities.  No erythema, ecchymosis, temperature changes. No popliteal lymphadenopathy,  No overlying lesion  Mood: euthymic  Respirations non-labored    Neurovascular exam is at baseline.  Range of motion slowly returning  5/5 hip flexion/knee extension/DF/PF/EHL  SILT in sumaya/saph/ per/tib distribution   Extremities warm and well perfused.  No lower extremity calf tenderness, warmth or swelling. Lower extremity well perfused  2+ Femoral/DP/PT pulses bilaterally    The incision is midline  with well-healing.  No tenderness to palpation, no erythema.  There is slight erythema to the left lower extremity with some swelling.  No tenderness to deep palpation    Impression/Plan  Sharifa Moore and I discussed the etiology of symptoms.  We discussed in detail a treatment plan that he/she is comfortable with.    1. S/P Left Total Hip Arthroplasty -I discussed with the patient today that I do not think she has an infection.  She has some tenderness at the skin surface of the left lower extremity.   This could be related to the surgical scrub.  She has no deep tenderness to palpation suggestive of a DVT and her ultrasound Doppler study was negative.  We will see her back in her next appointment for a full postop visit.    Follow-up  next post op visit    DAVID Laureano, PA-C, ATC  Orthopedic Physician Assisant  Adult Reconstruction and Total Joint Replacement  General Orthopedics  Department of Orthopaedic Surgery  [place] [address]  [employer] messaging preferred         [1]   Past Medical History:  Diagnosis Date    Acute diverticulitis     Allergic     Anemia 05/20/2024    Arthritis     Chronic vaginitis 09/20/2023    GI bleed 07/20/2018    History of arthroplasty of right shoulder 05/20/2024    Pain in left hip 04/22/2022    Left hip pain    Personal history of urinary (tract) infections 05/09/2014    Personal history of urinary tract infection    Scoliosis     Subacute and chronic vaginitis 01/05/2015    Chronic vaginitis    Tear of medial meniscus of knee 09/20/2023    Vertigo, benign positional 09/20/2023   [2]   Patient Active Problem List  Diagnosis    Achilles tendinitis of right lower extremity    Arthritis    Dermatitis    Eczema    Shoulder arthritis    Chronic eczematous otitis externa of right ear    GERD without esophagitis    High magnesium levels    Insomnia    Mixed hyperlipidemia    Osteoporosis    Tear of medial meniscus of right knee    Age-related nuclear cataract of both eyes    Asymmetrical sensorineural hearing loss    Arthritis of knee    Generalized osteoarthrosis of hand    Hypertension    Primary osteoarthritis of ankle    Elevated fasting glucose    Right forearm cellulitis    Primary osteoarthritis of left hip    Osteoarthritis of left hip    Status post hip replacement, unspecified laterality   [3]   Allergies  Allergen Reactions    Penicillins Hives    Sulfamethoxazole Hives    Ampicillin Hives   [4]    Past Surgical History:  Procedure Laterality Date     SECTION, CLASSIC  2014     Section     SECTION, LOW TRANSVERSE      COLONOSCOPY  2014    Complete Colonoscopy    FOOT SURGERY Left     JOINT REPLACEMENT      TOTAL SHOULDER ARTHROPLASTY Right     WISDOM TOOTH EXTRACTION

## 2025-08-11 ENCOUNTER — APPOINTMENT (OUTPATIENT)
Dept: PHYSICAL THERAPY | Facility: CLINIC | Age: 80
End: 2025-08-11
Payer: MEDICARE

## 2025-08-13 ENCOUNTER — TREATMENT (OUTPATIENT)
Dept: PHYSICAL THERAPY | Facility: CLINIC | Age: 80
End: 2025-08-13
Payer: MEDICARE

## 2025-08-13 DIAGNOSIS — Z96.649 STATUS POST HIP REPLACEMENT, UNSPECIFIED LATERALITY: ICD-10-CM

## 2025-08-13 PROCEDURE — 97110 THERAPEUTIC EXERCISES: CPT | Mod: GP,CQ

## 2025-08-14 ENCOUNTER — SPECIALTY PHARMACY (OUTPATIENT)
Dept: PHARMACY | Facility: CLINIC | Age: 80
End: 2025-08-14

## 2025-08-18 ENCOUNTER — TREATMENT (OUTPATIENT)
Dept: PHYSICAL THERAPY | Facility: CLINIC | Age: 80
End: 2025-08-18
Payer: MEDICARE

## 2025-08-18 DIAGNOSIS — Z96.649 STATUS POST HIP REPLACEMENT, UNSPECIFIED LATERALITY: ICD-10-CM

## 2025-08-18 PROCEDURE — 97110 THERAPEUTIC EXERCISES: CPT | Mod: GP

## 2025-08-20 ENCOUNTER — TREATMENT (OUTPATIENT)
Dept: PHYSICAL THERAPY | Facility: CLINIC | Age: 80
End: 2025-08-20
Payer: MEDICARE

## 2025-08-20 DIAGNOSIS — Z96.649 STATUS POST HIP REPLACEMENT, UNSPECIFIED LATERALITY: ICD-10-CM

## 2025-08-20 PROCEDURE — 97110 THERAPEUTIC EXERCISES: CPT | Mod: GP,CQ

## 2025-08-20 PROCEDURE — 97530 THERAPEUTIC ACTIVITIES: CPT | Mod: GP,CQ

## 2025-08-21 ENCOUNTER — APPOINTMENT (OUTPATIENT)
Dept: ORTHOPEDIC SURGERY | Facility: CLINIC | Age: 80
End: 2025-08-21
Payer: MEDICARE

## 2025-08-25 ENCOUNTER — APPOINTMENT (OUTPATIENT)
Dept: PHYSICAL THERAPY | Facility: CLINIC | Age: 80
End: 2025-08-25
Payer: MEDICARE

## 2025-08-27 ENCOUNTER — TREATMENT (OUTPATIENT)
Dept: PHYSICAL THERAPY | Facility: CLINIC | Age: 80
End: 2025-08-27
Payer: MEDICARE

## 2025-08-27 DIAGNOSIS — Z96.649 STATUS POST HIP REPLACEMENT, UNSPECIFIED LATERALITY: ICD-10-CM

## 2025-08-27 PROCEDURE — 97110 THERAPEUTIC EXERCISES: CPT | Mod: GP

## 2025-08-28 ENCOUNTER — OFFICE VISIT (OUTPATIENT)
Dept: ORTHOPEDIC SURGERY | Facility: CLINIC | Age: 80
End: 2025-08-28
Payer: MEDICARE

## 2025-08-28 ENCOUNTER — HOSPITAL ENCOUNTER (OUTPATIENT)
Dept: RADIOLOGY | Facility: CLINIC | Age: 80
Discharge: HOME | End: 2025-08-28
Payer: MEDICARE

## 2025-08-28 DIAGNOSIS — Z96.642 S/P TOTAL LEFT HIP ARTHROPLASTY: Primary | ICD-10-CM

## 2025-08-28 DIAGNOSIS — Z96.642 S/P TOTAL LEFT HIP ARTHROPLASTY: ICD-10-CM

## 2025-08-28 PROCEDURE — 99024 POSTOP FOLLOW-UP VISIT: CPT | Performed by: PHYSICIAN ASSISTANT

## 2025-08-28 PROCEDURE — 1036F TOBACCO NON-USER: CPT | Performed by: PHYSICIAN ASSISTANT

## 2025-08-28 PROCEDURE — 1159F MED LIST DOCD IN RCRD: CPT | Performed by: PHYSICIAN ASSISTANT

## 2025-08-28 PROCEDURE — 99212 OFFICE O/P EST SF 10 MIN: CPT | Performed by: PHYSICIAN ASSISTANT

## 2025-08-28 PROCEDURE — 73502 X-RAY EXAM HIP UNI 2-3 VIEWS: CPT | Mod: LT

## 2025-08-28 ASSESSMENT — PAIN - FUNCTIONAL ASSESSMENT: PAIN_FUNCTIONAL_ASSESSMENT: NO/DENIES PAIN

## 2025-09-03 ENCOUNTER — TREATMENT (OUTPATIENT)
Dept: PHYSICAL THERAPY | Facility: CLINIC | Age: 80
End: 2025-09-03
Payer: MEDICARE

## 2025-09-03 DIAGNOSIS — Z96.649 STATUS POST HIP REPLACEMENT, UNSPECIFIED LATERALITY: ICD-10-CM

## 2025-09-03 PROCEDURE — 97110 THERAPEUTIC EXERCISES: CPT | Mod: GP,CQ

## 2025-10-17 ENCOUNTER — APPOINTMENT (OUTPATIENT)
Dept: ORTHOPEDIC SURGERY | Facility: CLINIC | Age: 80
End: 2025-10-17
Payer: MEDICARE

## 2026-03-02 ENCOUNTER — APPOINTMENT (OUTPATIENT)
Dept: PRIMARY CARE | Facility: CLINIC | Age: 81
End: 2026-03-02
Payer: MEDICARE

## (undated) DEVICE — SYRINGE, 60 CC, IRRIGATION, BULB, CONTRO-BULB, PAPER POUCH

## (undated) DEVICE — SUTURE, ETHIBOND XTRA, 5 V-37, GRN/BR, LF

## (undated) DEVICE — DRESSING, MEPILEX BORDER, POST-OP AG, 4 X 10 IN

## (undated) DEVICE — HOOD, SURGICAL, FLYTE HYBRID

## (undated) DEVICE — MEDLINE SUTURE RETRIEVER, STERILE

## (undated) DEVICE — SUTURE, VICRYL, 0, 18 IN, CT-1, UNDYED

## (undated) DEVICE — NEEDLE, SPINAL, QUINCKE, 18 G X 3.5 IN, PINK HUB

## (undated) DEVICE — SUTURE, VICRYL, 2-0, 18 IN CP-2, UNDYED

## (undated) DEVICE — DRAPE, IRRIGATION, W/POUCH, ADHESIVE STRIP, STERI DRAPE, 19 X 23 IN, DISPOSABLE, STERILE

## (undated) DEVICE — DRAPE, ISOLATION, INCISE, W/POUCH, STERI DRAPE, 125 X 83 IN, DISPOSABLE, STERILE

## (undated) DEVICE — DRAPE, INCISE, STERI DRAPE, LARGE, 23 X 17 IN, DISPOSABLE, STERILE

## (undated) DEVICE — Device

## (undated) DEVICE — RETRIEVER, SUTURE, STERILE

## (undated) DEVICE — GLOVE, SURGICAL, PROTEXIS PI , 7.5, PF, LF

## (undated) DEVICE — GLOVE, SURGICAL, PROTEXIS PI ORTHO, 8.0, PF, LF

## (undated) DEVICE — GLOVE, SURGICAL, PROTEXIS PI , 6.5, PF, LF

## (undated) DEVICE — GLOVE, SURGICAL, PROTEXIS PI BLUE W/NEUTHERA, 7.0, PF, LF

## (undated) DEVICE — SYRINGE, 50 CC, LUER LOCK

## (undated) DEVICE — STRIP, SKIN CLOSURE, STERI STRIP, REINFORCED, 0.5 X 4 IN

## (undated) DEVICE — BLADE, SAW, SAGITTAL, 18.5 X 73 X 0.76 MM, STAINLESS STEEL, STERILE

## (undated) DEVICE — ADHESIVE, SKIN, DERMABOND ADVANCED, 15CM, PEN-STYLE